# Patient Record
Sex: FEMALE | Race: OTHER | HISPANIC OR LATINO | Employment: FULL TIME | URBAN - METROPOLITAN AREA
[De-identification: names, ages, dates, MRNs, and addresses within clinical notes are randomized per-mention and may not be internally consistent; named-entity substitution may affect disease eponyms.]

---

## 2017-02-22 ENCOUNTER — OFFICE VISIT (OUTPATIENT)
Dept: URGENT CARE | Facility: PHYSICIAN GROUP | Age: 32
End: 2017-02-22
Payer: COMMERCIAL

## 2017-02-22 VITALS
DIASTOLIC BLOOD PRESSURE: 68 MMHG | HEART RATE: 110 BPM | BODY MASS INDEX: 34.55 KG/M2 | OXYGEN SATURATION: 96 % | SYSTOLIC BLOOD PRESSURE: 110 MMHG | TEMPERATURE: 99.1 F | HEIGHT: 66 IN | WEIGHT: 215 LBS

## 2017-02-22 DIAGNOSIS — J06.9 ACUTE URI: ICD-10-CM

## 2017-02-22 PROCEDURE — 99204 OFFICE O/P NEW MOD 45 MIN: CPT | Performed by: NURSE PRACTITIONER

## 2017-02-22 RX ORDER — AMOXICILLIN AND CLAVULANATE POTASSIUM 875; 125 MG/1; MG/1
1 TABLET, FILM COATED ORAL 2 TIMES DAILY
Qty: 20 TAB | Refills: 0 | Status: ON HOLD | OUTPATIENT
Start: 2017-02-22 | End: 2019-07-07

## 2017-02-22 RX ORDER — CODEINE PHOSPHATE AND GUAIFENESIN 10; 100 MG/5ML; MG/5ML
5-10 SOLUTION ORAL EVERY 6 HOURS PRN
Qty: 90 ML | Refills: 0 | Status: ON HOLD | OUTPATIENT
Start: 2017-02-22 | End: 2019-07-07

## 2017-02-22 ASSESSMENT — ENCOUNTER SYMPTOMS
SHORTNESS OF BREATH: 0
NAUSEA: 0
DIARRHEA: 0
FEVER: 0
SINUS PRESSURE: 1
CHILLS: 0
SORE THROAT: 1
SPUTUM PRODUCTION: 1
WEAKNESS: 1
VOMITING: 0
COUGH: 1
MYALGIAS: 0

## 2017-02-23 NOTE — PATIENT INSTRUCTIONS

## 2017-02-23 NOTE — PROGRESS NOTES
"Subjective:      Audrey Leyva is a 31 y.o. female who presents with Nasal Congestion            HPI Comments: Medications, Allergies and Prior Medical Hx reviewed and updated in [facility].with patient/family today         Sinusitis  This is a new problem. The current episode started in the past 7 days (3 days). The problem has been gradually worsening since onset. There has been no fever. Her pain is at a severity of 7/10. Associated symptoms include congestion, coughing, ear pain, sinus pressure and a sore throat. Pertinent negatives include no chills or shortness of breath. Past treatments include oral decongestants. The treatment provided mild relief.       Review of Systems   Constitutional: Positive for malaise/fatigue. Negative for fever and chills.   HENT: Positive for congestion, ear pain, sinus pressure and sore throat. Negative for ear discharge.    Respiratory: Positive for cough and sputum production. Negative for shortness of breath.    Gastrointestinal: Negative for nausea, vomiting and diarrhea.   Musculoskeletal: Negative for myalgias.   Neurological: Positive for weakness.          Objective:     /68 mmHg  Pulse 110  Temp(Src) 37.3 °C (99.1 °F)  Ht 1.676 m (5' 6\")  Wt 97.523 kg (215 lb)  BMI 34.72 kg/m2  SpO2 96%     Physical Exam   Constitutional: She appears well-developed and well-nourished. No distress.   HENT:   Head: Normocephalic and atraumatic.   Right Ear: Tympanic membrane and ear canal normal.   Left Ear: Tympanic membrane and ear canal normal.   Nose: Rhinorrhea present. Right sinus exhibits no maxillary sinus tenderness and no frontal sinus tenderness. Left sinus exhibits maxillary sinus tenderness. Left sinus exhibits no frontal sinus tenderness.   Mouth/Throat: Uvula is midline and mucous membranes are normal. Posterior oropharyngeal edema and posterior oropharyngeal erythema present. No oropharyngeal exudate.   Eyes: Conjunctivae are normal. Pupils are equal, round, and " reactive to light.   Neck: Neck supple.   Cardiovascular: Normal rate, regular rhythm and normal heart sounds.    Pulmonary/Chest: Effort normal and breath sounds normal. No respiratory distress. She has no wheezes.   Lymphadenopathy:     She has cervical adenopathy.   Neurological: She is alert.   Skin: Skin is warm and dry.   Psychiatric: She has a normal mood and affect. Her behavior is normal.   Vitals reviewed.              Assessment/Plan:     1. Acute URI  amoxicillin-clavulanate (AUGMENTIN) 875-125 MG Tab    guaifenesin-codeine (CHERATUSSIN AC) Solution oral solution         rx written patient will hold until parameters met as dicussed with patient    Do not drink alcohol or operate machinery with this medication  Pt reviewed on [place]  Aware,  no remarkable controlled substance prescription documentation noted      Modified Epic generated written imformation provided along with verbal instructions    Rest, Fluids, tylenol, ibuprofen, sinus irrigation,  humidified air, and otc decongestants  Pt will go to the ER for worsening or changing symptoms as discussed,   Follow-up with your primary care provider or return here if not improving in 5 - 7 days   Discharge instructions discussed with pt/family who verbalize understanding and agreement with poc

## 2018-03-29 ENCOUNTER — OFFICE VISIT (OUTPATIENT)
Dept: URGENT CARE | Facility: CLINIC | Age: 33
End: 2018-03-29
Payer: COMMERCIAL

## 2018-03-29 VITALS
RESPIRATION RATE: 18 BRPM | WEIGHT: 228.2 LBS | OXYGEN SATURATION: 97 % | SYSTOLIC BLOOD PRESSURE: 114 MMHG | DIASTOLIC BLOOD PRESSURE: 70 MMHG | BODY MASS INDEX: 36.67 KG/M2 | HEART RATE: 84 BPM | TEMPERATURE: 98.3 F | HEIGHT: 66 IN

## 2018-03-29 DIAGNOSIS — J02.9 SORE THROAT: ICD-10-CM

## 2018-03-29 DIAGNOSIS — J03.90 TONSILLITIS: ICD-10-CM

## 2018-03-29 PROCEDURE — 99214 OFFICE O/P EST MOD 30 MIN: CPT | Performed by: NURSE PRACTITIONER

## 2018-03-29 RX ORDER — DICLOFENAC POTASSIUM 50 MG/1
TABLET, FILM COATED ORAL
Status: ON HOLD | COMMUNITY
Start: 2018-03-08 | End: 2019-07-07

## 2018-03-29 RX ORDER — AMOXICILLIN 875 MG/1
875 TABLET, COATED ORAL 2 TIMES DAILY
Qty: 20 TAB | Refills: 0 | Status: SHIPPED | OUTPATIENT
Start: 2018-03-29 | End: 2018-04-08

## 2018-03-29 RX ORDER — ERGOCALCIFEROL 1.25 MG/1
CAPSULE ORAL
Status: ON HOLD | COMMUNITY
Start: 2018-03-13 | End: 2019-07-07

## 2018-03-29 ASSESSMENT — ENCOUNTER SYMPTOMS
SWOLLEN GLANDS: 1
TROUBLE SWALLOWING: 1
EYES NEGATIVE: 1
FEVER: 0
CARDIOVASCULAR NEGATIVE: 1
SORE THROAT: 1
CHILLS: 0
MUSCULOSKELETAL NEGATIVE: 1
COUGH: 1

## 2018-03-29 NOTE — PROGRESS NOTES
"Subjective:      Audrey Leyva is a 33 y.o. female who presents with Pharyngitis (ear ache, swollen tonsils, x 9 days )    History reviewed. No pertinent past medical history.  Social History     Social History   • Marital status: Single     Spouse name: N/A   • Number of children: N/A   • Years of education: N/A     Occupational History   • Not on file.     Social History Main Topics   • Smoking status: Current Some Day Smoker   • Smokeless tobacco: Never Used   • Alcohol use Not on file   • Drug use: Unknown   • Sexual activity: Not on file     Other Topics Concern   • Not on file     Social History Narrative   • No narrative on file     History reviewed. No pertinent family history.    Allergies :Patient has no known allergies.    This patient is a 33-year-old female who presents today with complaint of sore throat over the last 7 days. States she also has pain in her left ear. Left side of the throat is more sore than the right. She denies any fever, aches, or chills. No other URI symptoms              Pharyngitis    This is a new problem. The current episode started in the past 7 days. The problem has been gradually worsening. Neither side of throat is experiencing more pain than the other. There has been no fever. Associated symptoms include congestion, coughing, ear pain, swollen glands and trouble swallowing. She has tried nothing for the symptoms. The treatment provided no relief.       Review of Systems   Constitutional: Positive for malaise/fatigue. Negative for chills and fever.   HENT: Positive for congestion, ear pain, sore throat and trouble swallowing.    Eyes: Negative.    Respiratory: Positive for cough.    Cardiovascular: Negative.    Musculoskeletal: Negative.    Skin: Negative.    All other systems reviewed and are negative.         Objective:     /70   Pulse 84   Temp 36.8 °C (98.3 °F)   Resp 18   Ht 1.676 m (5' 6\")   Wt 103.5 kg (228 lb 3.2 oz)   SpO2 97%   Breastfeeding? No   " BMI 36.83 kg/m²      Physical Exam   Constitutional: She is oriented to person, place, and time. She appears well-developed and well-nourished.   HENT:   Head: Normocephalic.   Right Ear: External ear normal.   Left Ear: External ear normal.   Nose: Nose normal.   Mouth/Throat: Oropharyngeal exudate present.   Oropharynx red, L>R, with exudate.    Eyes: Conjunctivae and EOM are normal. Pupils are equal, round, and reactive to light.   Neck: Normal range of motion. Neck supple.   Cardiovascular: Normal rate and regular rhythm.    Pulmonary/Chest: Effort normal and breath sounds normal.   Musculoskeletal: Normal range of motion.   Lymphadenopathy:     She has cervical adenopathy.   Neurological: She is alert and oriented to person, place, and time.   Skin: Skin is warm and dry. Capillary refill takes less than 2 seconds.   Psychiatric: She has a normal mood and affect. Her behavior is normal. Judgment and thought content normal.   Vitals reviewed.              Assessment/Plan:     1. Sore throat  2. Exudative tonsillitis  3. Pharyngitis  -amoxil  -warm saltwater gargles  -tylenol/motrin PRN  -ibuprofen as needed  -follow up if symptoms persist or worsen

## 2018-08-08 ENCOUNTER — OFFICE VISIT (OUTPATIENT)
Dept: URGENT CARE | Facility: CLINIC | Age: 33
End: 2018-08-08
Payer: COMMERCIAL

## 2018-08-08 VITALS
WEIGHT: 228 LBS | SYSTOLIC BLOOD PRESSURE: 118 MMHG | BODY MASS INDEX: 36.64 KG/M2 | RESPIRATION RATE: 20 BRPM | HEIGHT: 66 IN | TEMPERATURE: 98.9 F | OXYGEN SATURATION: 97 % | HEART RATE: 108 BPM | DIASTOLIC BLOOD PRESSURE: 78 MMHG

## 2018-08-08 DIAGNOSIS — J03.90 EXUDATIVE TONSILLITIS: ICD-10-CM

## 2018-08-08 DIAGNOSIS — E66.9 OBESITY (BMI 35.0-39.9 WITHOUT COMORBIDITY): ICD-10-CM

## 2018-08-08 LAB
HETEROPH AB SER QL LA: NEGATIVE
INT CON NEG: NEGATIVE
INT CON NEG: NEGATIVE
INT CON POS: POSITIVE
INT CON POS: POSITIVE
S PYO AG THROAT QL: NEGATIVE

## 2018-08-08 PROCEDURE — 86308 HETEROPHILE ANTIBODY SCREEN: CPT | Performed by: PHYSICIAN ASSISTANT

## 2018-08-08 PROCEDURE — 87880 STREP A ASSAY W/OPTIC: CPT | Performed by: PHYSICIAN ASSISTANT

## 2018-08-08 PROCEDURE — 99214 OFFICE O/P EST MOD 30 MIN: CPT | Performed by: PHYSICIAN ASSISTANT

## 2018-08-08 RX ORDER — AMOXICILLIN 500 MG/1
500 CAPSULE ORAL 2 TIMES DAILY
Qty: 20 CAP | Refills: 0 | Status: SHIPPED | OUTPATIENT
Start: 2018-08-08 | End: 2018-08-18

## 2018-08-08 ASSESSMENT — ENCOUNTER SYMPTOMS
EYE DISCHARGE: 0
COUGH: 0
EYE PAIN: 0
EYE REDNESS: 0
HEADACHES: 0
SORE THROAT: 1
STRIDOR: 0
FEVER: 0
CHILLS: 0
TROUBLE SWALLOWING: 1
HEMOPTYSIS: 0
SHORTNESS OF BREATH: 0
SWOLLEN GLANDS: 1
WHEEZING: 0
SPUTUM PRODUCTION: 0
PALPITATIONS: 0

## 2018-08-09 NOTE — PROGRESS NOTES
Subjective:      Audrey Leyva is a 33 y.o. female who presents with Pharyngitis (swollen, white,painful to swallow x4days)            Pharyngitis    This is a new problem. The current episode started in the past 7 days. The problem has been unchanged. Associated symptoms include swollen glands and trouble swallowing. Pertinent negatives include no congestion, coughing, ear discharge, ear pain, headaches, shortness of breath or stridor. She has tried nothing for the symptoms.       Review of Systems   Constitutional: Negative for chills, fever and malaise/fatigue.   HENT: Positive for sore throat and trouble swallowing. Negative for congestion, ear discharge and ear pain.    Eyes: Negative for pain, discharge and redness.   Respiratory: Negative for cough, hemoptysis, sputum production, shortness of breath, wheezing and stridor.    Cardiovascular: Negative for chest pain and palpitations.   Skin: Negative for itching and rash.   Neurological: Negative for headaches.   All other systems reviewed and are negative.    PMH:  has no past medical history on file.  MEDS:   Current Outpatient Prescriptions:   •  amoxicillin (AMOXIL) 500 MG Cap, Take 1 Cap by mouth 2 times a day for 10 days., Disp: 20 Cap, Rfl: 0  •  diclofenac (CATAFLAM) 50 MG tablet, , Disp: , Rfl:   •  vitamin D, Ergocalciferol, (DRISDOL) 54662 units Cap capsule, , Disp: , Rfl:   •  norgestrel-ethinyl estradiol (LO-OVRAL) 0.3-30 MG-MCG Tab, Take 1 Tab by mouth every day., Disp: , Rfl:   •  amoxicillin-clavulanate (AUGMENTIN) 875-125 MG Tab, Take 1 Tab by mouth 2 times a day. (Patient not taking: Reported on 8/8/2018), Disp: 20 Tab, Rfl: 0  •  guaifenesin-codeine (CHERATUSSIN AC) Solution oral solution, Take 5-10 mL by mouth every 6 hours as needed for Cough. (Patient not taking: Reported on 8/8/2018), Disp: 90 mL, Rfl: 0  ALLERGIES: No Known Allergies  SURGHX:   Past Surgical History:   Procedure Laterality Date   • CHOLECYSTECTOMY       SOCHX:   "reports that she has been smoking.  She has never used smokeless tobacco. She reports that she drinks alcohol. She reports that she does not use drugs.  FH: Family history was reviewed, no pertinent findings to report  Medications, Allergies, and current problem list reviewed today in Epic       Objective:     /78   Pulse (!) 108   Temp 37.2 °C (98.9 °F)   Resp 20   Ht 1.676 m (5' 6\")   Wt 103.4 kg (228 lb)   SpO2 97%   BMI 36.80 kg/m²      Physical Exam   Constitutional: She is oriented to person, place, and time. She appears well-developed and well-nourished.  Non-toxic appearance. She does not have a sickly appearance. She does not appear ill. No distress.   HENT:   Head: Normocephalic and atraumatic.   Right Ear: Hearing, tympanic membrane, external ear and ear canal normal.   Left Ear: Hearing, tympanic membrane, external ear and ear canal normal.   Nose: Nose normal.   Mouth/Throat: Uvula is midline and mucous membranes are normal. Oropharyngeal exudate and posterior oropharyngeal erythema present. No posterior oropharyngeal edema or tonsillar abscesses.   Neck: Normal range of motion. Neck supple. No JVD present. No tracheal deviation present. No thyromegaly present.   Cardiovascular: Regular rhythm and normal heart sounds.  Exam reveals no gallop and no friction rub.    No murmur heard.  Pulmonary/Chest: Effort normal and breath sounds normal. No stridor. No respiratory distress. She has no wheezes. She has no rales. She exhibits no tenderness.   Lymphadenopathy:     She has cervical adenopathy.   Neurological: She is alert and oriented to person, place, and time.   Skin: Skin is warm and dry.   Psychiatric: She has a normal mood and affect. Her behavior is normal. Judgment and thought content normal.   Vitals reviewed.           Rapid Strep: NEG  Mono Spot: NEG  Centor Criteria 4/5  Assessment/Plan:   Highly suspect other strep species.    1. Exudative tonsillitis    - amoxicillin (AMOXIL) 500 " MG Cap; Take 1 Cap by mouth 2 times a day for 10 days.  Dispense: 20 Cap; Refill: 0  - POCT Mononucleosis (mono)    Differential diagnosis, natural history, supportive care discussed. Follow-up with primary care provider within 7-10 days, emergency room precautions discussed.  Patient and/or family appears understanding of information.  Handout and review of patients diagnosis and treatment was discussed extensively.

## 2018-11-02 ENCOUNTER — HOSPITAL ENCOUNTER (OUTPATIENT)
Facility: MEDICAL CENTER | Age: 33
End: 2018-11-02
Attending: EMERGENCY MEDICINE
Payer: COMMERCIAL

## 2018-11-02 ENCOUNTER — OFFICE VISIT (OUTPATIENT)
Dept: URGENT CARE | Facility: CLINIC | Age: 33
End: 2018-11-02
Payer: COMMERCIAL

## 2018-11-02 VITALS
HEART RATE: 84 BPM | SYSTOLIC BLOOD PRESSURE: 128 MMHG | BODY MASS INDEX: 36.16 KG/M2 | DIASTOLIC BLOOD PRESSURE: 80 MMHG | OXYGEN SATURATION: 97 % | HEIGHT: 66 IN | TEMPERATURE: 98.3 F | WEIGHT: 225 LBS | RESPIRATION RATE: 16 BRPM

## 2018-11-02 DIAGNOSIS — N89.8 VAGINAL IRRITATION: ICD-10-CM

## 2018-11-02 DIAGNOSIS — R39.9 SYMPTOMS INVOLVING URINARY SYSTEM: ICD-10-CM

## 2018-11-02 DIAGNOSIS — Z11.3 SCREENING FOR STD (SEXUALLY TRANSMITTED DISEASE): ICD-10-CM

## 2018-11-02 LAB
APPEARANCE UR: CLEAR
BILIRUB UR STRIP-MCNC: NORMAL MG/DL
COLOR UR AUTO: YELLOW
GLUCOSE UR STRIP.AUTO-MCNC: NORMAL MG/DL
INT CON NEG: NEGATIVE
INT CON POS: POSITIVE
KETONES UR STRIP.AUTO-MCNC: NORMAL MG/DL
LEUKOCYTE ESTERASE UR QL STRIP.AUTO: NORMAL
NITRITE UR QL STRIP.AUTO: NORMAL
PH UR STRIP.AUTO: 7 [PH] (ref 5–8)
POC URINE PREGNANCY TEST: NORMAL
PROT UR QL STRIP: NORMAL MG/DL
RBC UR QL AUTO: NORMAL
SP GR UR STRIP.AUTO: 1.02
UROBILINOGEN UR STRIP-MCNC: NORMAL MG/DL

## 2018-11-02 PROCEDURE — 87491 CHLMYD TRACH DNA AMP PROBE: CPT

## 2018-11-02 PROCEDURE — 81002 URINALYSIS NONAUTO W/O SCOPE: CPT | Performed by: EMERGENCY MEDICINE

## 2018-11-02 PROCEDURE — 87480 CANDIDA DNA DIR PROBE: CPT

## 2018-11-02 PROCEDURE — 81025 URINE PREGNANCY TEST: CPT | Performed by: EMERGENCY MEDICINE

## 2018-11-02 PROCEDURE — 87591 N.GONORRHOEAE DNA AMP PROB: CPT

## 2018-11-02 PROCEDURE — 87660 TRICHOMONAS VAGIN DIR PROBE: CPT

## 2018-11-02 PROCEDURE — 99213 OFFICE O/P EST LOW 20 MIN: CPT | Performed by: EMERGENCY MEDICINE

## 2018-11-02 PROCEDURE — 87510 GARDNER VAG DNA DIR PROBE: CPT

## 2018-11-02 RX ORDER — METRONIDAZOLE 500 MG/1
2000 TABLET ORAL ONCE
Qty: 4 TAB | Refills: 0 | Status: SHIPPED | OUTPATIENT
Start: 2018-11-02 | End: 2018-11-02

## 2018-11-02 ASSESSMENT — ENCOUNTER SYMPTOMS
NAUSEA: 0
FEVER: 0
VOMITING: 0
ABDOMINAL PAIN: 0
FLANK PAIN: 0
ANOREXIA: 0
CHILLS: 0
VAGINITIS: 1
DIARRHEA: 0

## 2018-11-03 ENCOUNTER — TELEPHONE (OUTPATIENT)
Dept: URGENT CARE | Facility: CLINIC | Age: 33
End: 2018-11-03

## 2018-11-03 DIAGNOSIS — Z11.3 SCREENING FOR STD (SEXUALLY TRANSMITTED DISEASE): ICD-10-CM

## 2018-11-03 LAB
CANDIDA DNA VAG QL PROBE+SIG AMP: NEGATIVE
G VAGINALIS DNA VAG QL PROBE+SIG AMP: POSITIVE
T VAGINALIS DNA VAG QL PROBE+SIG AMP: NEGATIVE

## 2018-11-03 NOTE — PROGRESS NOTES
"Subjective:      Audrey Leyva is a 33 y.o. female who presents with UTI (x 3 days or yeast infection itchy )            Vaginitis   The patient's primary symptoms include genital itching. This is a new problem. The current episode started in the past 7 days. The problem occurs daily. The problem has been unchanged. The patient is experiencing no pain. Pregnant now: uncertain. Associated symptoms include dysuria. Pertinent negatives include no abdominal pain, anorexia, chills, diarrhea, discolored urine, fever, flank pain, frequency, hematuria, nausea, rash, urgency or vomiting. She is sexually active. It is unknown whether or not her partner has an STD. She uses nothing for contraception. Her menstrual history has been regular.       Review of Systems   Constitutional: Negative for chills and fever.   Gastrointestinal: Negative for abdominal pain, anorexia, diarrhea, nausea and vomiting.   Genitourinary: Positive for dysuria. Negative for flank pain, frequency, hematuria and urgency.        No vaginal discharge or bleeding. LMP 3 weeks ago.  Uncertain pregnancy. Uncertaine sexually transmitted disease exposure or risk.   Skin: Negative for rash.          Objective:     /80   Pulse 84   Temp 36.8 °C (98.3 °F)   Resp 16   Ht 1.676 m (5' 6\")   Wt 102.1 kg (225 lb)   SpO2 97%   Breastfeeding? No   BMI 36.32 kg/m²      Physical Exam   Constitutional: She appears well-developed and well-nourished. She is cooperative. She does not have a sickly appearance. She does not appear ill. No distress.   Cardiovascular: Normal rate, regular rhythm and normal heart sounds.    Pulmonary/Chest: Effort normal and breath sounds normal.   Abdominal: Soft. She exhibits no distension. There is no tenderness. There is no CVA tenderness.   Neurological: She is alert.   Skin: Skin is warm and dry.   Psychiatric: She has a normal mood and affect.               Assessment/Plan:     1. Vaginal irritation  - VAGINAL PATHOGENS " DNA PANEL; Future  (Rx Flagyl if BV).    2. Symptoms involving urinary system  negative- POCT Urinalysis  negative- POCT Pregnancy    3. Screening for STD (sexually transmitted disease)  - CHLAMYDIA/GC PCR URINE OR SWAB; Future

## 2018-11-03 NOTE — TELEPHONE ENCOUNTER
Please notify patient of positive test for bacterial vaginosis, as anticipated.  It is a condition of overgrowth of bacteria causing symptoms.  It is not a sexually transmitted illness, and there are not proven prevention measures.  Treatment is an antibiotic; prescription given should be used.  Advise patient to abstain from alcohol use during treatment or severe nausea/vomiting may occur.  Follow up with PCP as advised.

## 2018-11-04 LAB
C TRACH DNA SPEC QL NAA+PROBE: NEGATIVE
N GONORRHOEA DNA SPEC QL NAA+PROBE: NEGATIVE
SPECIMEN SOURCE: NORMAL

## 2019-01-11 ENCOUNTER — NON-PROVIDER VISIT (OUTPATIENT)
Dept: HEALTH INFORMATION MANAGEMENT | Facility: MEDICAL CENTER | Age: 34
End: 2019-01-11
Payer: COMMERCIAL

## 2019-01-11 VITALS — BODY MASS INDEX: 36.64 KG/M2 | WEIGHT: 228 LBS | HEIGHT: 66 IN

## 2019-01-11 DIAGNOSIS — O24.419 GESTATIONAL DIABETES MELLITUS (GDM) IN SECOND TRIMESTER, GESTATIONAL DIABETES METHOD OF CONTROL UNSPECIFIED: ICD-10-CM

## 2019-01-11 PROCEDURE — G0109 DIAB MANAGE TRN IND/GROUP: HCPCS | Performed by: INTERNAL MEDICINE

## 2019-01-11 NOTE — LETTER
January 11, 2019                   Re: Audrey Leyva     1985         [id number]       Reanna Cook M.D.  [address] [place] [address]      Dear :Reanna Cook M.D.    On 1/11/2019, your patient Audrey Leyva, received 1 hour of nurse   training from the [facility] at [facility] for management of her gestational diabetes.  Her Estimated Date of Delivery: 7/20/19.  We taught the following subjects:    Introduction to gestational diabetes, benefits and responsibilities of patient, physiology of diabetes and the diease process, benefits of blood glucose monitoring and record keeping, medication action and possible side effects, hypoglycemia, sick day management, exercise, stress reduction and travel with diabetes.       Nurse assessment / Education:    Comments:    Edema:no      Weight:Weight: 103.4 kg (228 lb)         Complaints:no      Pathophysiology of diabetes in pregnancy    Discuss  potential maternal and fetal complications in pregnancy with diabetes.     Importance of blood glucose monitoring   Proper testing technique using a One Touch Verio Flex meter.    At 2:05 pm, the meter read 96, which was 1.5 hours after eating.  Testing: fasting and one hour after meals,  expected ranges and rationale for strict control.     Ketone test today:no       Recognition and treatment of hypoglycemia.     Insulin taught: No  Insulin briefly dicussed at this time.    Should patient require insulin later in pregnancy, she would need further education.     Reviewed fetal kick counts and other tests to determine fetal well-being  Discuss benefits and risks of exercise in pregnancy  Discuss when to call Doctor  Discuss sick day care        Patient/caregiver appeared to understand the content as demonstrated by appropriate questions.     Audrey Leyva was encouraged to discuss this further with you.    Hopefully this will help in your management of her care.  If we can be of further  assistance, please feel free to call.    Thank you for the referral.    Sincerely,      Ashanti Conway RN CDE  GDM CLASS  Certified Diabetes Educator

## 2019-01-11 NOTE — LETTER
January 11, 2019                   Re: Audrey Leyva     1985             Reanna Cook M.D.  [address] [place] [address]      Dear :Reanna Cook M.D.    On 1/11/2019, your patient Audrey Leyva, received 2 hours of nutrition training from the [facility] at [facility] for management of her gestational diabetes.  Her EDC is Estimated Date of Delivery: None noted..  We taught the following subjects:    Patient was provided with a 2000 calorie meal plan with 3 meals and 3 snacks.  Meal plan contains 195 grams of carbohydrates per day.   Importance of meal planning in diabetes management during pregnancy  Importance of consistent timing of meals and snacks and agreed upon times  Avoidance of simple carbohydrates  Metabolism of food components relating to pregnancy  Identification of foods in food groups  Patient demonstrates adequate ability to utilize meal planning manual for reference  Plan 3 meals and 3 snacks with 90% accuracy  Review basic principles of eating out  Reviewed precautions with artificial sweeteners    Comments:  Audrey agreed to follow the meal plan and to eat at the times agreed upon.  She will check blood glucose 4 times a day and record the values in her log book.         Hopefully this will help in your management of her care.  If we can be of further assistance, please feel free to call.    Thank you for the referral.    Sincerely,   Grace Hernandez, AURELIANO, LD, CDE  [phone]

## 2019-01-11 NOTE — PROGRESS NOTES
Audrey came to the Gestational Diabetes program.  She states her mother and grandmother both have Type 2 Diabetes.  She denies any problems with this pregnancy.  She was supplied an One Touch Verio Flex meter.  She was able to do a return demonstration without difficulty. She will keep walking and stay well hydrated with water. Her meal plan is scanned into Media and her Doctor Letters with what was taught can be found under the Letters tab.

## 2019-02-18 ENCOUNTER — OFFICE VISIT (OUTPATIENT)
Dept: URGENT CARE | Facility: CLINIC | Age: 34
End: 2019-02-18
Payer: COMMERCIAL

## 2019-02-18 VITALS
TEMPERATURE: 98.7 F | DIASTOLIC BLOOD PRESSURE: 70 MMHG | RESPIRATION RATE: 20 BRPM | SYSTOLIC BLOOD PRESSURE: 110 MMHG | BODY MASS INDEX: 35.84 KG/M2 | WEIGHT: 223 LBS | HEIGHT: 66 IN | OXYGEN SATURATION: 96 % | HEART RATE: 88 BPM

## 2019-02-18 DIAGNOSIS — J02.9 SORE THROAT: ICD-10-CM

## 2019-02-18 DIAGNOSIS — J02.9 VIRAL PHARYNGITIS: ICD-10-CM

## 2019-02-18 LAB
INT CON NEG: NORMAL
INT CON POS: NORMAL
S PYO AG THROAT QL: NORMAL

## 2019-02-18 PROCEDURE — 99213 OFFICE O/P EST LOW 20 MIN: CPT | Performed by: NURSE PRACTITIONER

## 2019-02-18 PROCEDURE — 87880 STREP A ASSAY W/OPTIC: CPT | Performed by: NURSE PRACTITIONER

## 2019-02-18 NOTE — PROGRESS NOTES
"Subjective:      Audrey Leyva is a 33 y.o. female who presents with Pharyngitis (Couple days sorethroat , 18 wks pregnant)    Denies past medical, surgical or family history that is significant to today's problem.   RX or OTC medications-- reviewed with patient today.   No Known Allergies          HPI this is a new problem.  Jessy is a 33-year-old female presents with sore throat for 2-3 days.  She is 18 weeks pregnant and concerned about possible strep infection.  She says she is prone to strep infections and has enlarged tonsils.  She has tried salt water gargles without relief of her symptoms.  She has not certain what over-the-counter medication she can take.  She denies fevers, chills, difficulty swallowing, shortness of breath, cough.  No other aggravating and alleviating factors.    ROS  See HPI     Objective:     /70 (BP Location: Right arm, Patient Position: Sitting, BP Cuff Size: Large adult)   Pulse 88   Temp 37.1 °C (98.7 °F) (Temporal)   Resp 20   Ht 1.676 m (5' 6\")   Wt 101.2 kg (223 lb)   SpO2 96%   BMI 35.99 kg/m²      Physical Exam   Constitutional: She is oriented to person, place, and time. Vital signs are normal. She appears well-developed and well-nourished.  Non-toxic appearance. No distress.   HENT:   Head: Normocephalic.   Right Ear: Hearing, tympanic membrane, external ear and ear canal normal.   Left Ear: Hearing, tympanic membrane, external ear and ear canal normal.   Nose: Nose normal. Right sinus exhibits no frontal sinus tenderness. Left sinus exhibits no frontal sinus tenderness.   Mouth/Throat: Uvula is midline. Posterior oropharyngeal erythema present. No oropharyngeal exudate, posterior oropharyngeal edema or tonsillar abscesses.   Eyes: Pupils are equal, round, and reactive to light. Lids are normal.   Neck: Trachea normal, normal range of motion, full passive range of motion without pain and phonation normal. Neck supple.   Cardiovascular: Normal rate, regular " rhythm and normal pulses.    Pulmonary/Chest: Effort normal and breath sounds normal. No respiratory distress.   Abdominal: Soft.   Musculoskeletal: Normal range of motion.   Lymphadenopathy:        Head (right side): No submental, no submandibular and no tonsillar adenopathy present.        Head (left side): No submental, no submandibular and no tonsillar adenopathy present.     She has no cervical adenopathy.        Right: No supraclavicular adenopathy present.        Left: No supraclavicular adenopathy present.   Neurological: She is alert and oriented to person, place, and time. She has normal reflexes.   Skin: Skin is warm and dry.   Psychiatric: She has a normal mood and affect. Her speech is normal and behavior is normal.   Nursing note and vitals reviewed.       Strep test negative.       Assessment/Plan:     1. Sore throat  POCT Rapid Strep A   2. Viral pharyngitis       Salt water gargles BID and prn. Suggested 1/4 to 1/2 teaspoon (1.5 to 3.0 g) of salt per one cup (8 ounces or 250 mL) of warm water    Return to clinic or PCP   5-7  days if current symptoms are not resolving in a satisfactory manner or sooner if new or worsening symptoms occur.   Patient was advised of signs and symptoms which would warrant further evaluation and /or emergent evaluation in ER.  Verbalized agreement with this treatment plan and seemed to understand without barriers. Questions were encouraged and answered to patients satisfaction.   Aftercare instructions were given to pt/ caregiver.

## 2019-03-13 NOTE — PROGRESS NOTES
Audrey received a 2000 calorie meal plan (based on 20 kcal/kg of current weight) consisting of 3 meals and 3 snacks (see media for copy of meal plan).   Pt's prepregnancy weight was: 225lb. She has gained +3lb so far in this pregnancy.   Recommended meal times:   B: 8am   S: 10:30am   L: 12-2pm   S: 4pm   D: 7pm   S: 10pm    Pt was educated on carbohydrate containing foods vs non carbohydrate containing foods, the importance of small frequent meals, limiting carbohydrate to 1 serving in the morning, no fruit before noon/12pm, and avoiding all concentrated sweets for the remainder of her pregnancy. Explained the importance of not going >4hrs btwn eating during the day and no longer than 10hours overnight. Patient agrees to follow the meal plan and guidelines provided.         [Well Developed] : well developed [Well Nourished] : well nourished [NAD] : in no acute distress [Alert and Active] : alert and active [No Palpable Thyroid] : no palpable thyroid [Normal Oropharynx] : the oropharynx was normal [CTAB] : lungs clear to auscultation bilaterally [Regular Rate and Rhythm] : regular rate and rhythm [Normal S1, S2] : normal S1 and S2 [Soft] : soft  [Normal Bowel Sounds] : normal bowel sounds [No HSM] : no hepatosplenomegaly appreciated [Interactive] : interactive [Appropriate Behavior] : appropriate behavior [icteric] : anicteric [Oral Ulcers] : no oral ulcers [Respiratory Distress] : no respiratory distress  [Wheeze] : no wheezing  [Murmur] : no murmur [Distended] : non distended [Tender] : non tender [Stool Palpable] : no stool palpable [Anxious] : was not anxious [FreeTextEntry1] :   well developed young man in no distress;  [FreeTextEntry2] : PER

## 2019-05-25 ENCOUNTER — APPOINTMENT (OUTPATIENT)
Dept: OTHER | Facility: IMAGING CENTER | Age: 34
End: 2019-05-25

## 2019-06-06 ENCOUNTER — APPOINTMENT (OUTPATIENT)
Dept: OTHER | Facility: IMAGING CENTER | Age: 34
End: 2019-06-06

## 2019-06-19 ENCOUNTER — APPOINTMENT (OUTPATIENT)
Dept: OTHER | Facility: IMAGING CENTER | Age: 34
End: 2019-06-19

## 2019-07-06 ENCOUNTER — APPOINTMENT (OUTPATIENT)
Dept: OTHER | Facility: IMAGING CENTER | Age: 34
End: 2019-07-06
Payer: COMMERCIAL

## 2019-07-07 ENCOUNTER — APPOINTMENT (OUTPATIENT)
Dept: OBGYN | Facility: MEDICAL CENTER | Age: 34
End: 2019-07-07
Attending: OBSTETRICS & GYNECOLOGY
Payer: COMMERCIAL

## 2019-07-07 ENCOUNTER — HOSPITAL ENCOUNTER (INPATIENT)
Facility: MEDICAL CENTER | Age: 34
LOS: 4 days | End: 2019-07-11
Attending: OBSTETRICS & GYNECOLOGY | Admitting: OBSTETRICS & GYNECOLOGY
Payer: COMMERCIAL

## 2019-07-07 LAB
BASOPHILS # BLD AUTO: 0.3 % (ref 0–1.8)
BASOPHILS # BLD: 0.03 K/UL (ref 0–0.12)
EOSINOPHIL # BLD AUTO: 0.04 K/UL (ref 0–0.51)
EOSINOPHIL NFR BLD: 0.4 % (ref 0–6.9)
ERYTHROCYTE [DISTWIDTH] IN BLOOD BY AUTOMATED COUNT: 40.6 FL (ref 35.9–50)
GLUCOSE BLD-MCNC: 107 MG/DL (ref 65–99)
HCT VFR BLD AUTO: 40.6 % (ref 37–47)
HGB BLD-MCNC: 13.3 G/DL (ref 12–16)
HOLDING TUBE BB 8507: NORMAL
IMM GRANULOCYTES # BLD AUTO: 0.05 K/UL (ref 0–0.11)
IMM GRANULOCYTES NFR BLD AUTO: 0.5 % (ref 0–0.9)
LYMPHOCYTES # BLD AUTO: 2.45 K/UL (ref 1–4.8)
LYMPHOCYTES NFR BLD: 22.3 % (ref 22–41)
MCH RBC QN AUTO: 27.2 PG (ref 27–33)
MCHC RBC AUTO-ENTMCNC: 32.8 G/DL (ref 33.6–35)
MCV RBC AUTO: 83 FL (ref 81.4–97.8)
MONOCYTES # BLD AUTO: 0.58 K/UL (ref 0–0.85)
MONOCYTES NFR BLD AUTO: 5.3 % (ref 0–13.4)
NEUTROPHILS # BLD AUTO: 7.82 K/UL (ref 2–7.15)
NEUTROPHILS NFR BLD: 71.2 % (ref 44–72)
NRBC # BLD AUTO: 0 K/UL
NRBC BLD-RTO: 0 /100 WBC
PLATELET # BLD AUTO: 155 K/UL (ref 164–446)
PMV BLD AUTO: 11.3 FL (ref 9–12.9)
RBC # BLD AUTO: 4.89 M/UL (ref 4.2–5.4)
WBC # BLD AUTO: 11 K/UL (ref 4.8–10.8)

## 2019-07-07 PROCEDURE — A9270 NON-COVERED ITEM OR SERVICE: HCPCS | Performed by: OBSTETRICS & GYNECOLOGY

## 2019-07-07 PROCEDURE — 700111 HCHG RX REV CODE 636 W/ 250 OVERRIDE (IP): Performed by: OBSTETRICS & GYNECOLOGY

## 2019-07-07 PROCEDURE — 36415 COLL VENOUS BLD VENIPUNCTURE: CPT

## 2019-07-07 PROCEDURE — 82962 GLUCOSE BLOOD TEST: CPT

## 2019-07-07 PROCEDURE — 700105 HCHG RX REV CODE 258: Performed by: OBSTETRICS & GYNECOLOGY

## 2019-07-07 PROCEDURE — 59200 INSERT CERVICAL DILATOR: CPT

## 2019-07-07 PROCEDURE — 700102 HCHG RX REV CODE 250 W/ 637 OVERRIDE(OP): Performed by: OBSTETRICS & GYNECOLOGY

## 2019-07-07 PROCEDURE — 770002 HCHG ROOM/CARE - OB PRIVATE (112)

## 2019-07-07 PROCEDURE — 85025 COMPLETE CBC W/AUTO DIFF WBC: CPT

## 2019-07-07 RX ORDER — METHYLERGONOVINE MALEATE 0.2 MG/ML
0.2 INJECTION INTRAVENOUS
Status: DISCONTINUED | OUTPATIENT
Start: 2019-07-07 | End: 2019-07-09 | Stop reason: HOSPADM

## 2019-07-07 RX ORDER — CARBOPROST TROMETHAMINE 250 UG/ML
250 INJECTION, SOLUTION INTRAMUSCULAR
Status: DISCONTINUED | OUTPATIENT
Start: 2019-07-07 | End: 2019-07-09 | Stop reason: HOSPADM

## 2019-07-07 RX ORDER — ALUMINA, MAGNESIA, AND SIMETHICONE 2400; 2400; 240 MG/30ML; MG/30ML; MG/30ML
30 SUSPENSION ORAL EVERY 6 HOURS PRN
Status: DISCONTINUED | OUTPATIENT
Start: 2019-07-07 | End: 2019-07-09 | Stop reason: HOSPADM

## 2019-07-07 RX ORDER — MISOPROSTOL 200 UG/1
800 TABLET ORAL
Status: DISCONTINUED | OUTPATIENT
Start: 2019-07-07 | End: 2019-07-09 | Stop reason: HOSPADM

## 2019-07-07 RX ORDER — ONDANSETRON 2 MG/ML
4 INJECTION INTRAMUSCULAR; INTRAVENOUS EVERY 6 HOURS PRN
Status: DISCONTINUED | OUTPATIENT
Start: 2019-07-07 | End: 2019-07-11 | Stop reason: HOSPADM

## 2019-07-07 RX ORDER — SODIUM CHLORIDE, SODIUM LACTATE, POTASSIUM CHLORIDE, CALCIUM CHLORIDE 600; 310; 30; 20 MG/100ML; MG/100ML; MG/100ML; MG/100ML
INJECTION, SOLUTION INTRAVENOUS CONTINUOUS
Status: DISCONTINUED | OUTPATIENT
Start: 2019-07-07 | End: 2019-07-08

## 2019-07-07 RX ORDER — METOCLOPRAMIDE HYDROCHLORIDE 5 MG/ML
10 INJECTION INTRAMUSCULAR; INTRAVENOUS EVERY 6 HOURS PRN
Status: DISCONTINUED | OUTPATIENT
Start: 2019-07-07 | End: 2019-07-11 | Stop reason: HOSPADM

## 2019-07-07 RX ORDER — ONDANSETRON 4 MG/1
4 TABLET, ORALLY DISINTEGRATING ORAL EVERY 6 HOURS PRN
Status: DISCONTINUED | OUTPATIENT
Start: 2019-07-07 | End: 2019-07-11 | Stop reason: HOSPADM

## 2019-07-07 RX ADMIN — MISOPROSTOL 25 MCG: 100 TABLET ORAL at 23:09

## 2019-07-07 RX ADMIN — SODIUM CHLORIDE 5 MILLION UNITS: 900 INJECTION INTRAVENOUS at 23:25

## 2019-07-07 RX ADMIN — SODIUM CHLORIDE, POTASSIUM CHLORIDE, SODIUM LACTATE AND CALCIUM CHLORIDE: 600; 310; 30; 20 INJECTION, SOLUTION INTRAVENOUS at 23:24

## 2019-07-07 ASSESSMENT — PATIENT HEALTH QUESTIONNAIRE - PHQ9
2. FEELING DOWN, DEPRESSED, IRRITABLE, OR HOPELESS: NOT AT ALL
SUM OF ALL RESPONSES TO PHQ9 QUESTIONS 1 AND 2: 0
1. LITTLE INTEREST OR PLEASURE IN DOING THINGS: NOT AT ALL

## 2019-07-07 ASSESSMENT — LIFESTYLE VARIABLES
EVER_SMOKED: YES
ALCOHOL_USE: NO

## 2019-07-08 LAB
APPEARANCE UR: CLEAR
COLOR UR AUTO: YELLOW
GLUCOSE BLD-MCNC: 68 MG/DL (ref 65–99)
GLUCOSE BLD-MCNC: 75 MG/DL (ref 65–99)
GLUCOSE BLD-MCNC: 80 MG/DL (ref 65–99)
GLUCOSE BLD-MCNC: 80 MG/DL (ref 65–99)
GLUCOSE BLD-MCNC: 82 MG/DL (ref 65–99)
GLUCOSE BLD-MCNC: 95 MG/DL (ref 65–99)
GLUCOSE BLD-MCNC: 97 MG/DL (ref 65–99)
GLUCOSE UR QL STRIP.AUTO: NEGATIVE MG/DL
KETONES UR QL STRIP.AUTO: 40 MG/DL
LEUKOCYTE ESTERASE UR QL STRIP.AUTO: ABNORMAL
NITRITE UR QL STRIP.AUTO: NEGATIVE
PH UR STRIP.AUTO: 6 [PH]
PROT UR QL STRIP: NEGATIVE MG/DL
RBC UR QL AUTO: ABNORMAL
SP GR UR: 1.01

## 2019-07-08 PROCEDURE — 700105 HCHG RX REV CODE 258

## 2019-07-08 PROCEDURE — 700102 HCHG RX REV CODE 250 W/ 637 OVERRIDE(OP): Performed by: OBSTETRICS & GYNECOLOGY

## 2019-07-08 PROCEDURE — 82962 GLUCOSE BLOOD TEST: CPT | Mod: 91

## 2019-07-08 PROCEDURE — 81002 URINALYSIS NONAUTO W/O SCOPE: CPT

## 2019-07-08 PROCEDURE — 700111 HCHG RX REV CODE 636 W/ 250 OVERRIDE (IP): Performed by: OBSTETRICS & GYNECOLOGY

## 2019-07-08 PROCEDURE — 700105 HCHG RX REV CODE 258: Performed by: OBSTETRICS & GYNECOLOGY

## 2019-07-08 PROCEDURE — 3E033VJ INTRODUCTION OF OTHER HORMONE INTO PERIPHERAL VEIN, PERCUTANEOUS APPROACH: ICD-10-PCS | Performed by: OBSTETRICS & GYNECOLOGY

## 2019-07-08 PROCEDURE — A9270 NON-COVERED ITEM OR SERVICE: HCPCS | Performed by: OBSTETRICS & GYNECOLOGY

## 2019-07-08 PROCEDURE — 770002 HCHG ROOM/CARE - OB PRIVATE (112)

## 2019-07-08 RX ORDER — SODIUM CHLORIDE, SODIUM LACTATE, POTASSIUM CHLORIDE, CALCIUM CHLORIDE 600; 310; 30; 20 MG/100ML; MG/100ML; MG/100ML; MG/100ML
INJECTION, SOLUTION INTRAVENOUS
Status: COMPLETED
Start: 2019-07-08 | End: 2019-07-08

## 2019-07-08 RX ORDER — DEXTROSE, SODIUM CHLORIDE, SODIUM LACTATE, POTASSIUM CHLORIDE, AND CALCIUM CHLORIDE 5; .6; .31; .03; .02 G/100ML; G/100ML; G/100ML; G/100ML; G/100ML
INJECTION, SOLUTION INTRAVENOUS CONTINUOUS
Status: DISCONTINUED | OUTPATIENT
Start: 2019-07-08 | End: 2019-07-10

## 2019-07-08 RX ADMIN — SODIUM CHLORIDE 2.5 MILLION UNITS: 9 INJECTION, SOLUTION INTRAVENOUS at 03:21

## 2019-07-08 RX ADMIN — MISOPROSTOL 25 MCG: 100 TABLET ORAL at 07:50

## 2019-07-08 RX ADMIN — SODIUM CHLORIDE 2.5 MILLION UNITS: 9 INJECTION, SOLUTION INTRAVENOUS at 11:20

## 2019-07-08 RX ADMIN — MISOPROSTOL 25 MCG: 100 TABLET ORAL at 03:18

## 2019-07-08 RX ADMIN — SODIUM CHLORIDE, SODIUM LACTATE, POTASSIUM CHLORIDE, CALCIUM CHLORIDE AND DEXTROSE MONOHYDRATE: 5; 600; 310; 30; 20 INJECTION, SOLUTION INTRAVENOUS at 19:24

## 2019-07-08 RX ADMIN — SODIUM CHLORIDE, POTASSIUM CHLORIDE, SODIUM LACTATE AND CALCIUM CHLORIDE 1000 ML: 600; 310; 30; 20 INJECTION, SOLUTION INTRAVENOUS at 18:57

## 2019-07-08 RX ADMIN — SODIUM CHLORIDE 2.5 MILLION UNITS: 9 INJECTION, SOLUTION INTRAVENOUS at 18:57

## 2019-07-08 RX ADMIN — Medication 1 MILLI-UNITS/MIN: at 12:34

## 2019-07-08 RX ADMIN — SODIUM CHLORIDE 2.5 MILLION UNITS: 9 INJECTION, SOLUTION INTRAVENOUS at 15:36

## 2019-07-08 RX ADMIN — SODIUM CHLORIDE 2.5 MILLION UNITS: 9 INJECTION, SOLUTION INTRAVENOUS at 22:51

## 2019-07-08 RX ADMIN — SODIUM CHLORIDE 2.5 MILLION UNITS: 9 INJECTION, SOLUTION INTRAVENOUS at 06:56

## 2019-07-08 ASSESSMENT — PATIENT HEALTH QUESTIONNAIRE - PHQ9
2. FEELING DOWN, DEPRESSED, IRRITABLE, OR HOPELESS: NOT AT ALL
1. LITTLE INTEREST OR PLEASURE IN DOING THINGS: NOT AT ALL
SUM OF ALL RESPONSES TO PHQ9 QUESTIONS 1 AND 2: 0

## 2019-07-08 NOTE — PROGRESS NOTES
"S: Feeling cramping    O: /88   Pulse 89   Temp 36.7 °C (98 °F)   Ht 1.676 m (5' 6\")   Wt 114.3 kg (252 lb)    Gen: NAD  SVE: 2/50/-3  AROM scant/clear. IUPC placed  [place]: CTX q2-6  FHT: Baseline 130 with moderate LTV. +accels. No variables/decels    A/P 33yo  @ 38w1d, IOL for GDMA2  1. Labor:  -Continue pitocin  2. Cat I  3. Pain  -Denies needs currently  4. GBS positive  5. GDM -Currently well controlled  "

## 2019-07-08 NOTE — PROGRESS NOTES
"S: Feeling some cramping    O: /79   Pulse 92   Temp 36.7 °C (98 °F)   Ht 1.676 m (5' 6\")   Wt 114.3 kg (252 lb)   Gen: NAD  SVE: 2cm per RN  [place]: CTX q3-5  FHT: 140 with moderate LTV. +accels, no variables/decel    A/P 33yo  @ 38w, IOL for GDMA2  1. Labor -Continue pitocin  2. Cat I  3. Denies current pain needs  4. GDMA2 -Currently well controlled  5. GBS positive on PCN   "

## 2019-07-08 NOTE — H&P
DATE OF SERVICE:  2019    HISTORY OF PRESENT ILLNESS:  This is a 34-year-old  1, para 0 at 38   weeks and 2 days, who presents for induction of labor secondary to gestational   hypertension.  She has been admitted to the hospital since last night and   received 2 doses of Cytotec and penicillin for GBS prophylaxis.  She is   feeling occasional contractions.  She denies loss of fluid, vaginal bleeding,   and reports good fetal movement.    Prenatal care has been with Dr. Cook since the first trimester.  MAUREEN is   2019 by LMP consistent with an 8-week ultrasound.  She has also had   consultation with Katelyn.  She is blood type A positive, AST negative, rubella   immune, hep B surface antigen negative, HIV negative, gonorrhea and chlamydia   negative, RPR nonreactive, and GBS positive.  First trimester screen normal.    AFP normal.  Ultrasound showed a normal fetal anatomical survey, though the   RVOT was unable to be cleared on ultrasound exam prior to delivery.    PAST MEDICAL HISTORY:  None.    PAST SURGICAL HISTORY:  Cholecystectomy.    MEDICATIONS:  Prenatal vitamins, insulin NPH 38 units at bedtime, NovoLog 8   units q. breakfast and 22 units q. dinner.    ALLERGIES:  NKDA.    GYNECOLOGIC HISTORY:  No history of sexually transmitted diseases or HSV.    OBSTETRICAL HISTORY:  The patient is a  1.    SOCIAL HISTORY:  The patient is .  She denies tobacco, alcohol or   drugs.    OBJECTIVE:  VITAL SIGNS:  Afebrile.  Vital signs stable.  GENERAL:  She is a well-developed, well-nourished female, in no acute   distress.  ABDOMEN:  Gravid, vertex on ultrasound and by palpation.  PELVIC:  Tocometer shows contractions every 2-7 minutes.  Fetal heart tones   baseline 130s with moderate long-term variability, accels present, no   variables, no decels.    LABORATORY DATA:  CBC, white blood cell count 11.0, hemoglobin 13.3,   hematocrit 40.6, platelets 155.  CBG is 80.    ASSESSMENT AND PLAN:   This is a 34-year-old  1, para 0 at 38 weeks and   2 days here for induction of labor secondary to gestational diabetes mellitus   A2.  1.  Rationale for proceeding with induction has been reviewed with the   patient.  She has an unfavorable cervix and is primiparous.  Induction has   been initiated with Cytotec.  2.  Category 1 tracing.  3.  Group B streptococcus positive and on penicillin.  4.  Gestational diabetes was currently well controlled.  We plan to maintain   glucose range of  at the time of delivery.  5.  The patient may have epidural on demand.       ____________________________________     SHERITA DANIELS, MD [facility]    DD:  2019 08:08:28  DT:  2019 09:26:35    D#:  [id number]  Job#:  [id number]

## 2019-07-08 NOTE — CARE PLAN
Problem: Pain  Goal: Alleviation of Pain or a reduction in pain to the patient's comfort goal  Outcome: PROGRESSING AS EXPECTED  Pt states feeling comfortable and free from pain at this time. Pain management options for labor discussed, pt states she does not want IV analgesic but may want an epidural once she is in pain. Will continue to assess.    Problem: Risk for Infection, Impaired Wound Healing  Goal: Remain free from signs and symptoms of infection  Outcome: PROGRESSING AS EXPECTED  Pt is afebrile and free from s/s of infection at this time. Will continue to assess.

## 2019-07-08 NOTE — PROGRESS NOTES
0700- Report received from SEAN Dhaliwal RN.Pt resting comfortably. Declines interventions at this time. Morning FS-80.     0745-  Working updated/at bedside. BS Us confirmed Vertex presentation. SVE 1cm. Cyctotec placed. Pt able to eat light breakfast.     1200- SVE at 2/50/-3. FS- 80. Dr Zepeda updated with morning FS. Dr Cook updated. Orders for IV pitocin (see mar). Pt requesting to eat. Light lunch ok.     1420-  Working at bedside. SVE at 2/50/-3. AROM with scant fluid. IUPC placed.    1845-  Working at bedside. SVE at 3/50/-2.     1900- FS -65. BS report given to MIRNA Velasquez RN. POC discussed.     1915- Dr Zepeda updated on pt FS. Hold evening NPH. Orders to check UA and FS [id number]; note ketones. Change LR to D5LR. Update Dr Zepeda with FS at 2100.

## 2019-07-08 NOTE — CARE PLAN
Problem: Pain  Goal: Alleviation of Pain or a reduction in pain to the patient's comfort goal    Intervention: Pain Management - Epidural/Spinal  Discussed all pain management options with pt. Pt desires no IV pain medications but may possible want an epidural later in her labor process and will notify RN when desired.       Problem: Risk for Infection, Impaired Wound Healing  Goal: Remain free from signs and symptoms of infection    Intervention: Infection prevention measures  Discussed importance of hand hygiene with pt and family at bedside. VSS, pt is afebrile and remains free from s/s of infection at this time.

## 2019-07-08 NOTE — PROGRESS NOTES
, [facility] , [place] 38w1d. Pt presents to L&D with for scheduled IOL for GDM. Pt denies LOF, VB, or UCs and reports + fetal movement. Pt escorted to labor room, oriented to room and unit, and external monitors applied. POC discussed with pt and family and encouraged to state needs or questions at any time.    2220 SVE by RN /alessia    0317 SVE by RN -/alessia    0700 Report given to JESUS Miranda RN at bedside

## 2019-07-09 ENCOUNTER — ANESTHESIA EVENT (OUTPATIENT)
Dept: OBGYN | Facility: MEDICAL CENTER | Age: 34
End: 2019-07-09
Payer: COMMERCIAL

## 2019-07-09 ENCOUNTER — ANESTHESIA (OUTPATIENT)
Dept: OBGYN | Facility: MEDICAL CENTER | Age: 34
End: 2019-07-09
Payer: COMMERCIAL

## 2019-07-09 LAB
GLUCOSE BLD-MCNC: 85 MG/DL (ref 65–99)
GLUCOSE BLD-MCNC: 85 MG/DL (ref 65–99)
GLUCOSE BLD-MCNC: 86 MG/DL (ref 65–99)
GLUCOSE BLD-MCNC: 89 MG/DL (ref 65–99)

## 2019-07-09 PROCEDURE — 700101 HCHG RX REV CODE 250: Performed by: ANESTHESIOLOGY

## 2019-07-09 PROCEDURE — 700105 HCHG RX REV CODE 258: Performed by: OBSTETRICS & GYNECOLOGY

## 2019-07-09 PROCEDURE — 0HQ9XZZ REPAIR PERINEUM SKIN, EXTERNAL APPROACH: ICD-10-PCS | Performed by: OBSTETRICS & GYNECOLOGY

## 2019-07-09 PROCEDURE — A9270 NON-COVERED ITEM OR SERVICE: HCPCS | Performed by: OBSTETRICS & GYNECOLOGY

## 2019-07-09 PROCEDURE — 700102 HCHG RX REV CODE 250 W/ 637 OVERRIDE(OP): Performed by: OBSTETRICS & GYNECOLOGY

## 2019-07-09 PROCEDURE — 700111 HCHG RX REV CODE 636 W/ 250 OVERRIDE (IP)

## 2019-07-09 PROCEDURE — 82962 GLUCOSE BLOOD TEST: CPT

## 2019-07-09 PROCEDURE — 59409 OBSTETRICAL CARE: CPT

## 2019-07-09 PROCEDURE — 303615 HCHG EPIDURAL/SPINAL ANESTHESIA FOR LABOR

## 2019-07-09 PROCEDURE — 770002 HCHG ROOM/CARE - OB PRIVATE (112)

## 2019-07-09 PROCEDURE — 304965 HCHG RECOVERY SERVICES

## 2019-07-09 PROCEDURE — 700111 HCHG RX REV CODE 636 W/ 250 OVERRIDE (IP): Performed by: OBSTETRICS & GYNECOLOGY

## 2019-07-09 PROCEDURE — 700111 HCHG RX REV CODE 636 W/ 250 OVERRIDE (IP): Performed by: ANESTHESIOLOGY

## 2019-07-09 RX ORDER — SCOLOPAMINE TRANSDERMAL SYSTEM 1 MG/1
1 PATCH, EXTENDED RELEASE TRANSDERMAL
Status: DISCONTINUED | OUTPATIENT
Start: 2019-07-09 | End: 2019-07-11 | Stop reason: HOSPADM

## 2019-07-09 RX ORDER — ROPIVACAINE HYDROCHLORIDE 2 MG/ML
INJECTION, SOLUTION EPIDURAL; INFILTRATION; PERINEURAL
Status: COMPLETED
Start: 2019-07-09 | End: 2019-07-09

## 2019-07-09 RX ORDER — DEXAMETHASONE SODIUM PHOSPHATE 4 MG/ML
4 INJECTION, SOLUTION INTRA-ARTICULAR; INTRALESIONAL; INTRAMUSCULAR; INTRAVENOUS; SOFT TISSUE
Status: DISCONTINUED | OUTPATIENT
Start: 2019-07-09 | End: 2019-07-09

## 2019-07-09 RX ORDER — ACETAMINOPHEN 325 MG/1
325 TABLET ORAL EVERY 4 HOURS PRN
Status: DISCONTINUED | OUTPATIENT
Start: 2019-07-09 | End: 2019-07-11 | Stop reason: HOSPADM

## 2019-07-09 RX ORDER — SODIUM CHLORIDE, SODIUM LACTATE, POTASSIUM CHLORIDE, AND CALCIUM CHLORIDE .6; .31; .03; .02 G/100ML; G/100ML; G/100ML; G/100ML
1000 INJECTION, SOLUTION INTRAVENOUS
Status: DISCONTINUED | OUTPATIENT
Start: 2019-07-09 | End: 2019-07-09 | Stop reason: HOSPADM

## 2019-07-09 RX ORDER — DIPHENHYDRAMINE HYDROCHLORIDE 50 MG/ML
25 INJECTION INTRAMUSCULAR; INTRAVENOUS EVERY 6 HOURS PRN
Status: DISCONTINUED | OUTPATIENT
Start: 2019-07-09 | End: 2019-07-09

## 2019-07-09 RX ORDER — DOCUSATE SODIUM 100 MG/1
100 CAPSULE, LIQUID FILLED ORAL 2 TIMES DAILY PRN
Status: DISCONTINUED | OUTPATIENT
Start: 2019-07-09 | End: 2019-07-11 | Stop reason: HOSPADM

## 2019-07-09 RX ORDER — LIDOCAINE HYDROCHLORIDE AND EPINEPHRINE 15; 5 MG/ML; UG/ML
INJECTION, SOLUTION EPIDURAL PRN
Status: DISCONTINUED | OUTPATIENT
Start: 2019-07-09 | End: 2019-07-09 | Stop reason: SURG

## 2019-07-09 RX ORDER — MISOPROSTOL 200 UG/1
600 TABLET ORAL
Status: DISCONTINUED | OUTPATIENT
Start: 2019-07-09 | End: 2019-07-11 | Stop reason: HOSPADM

## 2019-07-09 RX ORDER — OXYCODONE HYDROCHLORIDE AND ACETAMINOPHEN 5; 325 MG/1; MG/1
1 TABLET ORAL EVERY 4 HOURS PRN
Status: DISCONTINUED | OUTPATIENT
Start: 2019-07-09 | End: 2019-07-11 | Stop reason: HOSPADM

## 2019-07-09 RX ORDER — SODIUM CHLORIDE, SODIUM LACTATE, POTASSIUM CHLORIDE, AND CALCIUM CHLORIDE .6; .31; .03; .02 G/100ML; G/100ML; G/100ML; G/100ML
250 INJECTION, SOLUTION INTRAVENOUS PRN
Status: DISCONTINUED | OUTPATIENT
Start: 2019-07-09 | End: 2019-07-09 | Stop reason: HOSPADM

## 2019-07-09 RX ORDER — SODIUM CHLORIDE, SODIUM LACTATE, POTASSIUM CHLORIDE, CALCIUM CHLORIDE 600; 310; 30; 20 MG/100ML; MG/100ML; MG/100ML; MG/100ML
INJECTION, SOLUTION INTRAVENOUS PRN
Status: DISCONTINUED | OUTPATIENT
Start: 2019-07-09 | End: 2019-07-11 | Stop reason: HOSPADM

## 2019-07-09 RX ORDER — HALOPERIDOL 5 MG/ML
1 INJECTION INTRAMUSCULAR EVERY 6 HOURS PRN
Status: DISCONTINUED | OUTPATIENT
Start: 2019-07-09 | End: 2019-07-09

## 2019-07-09 RX ORDER — IBUPROFEN 600 MG/1
600 TABLET ORAL EVERY 6 HOURS PRN
Status: DISCONTINUED | OUTPATIENT
Start: 2019-07-09 | End: 2019-07-11 | Stop reason: HOSPADM

## 2019-07-09 RX ORDER — ONDANSETRON 2 MG/ML
4 INJECTION INTRAMUSCULAR; INTRAVENOUS EVERY 4 HOURS PRN
Status: DISCONTINUED | OUTPATIENT
Start: 2019-07-09 | End: 2019-07-09

## 2019-07-09 RX ORDER — SODIUM CHLORIDE, SODIUM LACTATE, POTASSIUM CHLORIDE, CALCIUM CHLORIDE 600; 310; 30; 20 MG/100ML; MG/100ML; MG/100ML; MG/100ML
INJECTION, SOLUTION INTRAVENOUS CONTINUOUS
Status: DISCONTINUED | OUTPATIENT
Start: 2019-07-09 | End: 2019-07-10

## 2019-07-09 RX ORDER — ROPIVACAINE HYDROCHLORIDE 2 MG/ML
INJECTION, SOLUTION EPIDURAL; INFILTRATION; PERINEURAL CONTINUOUS
Status: DISCONTINUED | OUTPATIENT
Start: 2019-07-09 | End: 2019-07-09

## 2019-07-09 RX ORDER — ROPIVACAINE HYDROCHLORIDE 2 MG/ML
INJECTION, SOLUTION EPIDURAL; INFILTRATION PRN
Status: DISCONTINUED | OUTPATIENT
Start: 2019-07-09 | End: 2019-07-09 | Stop reason: SURG

## 2019-07-09 RX ORDER — OXYCODONE AND ACETAMINOPHEN 10; 325 MG/1; MG/1
1 TABLET ORAL EVERY 4 HOURS PRN
Status: DISCONTINUED | OUTPATIENT
Start: 2019-07-09 | End: 2019-07-11 | Stop reason: HOSPADM

## 2019-07-09 RX ADMIN — Medication 125 ML/HR: at 17:08

## 2019-07-09 RX ADMIN — SODIUM CHLORIDE, POTASSIUM CHLORIDE, SODIUM LACTATE AND CALCIUM CHLORIDE: 600; 310; 30; 20 INJECTION, SOLUTION INTRAVENOUS at 09:25

## 2019-07-09 RX ADMIN — ROPIVACAINE HYDROCHLORIDE 100 ML: 2 INJECTION, SOLUTION EPIDURAL; INFILTRATION; PERINEURAL at 09:21

## 2019-07-09 RX ADMIN — Medication 23 MILLI-UNITS/MIN: at 07:34

## 2019-07-09 RX ADMIN — SODIUM CHLORIDE, SODIUM LACTATE, POTASSIUM CHLORIDE, CALCIUM CHLORIDE AND DEXTROSE MONOHYDRATE: 5; 600; 310; 30; 20 INJECTION, SOLUTION INTRAVENOUS at 04:45

## 2019-07-09 RX ADMIN — SODIUM CHLORIDE 2.5 MILLION UNITS: 9 INJECTION, SOLUTION INTRAVENOUS at 02:47

## 2019-07-09 RX ADMIN — ROPIVACAINE HYDROCHLORIDE 8 ML: 2 INJECTION, SOLUTION EPIDURAL; INFILTRATION at 10:53

## 2019-07-09 RX ADMIN — SODIUM CHLORIDE 2.5 MILLION UNITS: 9 INJECTION, SOLUTION INTRAVENOUS at 15:06

## 2019-07-09 RX ADMIN — ROPIVACAINE HYDROCHLORIDE 100 ML: 2 INJECTION, SOLUTION EPIDURAL; INFILTRATION at 09:21

## 2019-07-09 RX ADMIN — LIDOCAINE HYDROCHLORIDE,EPINEPHRINE BITARTRATE 3 ML: 15; .005 INJECTION, SOLUTION EPIDURAL; INFILTRATION; INTRACAUDAL; PERINEURAL at 09:09

## 2019-07-09 RX ADMIN — SODIUM CHLORIDE 2.5 MILLION UNITS: 9 INJECTION, SOLUTION INTRAVENOUS at 06:46

## 2019-07-09 RX ADMIN — SODIUM CHLORIDE, SODIUM LACTATE, POTASSIUM CHLORIDE, CALCIUM CHLORIDE AND DEXTROSE MONOHYDRATE: 5; 600; 310; 30; 20 INJECTION, SOLUTION INTRAVENOUS at 13:43

## 2019-07-09 RX ADMIN — SODIUM CHLORIDE 2.5 MILLION UNITS: 9 INJECTION, SOLUTION INTRAVENOUS at 11:17

## 2019-07-09 RX ADMIN — ROPIVACAINE HYDROCHLORIDE 2 ML: 2 INJECTION, SOLUTION EPIDURAL; INFILTRATION at 09:08

## 2019-07-09 RX ADMIN — IBUPROFEN 600 MG: 600 TABLET ORAL at 19:16

## 2019-07-09 RX ADMIN — LIDOCAINE HYDROCHLORIDE,EPINEPHRINE BITARTRATE 2 ML: 15; .005 INJECTION, SOLUTION EPIDURAL; INFILTRATION; INTRACAUDAL; PERINEURAL at 09:12

## 2019-07-09 ASSESSMENT — PAIN SCALES - GENERAL: PAIN_LEVEL: 2

## 2019-07-09 NOTE — PROGRESS NOTES
"S: States CTX are more painful but she is also sleeping through them.      O: /56   Pulse (!) 106   Temp 36.7 °C (98 °F) (Temporal)   Resp 16   Ht 1.676 m (5' 6\")   Wt 114.3 kg (252 lb)   Gen: NAD  SVE: 3-4/50/-3   IUPC: CTX q2-4  FHT: Baseline 130 with moderate LTV. +accels. No variables/decels     CBG 85     A/P 35yo  @ 38w1d, IOL for GDMA2  1. Labor:          -Continue pitocin    -discussed lack of cervical change. Fetal surveillance reassuring but     discussed prolonged labor. Desires continued IOL at this time.     Discussed potential need for csxn.   2. Cat I  3. Pain             -Denies needs currently  4. GBS positive on PCN  5. GDM            -Currently well controlled  "

## 2019-07-09 NOTE — PROGRESS NOTES
S: Feeling cramping but still nothing painful      O: AF VSS  Gen: NAD  SVE: 3-4/50/-3.   IUPC: CTX q2-4  FHT: Baseline 130 with moderate LTV. +accels. No variables/decels     CBG 75     A/P 35yo  @ 38w1d, IOL for GDMA2  1. Labor:          -Continue pitocin  2. Cat I  3. Pain             -Denies needs currently  4. GBS positive on PCN  5. GDM            -Currently well controlled

## 2019-07-09 NOTE — CARE PLAN
Problem: Pain  Goal: Alleviation of Pain or a reduction in pain to the patient's comfort goal    Intervention: Pain Management - Epidural/Spinal  Epidural placed for pain management to meet pt's comfort goal.      Problem: Risk for Infection, Impaired Wound Healing  Goal: Remain free from signs and symptoms of infection    Intervention: Use aseptic technique during vaginal examination  Aseptic technique utilized during vaginal exams to decrease risk of infection.

## 2019-07-09 NOTE — PROGRESS NOTES
"S: Feeling cramping     O: /88   Pulse 89   Temp 36.7 °C (98 °F)   Ht 1.676 m (5' 6\")   Wt 114.3 kg (252 lb)   Gen: NAD  SVE: 3/50/-3. Bloody show present   IUPC: CTX q2-4  FHT: Baseline 130 with moderate LTV. +accels. No variables/decels    CBG 95     A/P 35yo  @ 38w1d, IOL for GDMA2  1. Labor:          -Continue pitocin  2. Cat I  3. Pain             -Denies needs currently  4. GBS positive  5. GDM            -Currently well controlled  "

## 2019-07-09 NOTE — ANESTHESIA PREPROCEDURE EVALUATION
Relevant Problems   No relevant active problems       Physical Exam    Airway   Mallampati: II  TM distance: >3 FB  Neck ROM: full       Cardiovascular - normal exam  Rhythm: regular  Rate: normal  (-) murmur     Dental - normal exam         Pulmonary - normal exam  Breath sounds clear to auscultation     Abdominal    Neurological - normal exam                 Anesthesia Plan    ASA 2       Plan - CSE   Neuraxial block will be labor analgesia                  Informed Consent:    Anesthetic plan and risks discussed with patient.      Dm with pregnancy; insulin

## 2019-07-09 NOTE — CARE PLAN
Problem: Knowledge Deficit  Goal: Patient/Support person demonstrates understanding regarding the progression of labor, available options and participates in decision-making process    Intervention: Learning assessment and teaching  Pt will voice concerns and questions throughout.      Problem: Pain  Goal: Alleviation of Pain or a reduction in pain to the patient's comfort goal    Intervention: Pain Management - Non Pharmacologic techniques. Examples: Relaxation, Distraction, Massage  Pt plans for non-pharmacological labor at this time, comfort measures given. Pt will notify RN if pain becomes intolerable for other options

## 2019-07-09 NOTE — PROGRESS NOTES
1855- received bedside report from ARLETTE Brooks. Pt is sitting up visiting with family at bedside, pt tolerable at this time. abx infusing (see mar). BS 68.  Cecilia RN called Dr. Zepeda for orders on NPH bedside see RN note.   LR iv fluids changed to D5LR per Dr Zepeda orders (see mar). Discussed poc with pt for BS checks q2hrs during labor D5LR infusing and UA at 2100 and NPH held, pt verbalized understanding.  2115- UA dipped with 40 ketones noted. BS 75. Dr. Zepeda called with results. Received orders to change D5LR to 125 ml/hr continuous throughout labor and continue BS checks q2hr and call if needed. Discussed poc with pt.   2235-  Working at bedside, sve performed, 3.5/50/-2. Position change, and birthing ball encouraged. Received orders for BS checks q4 until further in labor. abx infusing (see mar).  0300- sve performed, no change noted from previous check. Position changes encouraged.  0400-  Working at nurses station, updated, no new orders at this time.  0635-  Working at bedside, sve performed, no change since previous, discussed poc.   0700- report given to ARLETTE Wagner

## 2019-07-09 NOTE — PROGRESS NOTES
0700- Report received from ELSA Velasquez RN. Pt sitting on birth ball. Pt educated on pain management options.   Dr. Cook ordered increased pitocin rate. Per Dr. Cook cervical exam is a very tight 3cm and baby is very high. Pitocin infusing at 21mu with an IUPC in place.  0755- LR bolus started for epidural placement, to be placed after am C/S done. Dr. Chowdhury notified of pt's request for an epidural.  0908- Dr. Chowdhury at the bedside, timeout complete, epidural placed.  0942- /-2, BBOW, JESUS Inman, ARLETTE.  1005- pt called out for a gush of fluid. Clr fluid present on pad.  1010- SVE 3/60/-2, clr fluid present, JESUS Inman, ARLETTE.  1025- Dr. Alejandro updated on pt's status.  1030- SVE 4/-2, Dr. Alejandro reviewed tracing, if recurrent VD, amnioinfusion ordered.  1100- Dr. Chowdhury at the bedside for L-sided pain, bolus administered.  1120-FSBS 89.  1308- SVE 6/90/-1, JESUS Inman, ARLETTE.  Dr. Alejandro updated on pt's status.  1510- FSBS 85.  1548- SVE ant lip to C/+1 with test push, JESUS Inman RN.  Dr. Alejandro updated on pt's status.  1600- Start pushing.  1633- Dr. Alejandro, , viable female, apgars 7, 9. Tight nuchal x1, clamped and cut. Terminal mec.  1643-Placenta del  1900- Report given to JESUS Bay RN.

## 2019-07-09 NOTE — PROGRESS NOTES
S) pt comfortable with epidural  O)  VE: 4/80/-2  Fht's 140's reactive  TocoQ 3min  A/P IUP 38 1/7wks, IOL for A2DM   - continue pitocin - making slow change, will continue to monitor

## 2019-07-09 NOTE — ANESTHESIA PROCEDURE NOTES
CSE Block  Performed by: MARY CALIXTO  Authorized by: MARY CALIXTO     Patient Location:  OB  Start Time:  7/9/2019 9:04 AM  End Time:  7/9/2019 9:12 AM  Reason for Block: primary anesthetic and labor analgesia    patient identified, IV checked, site marked, risks and benefits discussed, surgical consent, monitors and equipment checked, pre-op evaluation and timeout performed    Patient Position:  Sitting  Prep: ChloraPrep, patient draped and sterile technique    Monitoring:  Blood pressure, continuous pulse oximetry and heart rate  Approach:  Midline  Needle Type:  Pencan  Needle Gauge:  25 G  Injection Technique:  JOSEFA air  Needle Type:  Tuohy  Needle Gauge:  17 G  Needle Length:  3.5  Loss of resistance:  8.5  Location:  L3-L4  Catheter Size:  19 G  Catheter at Skin Depth:  12.5  Test Dose:  Lidocaine 1.5% with epinephrine 1-to-200,000  Test Dose Result:  Negative   1 st pass; 2 ml 0.1% ropivicaine to spinal.         
no

## 2019-07-09 NOTE — PROGRESS NOTES
"S: Sleeping through CTX     O: /56   Pulse (!) 106   Temp 36.7 °C (98 °F) (Temporal)   Resp 16   Ht 1.676 m (5' 6\")   Wt 114.3 kg (252 lb)   Gen: NAD  SVE: 3-4/50/-3 @ 0300 per RN  IUPC: CTX q2-4  FHT: Baseline 130 with moderate LTV. +accels. No variables/decels     CBG 75     A/P 33yo  @ 38w1d, IOL for GDMA2  1. Labor:          -Continue pitocin  2. Cat I  3. Pain             -Denies needs currently  4. GBS positive on PCN  5. GDM            -Currently well controlled  "

## 2019-07-10 LAB
ERYTHROCYTE [DISTWIDTH] IN BLOOD BY AUTOMATED COUNT: 40.3 FL (ref 35.9–50)
GLUCOSE BLD-MCNC: 87 MG/DL (ref 65–99)
HCT VFR BLD AUTO: 38 % (ref 37–47)
HGB BLD-MCNC: 13.1 G/DL (ref 12–16)
MCH RBC QN AUTO: 28.5 PG (ref 27–33)
MCHC RBC AUTO-ENTMCNC: 34.5 G/DL (ref 33.6–35)
MCV RBC AUTO: 82.8 FL (ref 81.4–97.8)
PLATELET # BLD AUTO: 120 K/UL (ref 164–446)
PMV BLD AUTO: 10.8 FL (ref 9–12.9)
RBC # BLD AUTO: 4.59 M/UL (ref 4.2–5.4)
WBC # BLD AUTO: 17.8 K/UL (ref 4.8–10.8)

## 2019-07-10 PROCEDURE — 700102 HCHG RX REV CODE 250 W/ 637 OVERRIDE(OP): Performed by: OBSTETRICS & GYNECOLOGY

## 2019-07-10 PROCEDURE — 700112 HCHG RX REV CODE 229: Performed by: OBSTETRICS & GYNECOLOGY

## 2019-07-10 PROCEDURE — 770002 HCHG ROOM/CARE - OB PRIVATE (112)

## 2019-07-10 PROCEDURE — 36415 COLL VENOUS BLD VENIPUNCTURE: CPT

## 2019-07-10 PROCEDURE — 85027 COMPLETE CBC AUTOMATED: CPT

## 2019-07-10 PROCEDURE — 82962 GLUCOSE BLOOD TEST: CPT

## 2019-07-10 PROCEDURE — A9270 NON-COVERED ITEM OR SERVICE: HCPCS | Performed by: OBSTETRICS & GYNECOLOGY

## 2019-07-10 RX ORDER — IBUPROFEN 600 MG/1
600 TABLET ORAL EVERY 6 HOURS PRN
Qty: 30 TAB | Refills: 1 | Status: SHIPPED | OUTPATIENT
Start: 2019-07-10 | End: 2019-07-11

## 2019-07-10 RX ADMIN — IBUPROFEN 600 MG: 600 TABLET ORAL at 21:32

## 2019-07-10 RX ADMIN — IBUPROFEN 600 MG: 600 TABLET ORAL at 10:20

## 2019-07-10 RX ADMIN — DOCUSATE SODIUM 100 MG: 100 CAPSULE, LIQUID FILLED ORAL at 10:20

## 2019-07-10 ASSESSMENT — EDINBURGH POSTNATAL DEPRESSION SCALE (EPDS)
THE THOUGHT OF HARMING MYSELF HAS OCCURRED TO ME: NEVER
I HAVE BEEN ABLE TO LAUGH AND SEE THE FUNNY SIDE OF THINGS: AS MUCH AS I ALWAYS COULD
I HAVE FELT SCARED OR PANICKY FOR NO GOOD REASON: NO, NOT MUCH
I HAVE BEEN ANXIOUS OR WORRIED FOR NO GOOD REASON: YES, SOMETIMES
I HAVE BEEN SO UNHAPPY THAT I HAVE BEEN CRYING: NO, NEVER
I HAVE BEEN SO UNHAPPY THAT I HAVE HAD DIFFICULTY SLEEPING: NOT AT ALL
I HAVE LOOKED FORWARD WITH ENJOYMENT TO THINGS: AS MUCH AS I EVER DID
I HAVE BLAMED MYSELF UNNECESSARILY WHEN THINGS WENT WRONG: NOT VERY OFTEN
I HAVE FELT SAD OR MISERABLE: NO, NOT AT ALL
THINGS HAVE BEEN GETTING ON TOP OF ME: NO, MOST OF THE TIME I HAVE COPED QUITE WELL

## 2019-07-10 NOTE — PROGRESS NOTES
0700-Patient is sleeping. Report received from Deidra Lincoln RN. Assumed care of patient.  0900-Assessment completed.  Patient progressing according to plan of care.  Patient encouraged to call for any needs.  Discussed pain management. Patient states she will ask for pain medication as needed but declines pain medication at this time.  Bonding with infant.  0925-Assisted mother of baby with breast feeding. Infant has had difficulty latching on. Lactation consultant notified.   1300-Assisted with breast feeding by lactation consultant with patient using nipple shield.  1335-Dr. Dye notified that patient requests to stay until tomorrow for assistance with breast feeding. Fingerstick accu-check results read to Dr. Dye. NPH insulin discontinued. Orders given to check a fasting accu-check in AM.

## 2019-07-10 NOTE — PROGRESS NOTES
1900- Bedside report received from JESUS Inman RN. Pt eating with family at bedside holding infant. Pt unable to move RLE. Fundus firm and bleeding is scant. Will move pt to PPU when able to ambulate.     2030- Pt up with RN assist. Pt unable to ambulate w/ assistance. Pt voids on marcy pad.    2110- Pt up to bathroom w/ two RN assist. Pt voids and sandro care performed. Transferred to PPU via wheelchair w/ infant in arms. Bedside report given to ARLETTE Loomis.

## 2019-07-10 NOTE — PROGRESS NOTES
Admitted from labor and delivery, post vag delivery in satisfactory condition. Came via wheelchair and placed to bed comfortably. IVF of LR infusing well into  left hand. Assessment done, fundus firm at Umbilicus, lochia rubra minimal. Denies pain at this time, plan of care on going. Oriented to room and call light place within reach. Will continue to monitor.

## 2019-07-10 NOTE — L&D DELIVERY NOTE
DATE OF SERVICE:  07/09/2019    This is a private patient of Dr. Reanna Cook.  The patient underwent normal   spontaneous vaginal delivery of a viable female infant over intact perineum   with epidural.  Spontaneous vaginal delivery of the head.  Tight nuchal cord   was noted.  This was clamped and cut at the perineum.  The rest of the infant   delivered through uncomplicated and thick terminal meconium was noted.  Infant   was immediately handed over to waiting nursing staff.  Spontaneous vaginal   delivery of intact placenta with 3-vessel cord.  First-degree perineal   laceration was repaired using 3-0 Vicryl.  Fundus was firm with Pitocin and   massage.  Total estimated blood loss less than 200 mL.  Apgars of the infant   were 7 and 9.  Mother and infant were both stable at the end of delivery.       ____________________________________     Corinne E. Capurro, MD [facility] / [facility]    DD:  07/09/2019 16:55:48  DT:  07/10/2019 04:01:28    D#:  [id number]  Job#:  [id number]

## 2019-07-10 NOTE — ANESTHESIA TIME REPORT
Anesthesia Start and Stop Event Times     Date Time Event    7/9/2019 0902 Anesthesia Start     1633 Anesthesia Stop        Responsible Staff  07/09/19    Name Role Begin End    Florentino Chowdhury M.D. Anesth 0902 1633        Preop Diagnosis (Free Text):  Pre-op Diagnosis             Preop Diagnosis (Codes):    Post op Diagnosis  Term pregnancy      Premium Reason  A. 3PM - 7AM    Comments: premium post 15:00

## 2019-07-10 NOTE — CARE PLAN
Problem: Altered physiologic condition related to immediate post-delivery state and potential for bleeding/hemorrhage  Goal: Patient physiologically stable as evidenced by normal lochia, palpable uterine involution and vital signs within normal limits  Outcome: PROGRESSING AS EXPECTED  Fundus firm at U,lochia rubra minimal. V/S within defined limits.     Problem: Alteration in comfort related to episiotomy, vaginal repair and/or after birth pains  Goal: Patient verbalizes acceptable pain level  Outcome: PROGRESSING AS EXPECTED  Pain controlled at this time. Will be medicated as needed.

## 2019-07-10 NOTE — ANESTHESIA QCDR
2019 [facility] Clinical Data Registry (for Quality Improvement)     Postoperative nausea/vomiting risk protocol (Adult = 18 yrs and Pediatric 3-17 yrs)- (430 and 463)  General inhalation anesthetic (NOT TIVA) with PONV risk factors: No  Provision of anti-emetic therapy with at least 2 different classes of agents: N/A  Patient DID NOT receive anti-emetic therapy and reason is documented in Medical Record: N/A    Multimodal Pain Management- (AQI59)  Patient undergoing Elective Surgery (i.e. Outpatient, or ASC, or Prescheduled Surgery prior to Hospital Admission): No  Use of Multimodal Pain Management, two or more drugs and/or interventions, NOT including systemic opioids: N/A  Exception: Documented allergy to multiple classes of analgesics: N/A    PACU assessment of acute postoperative pain prior to Anesthesia Care End- Applies to Patients Age = 18- (ABG7)  Initial PACU pain score is which of the following: < 7/10  Patient unable to report pain score: N/A    Post-anesthetic transfer of care checklist/protocol to PACU/ICU- (426 and 427)  Upon conclusion of case, patient transferred to which of the following locations: PACU/Non-ICU  Use of transfer checklist/protocol: Yes  Exclusion: Service Performed in Patient Hospital Room (and thus did not require transfer): N/A    PACU Reintubation- (AQI31)  General anesthesia requiring endotracheal intubation (ETT) along with subsequent extubation in OR or PACU: No  Required reintubation in the PACU: N/A  Extubation was a planned trial documented in the medical record prior to removal of the original airway device: N/A    Unplanned admission to ICU related to anesthesia service up through end of PACU care- (MD51)  Unplanned admission to ICU (not initially anticipated at anesthesia start time): No

## 2019-07-10 NOTE — LACTATION NOTE
This note was copied from a baby's chart.  MO has not been able to get infant latched and feeding HE used and spoon fed back with demo and instructions. Encourage to do HE feed back, skin to skin and attempt latch using shield. Feeding cues taught and teach to feed on cue or a minimum of every 2-3 hours. Call for assist as needed.

## 2019-07-10 NOTE — CONSULTS
Lactation note:  Initial visit. Discussed normal  feeding behaviors and normal course of breastfeeding at 12-24-48-72 hours, and what to expect. Discussed importance of offering breast with feeding cues or at least every 2-3 hours, and even if infant shows no interest, can do hand expression into infant's lips. Showed MOB how to hand express colostrum. Unable to hand express at this time. Encouraged to continue doing skin to skin. Discussed signs of a good latch, voiding and stooling patterns, feeding cues, stomach size, and importance of establishing milk supply with frequency of feedings.   Plan for tonight is to continue to offer breast first, if not latching well, can hand express colostrum, and refeed to infant.    Encouraged her to continue to work on deep latch, and skin 2 skin, with hand expression.  Also, Information and phone number to the [facility] & invited to breastfeeding circles.  MOB has no other questions or concerns regarding breastfeeding. Encouraged to call for assistance as needed.

## 2019-07-10 NOTE — PROGRESS NOTES
PPD#1  S) pt without c/o  O) vss  Fundus; firm  Ext; no edema  Hgb: pending  A/P PPD#1, s/p    - stable, desires d/c home this evening  A2DM - fasting bs this am 87 - stable

## 2019-07-10 NOTE — ANESTHESIA POSTPROCEDURE EVALUATION
Patient: Audrey Leyva    Procedure Summary     Date:  07/09/19 Room / Location:      Anesthesia Start:  0902 Anesthesia Stop:  1633    Procedure:  Labor Epidural Diagnosis:      Scheduled Providers:   Responsible Provider:  Florentino Chowdhury M.D.    Anesthesia Type:  CSE ASA Status:  2          Final Anesthesia Type: CSE  Last vitals  BP   Blood Pressure: 128/67    Temp   36.7 °C (98 °F)    Pulse   Pulse: 93   Resp   18    SpO2   94 %      Anesthesia Post Evaluation    Patient location during evaluation: PACU  Patient participation: complete - patient participated  Level of consciousness: awake and alert  Pain score: 2    Airway patency: patent  Anesthetic complications: no  Cardiovascular status: hemodynamically stable  Respiratory status: acceptable  Hydration status: euvolemic    PONV: none

## 2019-07-11 VITALS
SYSTOLIC BLOOD PRESSURE: 115 MMHG | DIASTOLIC BLOOD PRESSURE: 73 MMHG | BODY MASS INDEX: 40.5 KG/M2 | TEMPERATURE: 97.8 F | HEART RATE: 89 BPM | WEIGHT: 252 LBS | OXYGEN SATURATION: 95 % | HEIGHT: 66 IN | RESPIRATION RATE: 16 BRPM

## 2019-07-11 LAB — GLUCOSE BLD-MCNC: 71 MG/DL (ref 65–99)

## 2019-07-11 PROCEDURE — A9270 NON-COVERED ITEM OR SERVICE: HCPCS | Performed by: OBSTETRICS & GYNECOLOGY

## 2019-07-11 PROCEDURE — 700102 HCHG RX REV CODE 250 W/ 637 OVERRIDE(OP): Performed by: OBSTETRICS & GYNECOLOGY

## 2019-07-11 PROCEDURE — 82962 GLUCOSE BLOOD TEST: CPT

## 2019-07-11 RX ORDER — IBUPROFEN 600 MG/1
600 TABLET ORAL EVERY 6 HOURS PRN
Qty: 30 TAB | Refills: 1 | Status: SHIPPED | OUTPATIENT
Start: 2019-07-11 | End: 2022-11-21

## 2019-07-11 RX ADMIN — IBUPROFEN 600 MG: 600 TABLET ORAL at 10:36

## 2019-07-11 NOTE — PROGRESS NOTES
Spoke with lactation and discussed infant's weight loss and stooling pattern. Started MOB pumping and supplementing. Educated MOB on settings and when to pump and for how long, mom verbalized understanding.

## 2019-07-11 NOTE — DISCHARGE INSTRUCTIONS
POSTPARTUM DISCHARGE INSTRUCTIONS FOR MOM    YOB: 1985   Age: 34 y.o.               Admit Date: 2019     Discharge Date: 2019  Attending Doctor:  Reanna Cook M.D.                  Allergies:  Patient has no known allergies.    Discharged to home by car. Discharged via wheelchair, hospital escort: Yes.  Special equipment needed: Not Applicable  Belongings with: Personal  Be sure to schedule a follow-up appointment with your primary care doctor or any specialists as instructed.     Discharge Plan:   Influenza Vaccine Indication: Indicated: Not available from distributor/    REASONS TO CALL YOUR OBSTETRICIAN:  1.   Persistent fever or shaking chills (Temperature higher than 100.4)  2.   Heavy bleeding (soaking more than 1 pad per hour); Passing clots  3.   Foul odor from vagina  4.   Mastitis (Breast infection; breast pain, chills, fever, redness)  5.   Urinary pain, burning or frequency  6.   Episiotomy infection  7.   Abdominal incision infection  8.   Severe depression longer than 24 hours    HAND WASHING  · Prior to handling the baby.  · Before breastfeeding or bottle feeding baby.  · After using the bathroom or changing the baby's diaper.    WOUND CARE  Ask your physician for additional care instructions.  In general:    ·  Incision:      · Keep clean and dry.    · Do NOT lift anything heavier than your baby for up to 6 weeks.    · There should not be any opening or pus.      VAGINAL CARE  · Nothing inside vagina for 6 weeks: no sexual intercourse, tampons or douching.  · Bleeding may continue for 2-4 weeks.  Amount may vary.    · Call your physician for heavy bleeding which means soaking more than 1 pad per hour    BIRTH CONTROL  · It is possible to become pregnant at any time after delivery and while breastfeeding.  · Plan to discuss a method of birth control with your physician at your follow up visit. visit.    DIET AND ELIMINATION  · Eating more fiber (bran cereal,  "fruits, and vegetables) and drinking plenty of fluids will help to avoid constipation.  · Urinary frequency after childbirth is normal.    POSTPARTUM BLUES  During the first few days after birth, you may experience a sense of the \"blues\" which may include impatience, irritability or even crying.  These feeling come and go quickly.  However, as many as 1 in 10 women experience emotional symptoms known as postpartum depression.    Postpartum depression:  May start as early as the second or third day after delivery or take several weeks or months to develop.  Symptoms of \"blues\" are present, but are more intense:  Crying spells; loss of appetite; feelings of hopelessness or loss of control; fear of touching the baby; over concern or no concern at all about the baby; little or no concern about your own appearance/caring for yourself; and/or inability to sleep or excessive sleeping.  Contact your physician if you are experiencing any of these symptoms.    Crisis Hotline:  · [place] Crisis Hotline:  9-982-BXHFXFS  Or 1-292.111.8842  · [place] Crisis Hotline:  1-202.629.4037  Or 192-722-8575    PREVENTING SHAKEN BABY:  If you are angry or stressed, PUT THE BABY IN THE CRIB, step away, take some deep breaths, and wait until you are calm to care for the baby.  DO NOT SHAKE THE BABY.  You are not alone, call a supporter for help.    · Crisis Call Center 24/7 crisis line 853-799-2009 or 1-891.788.6180  · You can also text them, text \"ANSWER\" to 120946    QUIT SMOKING/TOBACCO USE:  I understand the use of any tobacco products increases my chance of suffering from future heart disease and could cause other illnesses which may shorten my life. Quitting the use of tobacco products is the single most important thing I can do to improve my health. For further information on smoking / tobacco cessation call a Toll Free Quit Line at 1-806.440.8467 (*[employer] [place]) or 1-204.689.5076 (American Lung Association) or you can " access the web based program at www.lungusa.org.    · [place] Tobacco Users Help Line:  (108) 922-9277       Toll Free: 1-863.423.7863  · Quit Tobacco Program [facility] (543)559-3016    DEPRESSION / SUICIDE RISK:  As you are discharged from this [facility], it is important to learn how to keep safe from harming yourself.    Recognize the warning signs:  · Abrupt changes in personality, positive or negative- including increase in energy   · Giving away possessions  · Change in eating patterns- significant weight changes-  positive or negative  · Change in sleeping patterns- unable to sleep or sleeping all the time   · Unwillingness or inability to communicate  · Depression  · Unusual sadness, discouragement and loneliness  · Talk of wanting to die  · Neglect of personal appearance   · Rebelliousness- reckless behavior  · Withdrawal from people/activities they love  · Confusion- inability to concentrate     If you or a loved one observes any of these behaviors or has concerns about self-harm, here's what you can do:  · Talk about it- your feelings and reasons for harming yourself  · Remove any means that you might use to hurt yourself (examples: pills, rope, extension cords, firearm)  · Get professional help from the community (Mental Health, Substance Abuse, psychological counseling)  · Do not be alone:Call your Safe Contact- someone whom you trust who will be there for you.  · Call your local CRISIS HOTLINE 878-0136 or 462-381-4736  · Call your local Children's Mobile Crisis Response Team [place] (672) 270-9861 or www.BlueKite  · Call the toll free National Suicide Prevention Hotlines   · National Suicide Prevention Lifeline 611-439-MRNM (5393)  · National Hope Line Network 800-SUICIDE (940-8331)    DISCHARGE SURVEY:  Thank you for choosing [facility].  We hope we provided you with very good care.  You may be receiving a survey in the mail.  Please fill it out.   Your opinion is valuable to us.    ADDITIONAL EDUCATIONAL MATERIALS GIVEN TO PATIENT:        My signature on this form indicates that:  1.  I have reviewed and understand the above information  2.  My questions regarding this information have been answered to my satisfaction.  3.  I have formulated a plan with my discharge nurse to obtain my prescribed medication for home.

## 2019-07-11 NOTE — LACTATION NOTE
"This note was copied from a baby's chart.  Spoke with Primary RN, Rita Srivastava, regarding infant's stooling pattern and suboptimal latch with breastfeeding.  Infant's weight loss since delivery is 3.81% which is within defined limits.  However, MOB has needed assistance from nipple shield to latch infant onto the breast and infant has not voided since delivery (terminal meconium present).  Infant has voided multiple times since birth.      Assessed colostrum at MOB's right breast and was able to express 2-3 drops of colostrum with minimum to moderate effort.  MOB reported nipples and areolas are becoming sore and tender with hand expression and breastfeeding.  With the nipple shield in place, MOB reported infant is sucking \"a little bit\" and then stops.  When asked what \"a little bit\" is, MOB stated infant is able to latch for about a minute before stopping.  Out of the 15 minutes that infant was at the breast for as reported by MOB, MOB stated infant suckled for maybe half of that time.  Bruising and scabbing noted at the left nipple.     Feeding plan to be implemented to include offering infant the breast first at every feeding,  supplementation with donor breast milk per the 10-20-30 supplementation guidelines and pumping to protect milk supply.    MOB encouraged to apply expressed breast milk and Lanolin cream as instructed to sore and damaged nipples and areolas.    Lactation to follow.  "

## 2019-07-11 NOTE — LACTATION NOTE
"This note was copied from a baby's chart.  Follow up before Mom/baby are discharged. Mom is now pumping after trying to breast feed and then supplementing using the goldenrod guide for appropriate amounts of food. Baby is sleepy, was bathed earlier. Had Mom do 30 min of skin to skin hoping for some feeding cues. Came back and mom had also pumped 1 ml of colostrum. Discussed how to make optimal amount of breast milk, importance of breast and nipple stim and emptying in the first 2 weeks. Mom has a [employer] at home but wants to rent the [employer] today. Worked with her to get baby to latch. Baby was born at 38.3 wks gestation. She shows some feeding cues but isn't vigorous at breast without the nipple shield.With the shield she latched and nursed more effectively but no milk noted behind the shield afterward. Watched Mom place shield which didn't allow for her nipple to be sucked into the shield. Showed her how to do the \"sombrero\" placement, nipple went into shield and baby nursed eagerly. Baby nursed consistently for about 5 min and fell asleep. Went over Mom's plan for home.    Plan:  Massage breasts and place nipple shield so that nipple gets sucked into the shield.  Hold baby in football hold, close to Mom's chest, nipple to nose, stimulate to get mouth to widely open.  Once latched snug baby's chin into Mom's breast.   Baby should be rhythmically nursing, in a position so eyes are looking up at Mom, nose is clear to breathe.  After breastfeeding, supplement with Mom's pumped milk or formula using amounts on the yellow guide. Always give breast milk first.  Rent a hospital grade pump for at least 2 wks  Make appointment with a lactation consultant for follow up for next Monday.  Keep track of intake and output like in the hospital.    "

## 2019-07-11 NOTE — CARE PLAN
Problem: Altered physiologic condition related to immediate post-delivery state and potential for bleeding/hemorrhage  Goal: Patient physiologically stable as evidenced by normal lochia, palpable uterine involution and vital signs within normal limits  Outcome: PROGRESSING AS EXPECTED  Fundus was firm at umbilicus and lochia is light rubra.    Problem: Alteration in comfort related to episiotomy, vaginal repair and/or after birth pains  Goal: Patient verbalizes acceptable pain level  Outcome: PROGRESSING AS EXPECTED  Patient was medicated once with ibuprofen for complaint of uterine cramping and perineal discomfort with stated relief. Patient is also using TUCKS and Dermoplast spray for complaint of perineal discomfort.

## 2019-07-11 NOTE — CARE PLAN
Problem: Altered physiologic condition related to immediate post-delivery state and potential for bleeding/hemorrhage  Goal: Patient physiologically stable as evidenced by normal lochia, palpable uterine involution and vital signs within normal limits  Outcome: PROGRESSING AS EXPECTED  Patient physiologically stable as evidenced by normal lochia, palpable uterine involution and VS are WDL.    Problem: Pain Management  Goal: Pain level will decrease to patient's comfort goal  Outcome: PROGRESSING AS EXPECTED  Pt request pain medication as needed, will continue to monitor.

## 2019-07-11 NOTE — PROGRESS NOTES
PP#2    Pt is doing well and ready to go home today    VS afebrile  abd fundus firm and not tender  Gyn light flow    Stable post partum    Home, rx motrin, instructions reviewed, follow up in 6 week

## 2019-07-11 NOTE — PROGRESS NOTES
Assumed care. Assessment completed, VSS. Fundus firm, lochia light. Pt. Ambulating and voiding. Pt. Requests pain medication as needed.POC discussed, all questions answered. Encouraged to call with needs.

## 2019-07-31 NOTE — DISCHARGE SUMMARY
DATE OF DISCHARGE:  07/11/2019    DIAGNOSES:  1.  Term pregnancy, delivered.  2.  Gestational hypertension.  3.  Insulin controlled gestational diabetes.  4.  Positive group B strep status and maternal.    PROCEDURE:  Spontaneous vaginal delivery.    HISTORY OF PRESENT ILLNESS:  The patient is a 34-year-old parous female   admitted by Dr. Cook on 07/07 at 38+ weeks' gestation for induction of   labor.  This was medically indicated by gestational hypertension and insulin   controlled diabetes.  She had an unfavorable cervix and had misoprostol   ripening on the first day of admission.  By the second day, she was started on   Pitocin and delivered spontaneously and vaginally on 07/09, healthy female   infant with a first-degree perineal injury and normal Apgars.  There was an   average blood loss.  The patient did have antepartum antibiotics.  She   remained in the hospital for 2 postpartum days with no complications,   discharged home with postpartum hematocrit of 38.  She had regular diet.  She   did not need insulin following the delivery.  Her blood pressure remained   stable and controlled off medication.  She will follow up with her   obstetrician in 6 weeks for routine visit.       ____________________________________     MD PIETER GRAHAM / REMI    DD:  07/31/2019 08:27:54  DT:  07/31/2019 14:38:48    D#:  [id number]  Job#:  [id number]

## 2021-01-20 ENCOUNTER — NON-PROVIDER VISIT (OUTPATIENT)
Dept: URGENT CARE | Facility: PHYSICIAN GROUP | Age: 36
End: 2021-01-20

## 2021-01-20 DIAGNOSIS — Z23 NEED FOR VACCINATION: ICD-10-CM

## 2021-01-20 PROCEDURE — 86580 TB INTRADERMAL TEST: CPT | Performed by: PHYSICIAN ASSISTANT

## 2021-01-21 NOTE — NON-PROVIDER
Audrey Leyva is a 35 y.o. female here for a non-provider visit for PPD placement -- Step 1 of 1    Reason for PPD:  school requirement    1. TB evaluation questionnaire completed by patient? Yes      -  If any answers marked yes did you contact a provider prior to placing? Not Indicated  2.  Patient notified to return to clinic for reading on: Friday 1/22/21 after 1643 or Saturday 1/23/21 before 1643   3.  PPD Placement documentation completed on TB evaluation questionnaire? Yes  4.  Location of TB evaluation questionnaire filed: [facility]

## 2021-01-23 ENCOUNTER — NON-PROVIDER VISIT (OUTPATIENT)
Dept: URGENT CARE | Facility: PHYSICIAN GROUP | Age: 36
End: 2021-01-23

## 2021-01-23 ENCOUNTER — HOSPITAL ENCOUNTER (OUTPATIENT)
Dept: RADIOLOGY | Facility: MEDICAL CENTER | Age: 36
End: 2021-01-23
Attending: PHYSICIAN ASSISTANT
Payer: COMMERCIAL

## 2021-01-23 DIAGNOSIS — R76.11 POSITIVE PPD: ICD-10-CM

## 2021-01-23 LAB — TB WHEAL 3D P 5 TU DIAM: NORMAL MM

## 2021-01-23 PROCEDURE — 71045 X-RAY EXAM CHEST 1 VIEW: CPT

## 2021-06-14 ENCOUNTER — EMPLOYEE HEALTH (OUTPATIENT)
Dept: OCCUPATIONAL MEDICINE | Facility: CLINIC | Age: 36
End: 2021-06-14

## 2021-06-14 ENCOUNTER — HOSPITAL ENCOUNTER (OUTPATIENT)
Facility: MEDICAL CENTER | Age: 36
End: 2021-06-14
Attending: NURSE PRACTITIONER
Payer: COMMERCIAL

## 2021-06-14 ENCOUNTER — EH NON-PROVIDER (OUTPATIENT)
Dept: OCCUPATIONAL MEDICINE | Facility: CLINIC | Age: 36
End: 2021-06-14

## 2021-06-14 VITALS
HEIGHT: 66 IN | SYSTOLIC BLOOD PRESSURE: 116 MMHG | BODY MASS INDEX: 41.78 KG/M2 | RESPIRATION RATE: 14 BRPM | DIASTOLIC BLOOD PRESSURE: 74 MMHG | WEIGHT: 260 LBS | HEART RATE: 84 BPM

## 2021-06-14 DIAGNOSIS — Z02.89 ENCOUNTER FOR OCCUPATIONAL HEALTH ASSESSMENT: ICD-10-CM

## 2021-06-14 DIAGNOSIS — Z02.1 PRE-EMPLOYMENT DRUG SCREENING: ICD-10-CM

## 2021-06-14 DIAGNOSIS — Z02.1 PRE-EMPLOYMENT HEALTH SCREENING EXAMINATION: ICD-10-CM

## 2021-06-14 LAB
AMP AMPHETAMINE: NORMAL
BAR BARBITURATES: NORMAL
BZO BENZODIAZEPINES: NORMAL
COC COCAINE: NORMAL
INT CON NEG: NORMAL
INT CON POS: NORMAL
MDMA ECSTASY: NORMAL
MET METHAMPHETAMINES: NORMAL
MTD METHADONE: NORMAL
OPI OPIATES: NORMAL
OXY OXYCODONE: NORMAL
PCP PHENCYCLIDINE: NORMAL
POC URINE DRUG SCREEN OCDRS: NEGATIVE
THC: NORMAL

## 2021-06-14 PROCEDURE — 94375 RESPIRATORY FLOW VOLUME LOOP: CPT | Performed by: NURSE PRACTITIONER

## 2021-06-14 PROCEDURE — 86480 TB TEST CELL IMMUN MEASURE: CPT | Performed by: NURSE PRACTITIONER

## 2021-06-14 PROCEDURE — 80305 DRUG TEST PRSMV DIR OPT OBS: CPT | Performed by: NURSE PRACTITIONER

## 2021-06-15 LAB
GAMMA INTERFERON BACKGROUND BLD IA-ACNC: 0.02 IU/ML
M TB IFN-G BLD-IMP: NEGATIVE
M TB IFN-G CD4+ BCKGRND COR BLD-ACNC: 0.01 IU/ML
MITOGEN IGNF BCKGRD COR BLD-ACNC: >10 IU/ML
QFT TB2 - NIL TBQ2: 0.01 IU/ML

## 2021-06-18 ENCOUNTER — EH NON-PROVIDER (OUTPATIENT)
Dept: OCCUPATIONAL MEDICINE | Facility: CLINIC | Age: 36
End: 2021-06-18

## 2021-12-07 ENCOUNTER — HOSPITAL ENCOUNTER (OUTPATIENT)
Dept: LAB | Facility: MEDICAL CENTER | Age: 36
End: 2021-12-07
Attending: FAMILY MEDICINE
Payer: COMMERCIAL

## 2021-12-07 ENCOUNTER — OFFICE VISIT (OUTPATIENT)
Dept: URGENT CARE | Facility: PHYSICIAN GROUP | Age: 36
End: 2021-12-07
Payer: COMMERCIAL

## 2021-12-07 VITALS
HEART RATE: 112 BPM | BODY MASS INDEX: 41.08 KG/M2 | WEIGHT: 255.6 LBS | HEIGHT: 66 IN | TEMPERATURE: 98.2 F | SYSTOLIC BLOOD PRESSURE: 112 MMHG | DIASTOLIC BLOOD PRESSURE: 64 MMHG | RESPIRATION RATE: 16 BRPM | OXYGEN SATURATION: 98 %

## 2021-12-07 DIAGNOSIS — R21 RASH: ICD-10-CM

## 2021-12-07 LAB
CRP SERPL HS-MCNC: 1.99 MG/DL (ref 0–0.75)
ERYTHROCYTE [SEDIMENTATION RATE] IN BLOOD BY WESTERGREN METHOD: 16 MM/HOUR (ref 0–25)

## 2021-12-07 PROCEDURE — 85652 RBC SED RATE AUTOMATED: CPT

## 2021-12-07 PROCEDURE — 86255 FLUORESCENT ANTIBODY SCREEN: CPT

## 2021-12-07 PROCEDURE — 86038 ANTINUCLEAR ANTIBODIES: CPT

## 2021-12-07 PROCEDURE — 83516 IMMUNOASSAY NONANTIBODY: CPT | Mod: 91

## 2021-12-07 PROCEDURE — 86140 C-REACTIVE PROTEIN: CPT

## 2021-12-07 PROCEDURE — 99214 OFFICE O/P EST MOD 30 MIN: CPT | Performed by: FAMILY MEDICINE

## 2021-12-07 PROCEDURE — 36415 COLL VENOUS BLD VENIPUNCTURE: CPT

## 2021-12-07 RX ORDER — PREDNISONE 20 MG/1
40 TABLET ORAL DAILY
Qty: 10 TABLET | Refills: 0 | Status: SHIPPED | OUTPATIENT
Start: 2021-12-07 | End: 2021-12-12

## 2021-12-07 ASSESSMENT — ENCOUNTER SYMPTOMS
WEIGHT LOSS: 0
VOMITING: 0
MYALGIAS: 0
EYE DISCHARGE: 0
EYE REDNESS: 0
NAUSEA: 0

## 2021-12-07 NOTE — PROGRESS NOTES
"Subjective     Audrey Leyva is a 36 y.o. female who presents with Rash (Pt has burning rash on legs x 1 week )            1 week relatively small raised red lesions to bilateral legs.  Slight burning sensation.  No itching.  No clear trigger.  No prodrome.  No fever.  No joint pain.  No PMH autoimmune disease.  No home remedies tried.  No hot tub.  No oral lesions.  No other aggravating or alleviating factors.      Review of Systems   Constitutional: Negative for malaise/fatigue and weight loss.   Eyes: Negative for discharge and redness.   Gastrointestinal: Negative for nausea and vomiting.   Musculoskeletal: Negative for joint pain and myalgias.              Objective     /64 (BP Location: Left arm, Patient Position: Sitting, BP Cuff Size: Large adult)   Pulse (!) 112   Temp 36.8 °C (98.2 °F) (Temporal)   Resp 16   Ht 1.676 m (5' 6\")   Wt 116 kg (255 lb 9.6 oz)   SpO2 98%   BMI 41.25 kg/m²      Physical Exam  Constitutional:       General: She is not in acute distress.     Appearance: She is well-developed.   HENT:      Head: Normocephalic and atraumatic.   Eyes:      Conjunctiva/sclera: Conjunctivae normal.   Cardiovascular:      Rate and Rhythm: Normal rate and regular rhythm.      Heart sounds: Normal heart sounds. No murmur heard.      Pulmonary:      Effort: Pulmonary effort is normal.      Breath sounds: Normal breath sounds. No wheezing.   Skin:     General: Skin is warm and dry.      Findings: Rash ( Irregular nonblanching red slightly raised lesions to bilateral legs.  Few small clusters medial aspect of distal legs.) present.   Neurological:      Mental Status: She is alert and oriented to person, place, and time.                             Assessment & Plan         1. Rash  predniSONE (DELTASONE) 20 MG Tab    Sed Rate    CRP QUANTITIVE (NON-CARDIAC)    ANTI-NUCLEAR ANTIBODY SERUM    Anti-Neutrophil Cytoplasmic Antibodies Screen    Referral to Dermatology     Differential diagnosis, " natural history, supportive care, and indications for immediate follow-up discussed at length.     Clinically appears most consistent with vasculitis.  We will follow-up labs and refer to dermatology for consideration of biopsy.

## 2021-12-10 ENCOUNTER — TELEPHONE (OUTPATIENT)
Dept: SCHEDULING | Facility: IMAGING CENTER | Age: 36
End: 2021-12-10

## 2021-12-10 LAB — NUCLEAR IGG SER QL IA: NORMAL

## 2021-12-12 LAB
ANCA IFA PATTERN Q6048: NORMAL
ANCA IFA TITER Q6047: NORMAL
MYELOPEROXIDASE AB SER-ACNC: 1 AU/ML (ref 0–19)
PROTEINASE3 AB SER-ACNC: 7 AU/ML (ref 0–19)

## 2021-12-13 ENCOUNTER — OFFICE VISIT (OUTPATIENT)
Dept: MEDICAL GROUP | Facility: PHYSICIAN GROUP | Age: 36
End: 2021-12-13
Payer: COMMERCIAL

## 2021-12-13 VITALS
TEMPERATURE: 97.2 F | BODY MASS INDEX: 41.62 KG/M2 | HEART RATE: 90 BPM | DIASTOLIC BLOOD PRESSURE: 76 MMHG | OXYGEN SATURATION: 98 % | WEIGHT: 259 LBS | HEIGHT: 66 IN | RESPIRATION RATE: 20 BRPM | SYSTOLIC BLOOD PRESSURE: 118 MMHG

## 2021-12-13 DIAGNOSIS — R21 RASH: ICD-10-CM

## 2021-12-13 DIAGNOSIS — Z13.29 SCREENING FOR ENDOCRINE, NUTRITIONAL, METABOLIC AND IMMUNITY DISORDER: ICD-10-CM

## 2021-12-13 DIAGNOSIS — Z13.21 SCREENING FOR ENDOCRINE, NUTRITIONAL, METABOLIC AND IMMUNITY DISORDER: ICD-10-CM

## 2021-12-13 DIAGNOSIS — Z13.0 SCREENING FOR ENDOCRINE, NUTRITIONAL, METABOLIC AND IMMUNITY DISORDER: ICD-10-CM

## 2021-12-13 DIAGNOSIS — Z13.228 SCREENING FOR ENDOCRINE, NUTRITIONAL, METABOLIC AND IMMUNITY DISORDER: ICD-10-CM

## 2021-12-13 DIAGNOSIS — Z23 NEED FOR VACCINATION: ICD-10-CM

## 2021-12-13 DIAGNOSIS — R79.82 ELEVATED C-REACTIVE PROTEIN (CRP): ICD-10-CM

## 2021-12-13 DIAGNOSIS — R00.2 PALPITATIONS: ICD-10-CM

## 2021-12-13 PROCEDURE — 99204 OFFICE O/P NEW MOD 45 MIN: CPT | Mod: 25

## 2021-12-13 PROCEDURE — 90471 IMMUNIZATION ADMIN: CPT

## 2021-12-13 PROCEDURE — 90686 IIV4 VACC NO PRSV 0.5 ML IM: CPT

## 2021-12-13 RX ORDER — TRIAMCINOLONE ACETONIDE 1 MG/G
1 CREAM TOPICAL 2 TIMES DAILY
Qty: 80 G | Refills: 2 | Status: SHIPPED | OUTPATIENT
Start: 2021-12-13 | End: 2022-01-11

## 2021-12-13 ASSESSMENT — PATIENT HEALTH QUESTIONNAIRE - PHQ9: CLINICAL INTERPRETATION OF PHQ2 SCORE: 0

## 2021-12-13 NOTE — ASSESSMENT & PLAN NOTE
Patient reports that when she was with her previous PCP, she was undergoing evaluation for heart palpitations.  She reports that she was going to get a Zio patch for further assessment, but was unable to do this as her previous provider left the practice.  She continues to get heart palpitations several times per week.

## 2021-12-13 NOTE — PROGRESS NOTES
CC:    Chief Complaint   Patient presents with   • Establish Care   • Rash        HISTORY OF THE PRESENT ILLNESS: This is a pleasant 36 y.o. female presenting today to establish [facility], who wishes to discuss the following problems listed below:     Rash  Patient presents as a follow-up from urgent care to review rash on her lower extremities.  When she saw Dr. Rivero, she was given a full course of prednisone which she finished yesterday.  The rash is significantly improved, but is still present over bilateral lower extremities.  It is not painful or itchy.  She has no history of joint pain, no rashes on the face.  She is concerned about an elevated CRP and would like to discuss the results of her lab work today.    Palpitations  Patient reports that when she was with her previous PCP, she was undergoing evaluation for heart palpitations.  She reports that she was going to get a Zio patch for further assessment, but was unable to do this as her previous provider left the practice.  She continues to get heart palpitations several times per week.      Allergies: Patient has no known allergies.    Current Outpatient Medications   Medication Sig Dispense Refill   • triamcinolone acetonide (KENALOG) 0.1 % Cream Apply 1 Application topically 2 times a day. 80 g 2   • ibuprofen (MOTRIN) 600 MG Tab Take 1 Tab by mouth every 6 hours as needed (For cramping after delivery; do not give if patient is receiving ketorolac (Toradol)). 30 Tab 1     No current facility-administered medications for this visit.       ROS:   Constitutional: Patient denies any fever, chills, night sweats, weight loss/gain, fatigue, or malaise.   Eyes: Patient denies any photophobia, eye pain, diplopia, discharge, dryness, excessive tearing, redness, or VA changes.   ENT: Patient denies any runny nose, hoarseness, sore throat, or dysphagia.   Resp: Patient denies cough, wheezing, or shortness of breath.   CV: Patient denies any chest pain, claudication,  "cyanosis, palpitations, or edema.   GI: Patient denies any constipation, nausea, vomiting, diarrhea, bloating, abdominal pain, or dyspepsia.   MSK: Patient denies any joint pain, cramps, swelling, weakness, stiffness, or tremor  Skin: Patient denies any color changes, + rash.    Neuro: Patient denies any headache, numbness or tingling  Psych: Patient denies any suicidal or homicidal ideation, depression or anxiety.     Exam: /76 (BP Location: Left arm, Patient Position: Sitting, BP Cuff Size: Large adult)   Pulse 90   Temp 36.2 °C (97.2 °F) (Temporal)   Resp 20   Ht 1.676 m (5' 6\")   Wt 117 kg (259 lb)   SpO2 98%  Body mass index is 41.8 kg/m².      Gen: Alert and oriented x4. Well developed, well-nourished female in no apparent distress.  Skin: Warm, dry, good turgor, no rashes in visible areas or lacerations appreciated.   Eye: EOM intact, pupils equal, round and reactive, conjunctiva clear, lids normal.  Neck: Trachea midline, no masses, no thyromegaly  Lungs: Normal effort, CTA bilaterally, no wheezes, rhonchi, or rales. No stridor or audible wheezing. Equal chest expansion.   CV: Regular rate and rhythm. No murmurs, rubs, or gallops.   GI:  Soft, non-tender abdomen with no distention.   MSK: Normal gait, moves all extremities.  Neuro: Alert and oriented x 4, non-focal exam with motor and sensory grossly intact.  Ext: No clubbing, cyanosis, edema.  Psych: Normal behavior, affect and mood.  Skin: Small scaly lesion noted to the medial right ankle. Small papular, irregular and nonblanching erythematous lesions scattered over the bilateral lower extremities.         Assessment/Plan:    36 y.o. female with the followin. Elevated C-reactive protein (CRP)  New condition, not at baseline.  Patient did have an elevated CRP of 1.99.  However, her sed rate was normal there is no findings of an NICOLE.  Discussed that I am very encouraged that with these findings in combination with the lack of joint pain, " I have low suspicion for an autoimmune condition.  However, I would like to get a few more baseline labs for further evaluation.  The rash responded very well to oral steroids.  I will continue this in topical form until patient is able to get in for further evaluation.  - CBC WITH DIFFERENTIAL; Future  - APTT; Future  - URINALYSIS; Future  - triamcinolone acetonide (KENALOG) 0.1 % Cream; Apply 1 Application topically 2 times a day.  Dispense: 80 g; Refill: 2    2. Palpitations  Chronic condition, not at baseline.  Discussed with patient that cardiology should be contacting her in a week or 2 to set up outpatient Zio patch monitor.  - [facility] ZIO PATCH MONITOR; Future    3. Rash  New condition, not at baseline.  Patient did have an elevated CRP of 1.99.  However, her sed rate was normal there is no findings of an NICOLE.  Discussed that I am very encouraged that with these findings in combination with the lack of joint pain, I have low suspicion for an autoimmune condition.  However, I would like to get a few more baseline labs for further evaluation.  The rash responded very well to oral steroids.  I will continue this in topical form until patient is able to get in for further evaluation.  - CBC WITH DIFFERENTIAL; Future  - APTT; Future  - URINALYSIS; Future  - triamcinolone acetonide (KENALOG) 0.1 % Cream; Apply 1 Application topically 2 times a day.  Dispense: 80 g; Refill: 2    4. Screening for endocrine, nutritional, metabolic and immunity disorder  - Comp Metabolic Panel; Future  - HEMOGLOBIN A1C; Future  - Lipid Profile; Future  - TSH WITH REFLEX TO FT4; Future  - VITAMIN D,25 HYDROXY; Future    5. Need for vaccination  - INFLUENZA VACCINE QUAD INJ (PF)      Follow-up: Return in about 4 weeks (around 1/10/2022) for follow up on rash and labs ? pap.    Health Maintenance: Completed    I have placed the below orders and discussed them with an approved delegating provider.  The MA is performing the below orders under  the direction of Maria Isabel Rhodes MD.    Please note that this dictation was created using voice recognition software. I have made every reasonable attempt to correct obvious errors, but I expect that there are errors of grammar and possibly content that I did not discover before finalizing the note.    Electronically signed by JL Shell on December 13, 2021

## 2021-12-13 NOTE — ASSESSMENT & PLAN NOTE
Patient presents as a follow-up from urgent care to review rash on her lower extremities.  When she saw Dr. Rivero, she was given a full course of prednisone which she finished yesterday.  The rash is significantly improved, but is still present over bilateral lower extremities.  It is not painful or itchy.  She has no history of joint pain, no rashes on the face.  She is concerned about an elevated CRP and would like to discuss the results of her lab work today.

## 2021-12-14 ENCOUNTER — HOSPITAL ENCOUNTER (OUTPATIENT)
Dept: LAB | Facility: MEDICAL CENTER | Age: 36
End: 2021-12-14
Payer: COMMERCIAL

## 2021-12-14 DIAGNOSIS — Z13.21 SCREENING FOR ENDOCRINE, NUTRITIONAL, METABOLIC AND IMMUNITY DISORDER: ICD-10-CM

## 2021-12-14 DIAGNOSIS — R79.82 ELEVATED C-REACTIVE PROTEIN (CRP): ICD-10-CM

## 2021-12-14 DIAGNOSIS — Z13.29 SCREENING FOR ENDOCRINE, NUTRITIONAL, METABOLIC AND IMMUNITY DISORDER: ICD-10-CM

## 2021-12-14 DIAGNOSIS — Z13.228 SCREENING FOR ENDOCRINE, NUTRITIONAL, METABOLIC AND IMMUNITY DISORDER: ICD-10-CM

## 2021-12-14 DIAGNOSIS — Z13.0 SCREENING FOR ENDOCRINE, NUTRITIONAL, METABOLIC AND IMMUNITY DISORDER: ICD-10-CM

## 2021-12-14 LAB
ALBUMIN SERPL BCP-MCNC: 3.9 G/DL (ref 3.2–4.9)
ALBUMIN/GLOB SERPL: 1.1 G/DL
ALP SERPL-CCNC: 81 U/L (ref 30–99)
ALT SERPL-CCNC: 19 U/L (ref 2–50)
ANION GAP SERPL CALC-SCNC: 13 MMOL/L (ref 7–16)
APPEARANCE UR: CLEAR
APTT PPP: 34.5 SEC (ref 24.7–36)
AST SERPL-CCNC: 10 U/L (ref 12–45)
BASOPHILS # BLD AUTO: 0.5 % (ref 0–1.8)
BASOPHILS # BLD: 0.06 K/UL (ref 0–0.12)
BILIRUB SERPL-MCNC: 0.4 MG/DL (ref 0.1–1.5)
BILIRUB UR QL STRIP.AUTO: NEGATIVE
BUN SERPL-MCNC: 11 MG/DL (ref 8–22)
CALCIUM SERPL-MCNC: 9.1 MG/DL (ref 8.5–10.5)
CHLORIDE SERPL-SCNC: 100 MMOL/L (ref 96–112)
CHOLEST SERPL-MCNC: 152 MG/DL (ref 100–199)
CO2 SERPL-SCNC: 23 MMOL/L (ref 20–33)
COLOR UR: YELLOW
CREAT SERPL-MCNC: 0.36 MG/DL (ref 0.5–1.4)
EOSINOPHIL # BLD AUTO: 0.14 K/UL (ref 0–0.51)
EOSINOPHIL NFR BLD: 1.1 % (ref 0–6.9)
ERYTHROCYTE [DISTWIDTH] IN BLOOD BY AUTOMATED COUNT: 39.9 FL (ref 35.9–50)
EST. AVERAGE GLUCOSE BLD GHB EST-MCNC: 126 MG/DL
FASTING STATUS PATIENT QL REPORTED: NORMAL
GLOBULIN SER CALC-MCNC: 3.5 G/DL (ref 1.9–3.5)
GLUCOSE SERPL-MCNC: 92 MG/DL (ref 65–99)
GLUCOSE UR STRIP.AUTO-MCNC: NEGATIVE MG/DL
HBA1C MFR BLD: 6 % (ref 4–5.6)
HCT VFR BLD AUTO: 48.4 % (ref 37–47)
HDLC SERPL-MCNC: 48 MG/DL
HGB BLD-MCNC: 16 G/DL (ref 12–16)
IMM GRANULOCYTES # BLD AUTO: 0.06 K/UL (ref 0–0.11)
IMM GRANULOCYTES NFR BLD AUTO: 0.5 % (ref 0–0.9)
KETONES UR STRIP.AUTO-MCNC: NEGATIVE MG/DL
LDLC SERPL CALC-MCNC: 83 MG/DL
LEUKOCYTE ESTERASE UR QL STRIP.AUTO: NEGATIVE
LYMPHOCYTES # BLD AUTO: 2.78 K/UL (ref 1–4.8)
LYMPHOCYTES NFR BLD: 21.4 % (ref 22–41)
MCH RBC QN AUTO: 28 PG (ref 27–33)
MCHC RBC AUTO-ENTMCNC: 33.1 G/DL (ref 33.6–35)
MCV RBC AUTO: 84.8 FL (ref 81.4–97.8)
MICRO URNS: NORMAL
MONOCYTES # BLD AUTO: 0.65 K/UL (ref 0–0.85)
MONOCYTES NFR BLD AUTO: 5 % (ref 0–13.4)
NEUTROPHILS # BLD AUTO: 9.3 K/UL (ref 2–7.15)
NEUTROPHILS NFR BLD: 71.5 % (ref 44–72)
NITRITE UR QL STRIP.AUTO: NEGATIVE
NRBC # BLD AUTO: 0 K/UL
NRBC BLD-RTO: 0 /100 WBC
PH UR STRIP.AUTO: 6.5 [PH] (ref 5–8)
PLATELET # BLD AUTO: 333 K/UL (ref 164–446)
PMV BLD AUTO: 11.1 FL (ref 9–12.9)
POTASSIUM SERPL-SCNC: 4.2 MMOL/L (ref 3.6–5.5)
PROT SERPL-MCNC: 7.4 G/DL (ref 6–8.2)
PROT UR QL STRIP: NEGATIVE MG/DL
RBC # BLD AUTO: 5.71 M/UL (ref 4.2–5.4)
RBC UR QL AUTO: NEGATIVE
SODIUM SERPL-SCNC: 136 MMOL/L (ref 135–145)
SP GR UR STRIP.AUTO: 1.01
TRIGL SERPL-MCNC: 105 MG/DL (ref 0–149)
TSH SERPL DL<=0.005 MIU/L-ACNC: 2.12 UIU/ML (ref 0.38–5.33)
UROBILINOGEN UR STRIP.AUTO-MCNC: 0.2 MG/DL
WBC # BLD AUTO: 13 K/UL (ref 4.8–10.8)

## 2021-12-14 PROCEDURE — 85025 COMPLETE CBC W/AUTO DIFF WBC: CPT

## 2021-12-14 PROCEDURE — 83036 HEMOGLOBIN GLYCOSYLATED A1C: CPT

## 2021-12-14 PROCEDURE — 80061 LIPID PANEL: CPT

## 2021-12-14 PROCEDURE — 84443 ASSAY THYROID STIM HORMONE: CPT

## 2021-12-14 PROCEDURE — 85730 THROMBOPLASTIN TIME PARTIAL: CPT

## 2021-12-14 PROCEDURE — 80053 COMPREHEN METABOLIC PANEL: CPT

## 2021-12-14 PROCEDURE — 81003 URINALYSIS AUTO W/O SCOPE: CPT

## 2021-12-14 PROCEDURE — 82306 VITAMIN D 25 HYDROXY: CPT

## 2021-12-14 PROCEDURE — 36415 COLL VENOUS BLD VENIPUNCTURE: CPT

## 2021-12-17 ENCOUNTER — TELEMEDICINE (OUTPATIENT)
Dept: MEDICAL GROUP | Facility: PHYSICIAN GROUP | Age: 36
End: 2021-12-17
Payer: COMMERCIAL

## 2021-12-17 VITALS — BODY MASS INDEX: 40.98 KG/M2 | WEIGHT: 255 LBS | RESPIRATION RATE: 16 BRPM | HEIGHT: 66 IN

## 2021-12-17 DIAGNOSIS — R79.89 LOW SERUM CREATININE: ICD-10-CM

## 2021-12-17 DIAGNOSIS — R71.8 ELEVATED HEMATOCRIT: ICD-10-CM

## 2021-12-17 DIAGNOSIS — R79.82 ELEVATED C-REACTIVE PROTEIN (CRP): ICD-10-CM

## 2021-12-17 DIAGNOSIS — R21 RASH: ICD-10-CM

## 2021-12-17 PROCEDURE — 99214 OFFICE O/P EST MOD 30 MIN: CPT | Mod: 95

## 2021-12-17 RX ORDER — METHYLPREDNISOLONE 4 MG/1
TABLET ORAL
Qty: 21 TABLET | Refills: 0 | Status: SHIPPED | OUTPATIENT
Start: 2021-12-17 | End: 2022-01-11

## 2021-12-17 RX ORDER — CLOTRIMAZOLE AND BETAMETHASONE DIPROPIONATE 10; .64 MG/G; MG/G
1 CREAM TOPICAL 2 TIMES DAILY
Qty: 45 G | Refills: 1 | Status: SHIPPED | OUTPATIENT
Start: 2021-12-17 | End: 2022-08-22

## 2021-12-17 ASSESSMENT — FIBROSIS 4 INDEX: FIB4 SCORE: 0.24

## 2021-12-17 NOTE — ASSESSMENT & PLAN NOTE
"Patient presents for follow-up on her rash.  Last visit, she was given some Kenalog cream for a rash on her bilateral lower extremities that initially responded well to oral steroids from urgent care.  She reports that the rash is returning, worsening, and now present on her breast and abdomen.  She reports that she gets some \"zinger \"type pains throughout the shin, but the rash is not itchy.  The papules are raised and erythematous.  She reports that she does not have any pets, and again denies any recent changes of detergent, lotion, or perfumes.  "

## 2021-12-17 NOTE — PROGRESS NOTES
"Virtual Visit: Established Patient   This visit was conducted via Zoom using secure and encrypted videoconferencing technology. The patient was in a private location in the state of [place].    The patient's identity was confirmed and verbal consent was obtained for this virtual visit.    Subjective:   CC:   Chief Complaint   Patient presents with   • Follow-Up     Rash, getting worse        Audrey Leyva is a 36 y.o. female presenting for evaluation and management of:    Rash  Patient presents for follow-up on her rash.  Last visit, she was given some Kenalog cream for a rash on her bilateral lower extremities that initially responded well to oral steroids from urgent care.  She reports that the rash is returning, worsening, and now present on her breast and abdomen.  She reports that she gets some \"zinger \"type pains throughout the shin, but the rash is not itchy.  The papules are raised and erythematous.  She reports that she does not have any pets, and again denies any recent changes of detergent, lotion, or perfumes.      ROS   Denies any recent fevers or chills. No nausea or vomiting. No chest pains or shortness of breath.     No Known Allergies    Current medicines (including changes today)  Current Outpatient Medications   Medication Sig Dispense Refill   • COLLAGEN PO Take  by mouth.     • triamcinolone acetonide (KENALOG) 0.1 % Cream Apply 1 Application topically 2 times a day. 80 g 2   • ibuprofen (MOTRIN) 600 MG Tab Take 1 Tab by mouth every 6 hours as needed (For cramping after delivery; do not give if patient is receiving ketorolac (Toradol)). 30 Tab 1   • clotrimazole-betamethasone (LOTRISONE) 1-0.05 % Cream Apply 1 Application topically 2 times a day. 45 g 1   • methylPREDNISolone (MEDROL DOSEPAK) 4 MG Tablet Therapy Pack As directed on the packaging label. 21 Tablet 0     No current facility-administered medications for this visit.       Patient Active Problem List    Diagnosis Date Noted   • " "Rash 12/13/2021   • Palpitations 12/13/2021   • Obesity (BMI 35.0-39.9 without comorbidity) ([facility]) 08/08/2018       History reviewed. No pertinent family history.    She  has no past medical history on file.  She  has a past surgical history that includes cholecystectomy.       Objective:   Resp 16   Ht 1.676 m (5' 6\") Comment: per pt  Wt 116 kg (255 lb) Comment: per pt  LMP 11/26/2021   BMI 41.16 kg/m²     Physical Exam:  Constitutional: Alert, no distress, well-groomed.  Skin: No rashes in visible areas.  Eye: Round. Conjunctiva clear, lids normal. No icterus.   ENMT: Lips pink without lesions, good dentition, moist mucous membranes. Phonation normal.  Neck: No masses, no thyromegaly. Moves freely without pain.  Respiratory: Unlabored respiratory effort, no cough or audible wheeze  Psych: Alert and oriented x3, normal affect and mood.       Assessment and Plan:   The following treatment plan was discussed:     1. Rash  Discussed with patient that I do not feel there is an autoimmune component to her vasculitis component based on negative work-up thus far.  I think it would be reasonable to trial  An antifungal and steroid cream in addition to another Medrol Dosepak.  Patient was referred to vascular dermatology [facility] that told her they could not get her an appointment until March.  I am [facility] a I am hopeful they will be able to get her in sooner  - Referral to Dermatology  - clotrimazole-betamethasone 1-0.05% cream 1 application BID,   -Medrol Dospak    3. Low serum creatinine  Patient has questions about her creatinine of 0.36.  Advised that this is slightly lower than normal, her BUN is 11 and her GFR is greater than 60.  Recheck this in 2 weeks to ensure normalization  - Basic Metabolic Panel; Future    4. Elevated hematocrit  Patient has questions about her elevated hematocrit.  Discussed that she was likely dehydrated as " she was fasting for these labs.  However, we will recheck this in 2 weeks to ensure resolution  - CBC WITHOUT DIFFERENTIAL; Future      Follow-up: Return for regular follow up on 1/11.

## 2021-12-18 LAB — 25(OH)D3 SERPL-MCNC: 17 NG/ML (ref 30–80)

## 2021-12-27 ENCOUNTER — NON-PROVIDER VISIT (OUTPATIENT)
Dept: CARDIOLOGY | Facility: MEDICAL CENTER | Age: 36
End: 2021-12-27
Payer: COMMERCIAL

## 2021-12-27 DIAGNOSIS — R00.1 SINUS BRADYCARDIA: ICD-10-CM

## 2021-12-27 DIAGNOSIS — R00.0 SINUS TACHYCARDIA: ICD-10-CM

## 2021-12-27 DIAGNOSIS — I49.3 PVC (PREMATURE VENTRICULAR CONTRACTION): ICD-10-CM

## 2021-12-27 DIAGNOSIS — R00.2 PALPITATIONS: ICD-10-CM

## 2022-01-05 DIAGNOSIS — R00.2 PALPITATIONS: ICD-10-CM

## 2022-01-05 NOTE — PROGRESS NOTES
Holter orders  Received: Today  MICHA Marks R.N. Hello!     When you have a moment, would you mind putting in Holter orders for this patient?   48 hours for palpitations, ordered by Carmelo Huntley, going to TW as ADD.     Thank you!     Order placed per TW ADD.

## 2022-01-11 ENCOUNTER — OFFICE VISIT (OUTPATIENT)
Dept: MEDICAL GROUP | Facility: PHYSICIAN GROUP | Age: 37
End: 2022-01-11
Payer: COMMERCIAL

## 2022-01-11 VITALS
SYSTOLIC BLOOD PRESSURE: 114 MMHG | HEART RATE: 92 BPM | HEIGHT: 66 IN | OXYGEN SATURATION: 98 % | BODY MASS INDEX: 41.46 KG/M2 | TEMPERATURE: 98 F | DIASTOLIC BLOOD PRESSURE: 72 MMHG | WEIGHT: 258 LBS | RESPIRATION RATE: 18 BRPM

## 2022-01-11 DIAGNOSIS — R00.2 PALPITATIONS: ICD-10-CM

## 2022-01-11 DIAGNOSIS — R21 RASH: ICD-10-CM

## 2022-01-11 DIAGNOSIS — R73.03 PREDIABETES: ICD-10-CM

## 2022-01-11 LAB — EKG IMPRESSION: NORMAL

## 2022-01-11 PROCEDURE — 99213 OFFICE O/P EST LOW 20 MIN: CPT

## 2022-01-11 PROCEDURE — 93224 XTRNL ECG REC UP TO 48 HRS: CPT | Performed by: INTERNAL MEDICINE

## 2022-01-11 ASSESSMENT — PATIENT HEALTH QUESTIONNAIRE - PHQ9: CLINICAL INTERPRETATION OF PHQ2 SCORE: 0

## 2022-01-11 ASSESSMENT — FIBROSIS 4 INDEX: FIB4 SCORE: 0.24

## 2022-01-11 NOTE — ASSESSMENT & PLAN NOTE
Patient presents for follow-up on her chronic rash issue.  Last visit, she was prescribed Lotrisone and reports that this did not really improve her rash.  However, on her left leg the rash did slightly improve on its own.  Unfortunately, the rash has now spread to her right leg and is worse than her left leg.  She reports that she still has a few spots on her abdomen that are light pink.  She reiterates that the rash does not itch, is not painful.  She has an appointment with dermatology upcoming this Friday at 145.

## 2022-01-11 NOTE — ASSESSMENT & PLAN NOTE
Patient reports that she had the Holter monitor placed on December 27 for 48 hours.  The results are not available in epic yet.

## 2022-01-11 NOTE — PROGRESS NOTES
"CC:   Chief Complaint   Patient presents with   • Follow-Up     rash        HISTORY OF PRESENT ILLNESS: Patient is a 36 y.o. female established patient who presents today to discuss the following problems below:     Rash  Patient presents for follow-up on her chronic rash issue.  Last visit, she was prescribed Lotrisone and reports that this did not really improve her rash.  However, on her left leg the rash did slightly improve on its own.  Unfortunately, the rash has now spread to her right leg and is worse than her left leg.  She reports that she still has a few spots on her abdomen that are light pink.  She reiterates that the rash does not itch, is not painful.  She has an appointment with dermatology upcoming this Friday at 145.     Palpitations  Patient reports that she had the Holter monitor placed on December 27 for 48 hours.  The results are not available in epic yet.     Prediabetes  Last hemoglobin A1c in December 2020 was 6.      Allergies:Patient has no known allergies.    Review of Systems: Otherwise negative except for as stated above.      Exam: /72 (BP Location: Right arm, Patient Position: Sitting, BP Cuff Size: Large adult)   Pulse 92   Temp 36.7 °C (98 °F) (Temporal)   Resp 18   Ht 1.676 m (5' 6\")   Wt 117 kg (258 lb)   SpO2 98%  Body mass index is 41.64 kg/m².      Gen: Alert and oriented x4. Well developed, well-nourished female in no apparent distress.  Skin: Warm, dry, good turgor. Pink maculopapular rash noted to bilateral lower extremities. No exudate or erythema, no excoriations.   Eye: EOM intact, pupils equal, round and reactive, conjunctiva clear, lids normal.  Neck: Trachea midline, no masses, no thyromegaly  Lungs: Normal effort, CTA bilaterally, no wheezes, rhonchi, or rales. No stridor or audible wheezing. Equal chest expansion.   CV: Regular rate and rhythm. No murmurs, rubs, or gallops.  GI:  Soft, non-tender abdomen with no distention.   MSK: Normal gait, moves all " extremities.  Neuro: Alert and oriented x 4, non-focal exam with motor and sensory grossly intact.  Ext: No clubbing, cyanosis, edema.  Psych: Normal behavior, affect and mood.      Assessment/Plan:  36 y.o. female with the following -    1. Rash  Chronic condition, not at baseline.  Encourage patient to keep her dermatology appointment for further monitoring.  Discussed with patient that at this time I do not feel that I should start her on anything else as she has an appointment in just a few days with specialist.  Patient is in agreements.    2. Palpitations  Chronic condition, not at baseline.  Patient has not heard from the cardiology department regarding the results of her Holter monitor.  Results are not available in epic at this time.    3. Prediabetes  New condition, not at baseline.  Patient declines referral to nutrition at this time.  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  We will plan for follow-up in 3 months to recheck her A1c.  She will obtain her CBC and CMP prior to that visit to recheck her creatinine and hematocrit as well.    Follow-up: Return in about 3 months (around 4/11/2022) for recheck A1c .    Health Maintenance: Completed      Please note that this dictation was created using voice recognition software. I have made every reasonable attempt to correct obvious errors, but I expect that there are errors of grammar and possibly content that I did not discover before finalizing the note.    Electronically signed by JL Shell on January 11, 2022

## 2022-01-14 ENCOUNTER — OFFICE VISIT (OUTPATIENT)
Dept: DERMATOLOGY | Facility: IMAGING CENTER | Age: 37
End: 2022-01-14
Payer: COMMERCIAL

## 2022-01-14 VITALS — WEIGHT: 255 LBS | BODY MASS INDEX: 40.98 KG/M2 | TEMPERATURE: 97.5 F | HEIGHT: 66 IN

## 2022-01-14 DIAGNOSIS — I78.8 CAPILLARITIS: ICD-10-CM

## 2022-01-14 PROCEDURE — 99203 OFFICE O/P NEW LOW 30 MIN: CPT | Performed by: DERMATOLOGY

## 2022-01-14 ASSESSMENT — FIBROSIS 4 INDEX: FIB4 SCORE: 0.24

## 2022-01-14 NOTE — PROGRESS NOTES
CC: Rash      Subjective: new patient here for rash on legs.     HPI:   Onset:  Red rash, b/l shins  Symptoms: denies itching, burning; occurs after long shift on her feet.  Denies other precipitants known - no recent illnesses/covid known  Aggravating factors: No  Alleviating factors: No  New creams/topicals: No  New medications (up to last 6 months): no  New travel: no  Other exposures: no  Treatments: TAC, Clotrimazole-BTM, Medrol Dose Pack, Prednisone 40mg x 3 days  Had w/u in Dec with mult labs tested.     ROS: no fevers/chills. No itch.  No cough  DermPMH: no skin cancer/melanoma  No problem-specific Assessment & Plan notes found for this encounter.    Relevant PMH:obesity  Social: RN working nights on feet    PE: Gen:WDWN female in NAD. Focal exam: scattered erythematous macules on lower legs, anterior shins. 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 May benefit from compression stockings on overnight shifts; advised keeping cool when used to limit irritation  -consider Arnica topical for discoloration of skin  -RTC PRN worsening/progressive. Consider bx if occurs above knee    Hx of elevated CRP:   -advised f/u CRP/ESR to trend, prefer to see settle back into normal range - pt to discuss with PCP    F/u PRN    I have reviewed medications relevant to my specialty.

## 2022-01-29 ENCOUNTER — HOSPITAL ENCOUNTER (OUTPATIENT)
Facility: MEDICAL CENTER | Age: 37
End: 2022-01-29
Attending: PREVENTIVE MEDICINE
Payer: COMMERCIAL

## 2022-01-29 LAB
COVID ORDER STATUS COVID19: NORMAL
SARS-COV-2 RNA RESP QL NAA+PROBE: DETECTED
SPECIMEN SOURCE: ABNORMAL

## 2022-05-06 DIAGNOSIS — R73.03 PREDIABETES: ICD-10-CM

## 2022-05-11 ENCOUNTER — OFFICE VISIT (OUTPATIENT)
Dept: MEDICAL GROUP | Facility: PHYSICIAN GROUP | Age: 37
End: 2022-05-11
Payer: COMMERCIAL

## 2022-05-11 VITALS
HEIGHT: 66 IN | WEIGHT: 259 LBS | OXYGEN SATURATION: 98 % | DIASTOLIC BLOOD PRESSURE: 68 MMHG | BODY MASS INDEX: 41.62 KG/M2 | RESPIRATION RATE: 18 BRPM | TEMPERATURE: 97.6 F | SYSTOLIC BLOOD PRESSURE: 122 MMHG | HEART RATE: 94 BPM

## 2022-05-11 DIAGNOSIS — R73.03 PREDIABETES: ICD-10-CM

## 2022-05-11 DIAGNOSIS — R79.82 ELEVATED C-REACTIVE PROTEIN (CRP): ICD-10-CM

## 2022-05-11 DIAGNOSIS — M75.42 IMPINGEMENT SYNDROME OF LEFT SHOULDER: ICD-10-CM

## 2022-05-11 DIAGNOSIS — H54.7 VISION PROBLEM: ICD-10-CM

## 2022-05-11 DIAGNOSIS — M25.512 CHRONIC LEFT SHOULDER PAIN: ICD-10-CM

## 2022-05-11 DIAGNOSIS — R21 RASH: ICD-10-CM

## 2022-05-11 DIAGNOSIS — H53.40 VISUAL FIELD LOSS: ICD-10-CM

## 2022-05-11 DIAGNOSIS — R00.2 PALPITATIONS: ICD-10-CM

## 2022-05-11 DIAGNOSIS — G89.29 CHRONIC LEFT SHOULDER PAIN: ICD-10-CM

## 2022-05-11 LAB
HBA1C MFR BLD: 5.7 % (ref 0–5.6)
INT CON NEG: NEGATIVE
INT CON POS: POSITIVE

## 2022-05-11 PROCEDURE — 99214 OFFICE O/P EST MOD 30 MIN: CPT

## 2022-05-11 PROCEDURE — 83036 HEMOGLOBIN GLYCOSYLATED A1C: CPT

## 2022-05-11 RX ORDER — MELOXICAM 15 MG/1
15 TABLET ORAL DAILY
Qty: 30 TABLET | Refills: 0 | Status: SHIPPED | OUTPATIENT
Start: 2022-05-11 | End: 2022-08-22

## 2022-05-11 ASSESSMENT — FIBROSIS 4 INDEX: FIB4 SCORE: 0.25

## 2022-05-11 NOTE — ASSESSMENT & PLAN NOTE
Since her last visit, patient did follow-up with dermatology in regards to her progressively worsening rash.  Dermatology suspected that she had a variant of capillaritis, but did recommend a biopsy should the rash progress above her knees or her CRP not returned to normal baseline.  Patient reports that since that visit, she has only had 1 or 2 spots of the rash on her bilateral lower extremities that have not progressed anywhere else in her body.  She reports that this mainly happens when she is running

## 2022-05-11 NOTE — PROGRESS NOTES
CC:   Chief Complaint   Patient presents with   • Follow-Up   • Other     Left shoulder/limited range of motion - spots in field of vision        HISTORY OF PRESENT ILLNESS: Patient is a 37 y.o. female established patient who presents today to discuss the following problems below:     Rash  Since her last visit, patient did follow-up with dermatology in regards to her progressively worsening rash.  Dermatology suspected that she had a variant of capillaritis, but did recommend a biopsy should the rash progress above her knees or her CRP not returned to normal baseline.  Patient reports that since that visit, she has only had 1 or 2 spots of the rash on her bilateral lower extremities that have not progressed anywhere else in her body.  She reports that this mainly happens when she is running    Prediabetes  Patient's last hemoglobin A1c check was 6% back in January 2022.  Plan on rechecking A1c in office today.  She has made some dietary changes since that time, including a new diet program and has lost 6 pounds in the last 3 months.  She is eager to see what her A1c is    Palpitations  Results of Holter monitor as below.  SR without significant abnormalities.   Rare VPCs correlating with diary entries    Patient reports that she rarely ever experiences episodes of PVCs now.  She is still on night shifts and does note that she is somewhat dehydrated    Vision problem  Over the last week, patient has had some intermittent black spots that occur in her right eye.  She reports that she cannot identify anything that triggers it, but does feel somewhat lightheaded when it happens.  She has no numbness or tingling or weakness of any of her extremities.  She does not feel presyncopal.  She reports that she does wear contact lenses but uses this just 1 time and then discarded some.  She denies any pain in the right eye.  She has not established with an ophthalmologist, but goes in for occasional vision checks to an  "optometrist.  She does admit that she feels somewhat dehydrated over the last week.  She does not complain of any pain or redness to the right eye.  Symptoms do not accompany with facial droop or slurred speech    Left shoulder pain  Patient reports that back in December, she was lifting when she felt an acute onset of some left shoulder pain.  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  Pain is very intermittent in nature, but has not really improved    No past medical history on file.    Allergies:Patient has no known allergies.    Review of Systems: Otherwise negative except for as stated above.      Exam: /68 (BP Location: Right arm, Patient Position: Sitting, BP Cuff Size: Large adult)   Pulse 94   Temp 36.4 °C (97.6 °F) (Temporal)   Resp 18   Ht 1.676 m (5' 6\")   Wt 117 kg (259 lb)   SpO2 98%  Body mass index is 41.8 kg/m².    Gen: Alert and oriented x4. Well developed, well-nourished female in no apparent distress.  Skin: Warm, dry, good turgor, no rashes in visible areas or lacerations appreciated.   Eye: EOM intact, pupils equal, round and reactive, conjunctiva clear, lids normal.  Neck: Trachea midline, no masses, no thyromegaly  GI:  Soft, non-tender abdomen with no distention.   MSK: Normal gait, moves all extremities. Positive Neer's and Hawkin's.   Neuro: Alert and oriented x 4, non-focal exam with motor and sensory grossly intact.  Ext: No clubbing, cyanosis, edema.  Psych: Normal behavior, affect and mood.      Assessment/Plan:  37 y.o. female with the following -    1. Rash  Chronic condition, resolved.  Patient has had no further episodes of her rash aside from intermittent periods that happen when running.  Continue to monitor    2. Elevated C-reactive protein (CRP)  Recheck CRP to ensure that it is trending down  - CRP QUANTITIVE (NON-CARDIAC); Future    3. " Prediabetes  Chronic condition.  Hemoglobin A1c improved to 5.7% from 6%.  Congratulated patient on her efforts as well as her weight loss  - POCT  A1C    4. Palpitations  Chronic condition, resolved.  Reassured patient that her Holter monitor was within normal limits and showed some PVCs.  Patient will let me know if she has any further episodes    5. Impingement syndrome of left shoulder  Chronic condition.  Discussed with patient that I do suspect that she likely has some impingement of the left shoulder.  Trial of meloxicam started today as well as referral for physical therapy.  If no improvement in 6 to 12 weeks, consider further imaging versus referral to [facility]  - Referral to Physical Therapy  - meloxicam (MOBIC) 15 MG tablet; Take 1 Tablet by mouth every day.  Dispense: 30 Tablet; Refill: 0    6. Visual field loss  Acute condition.  At this juncture I have no concern for acute process such as TIA.  Discussed with patient that I would like for her to ensure that she is fully hydrated and keep monitoring her symptoms.  Red flag symptoms such as acute visual loss, pain, or erythema with recommendations for further eval to ER.  Referral placed to ophthalmology for full examination  - Referral to Ophthalmology    Follow-up: Return in about 8 weeks (around 7/6/2022) for follow up on shoulder. .    Health Maintenance: Completed      Please note that this dictation was created using voice recognition software. I have made every reasonable attempt to correct obvious errors, but I expect that there are errors of grammar and possibly content that I did not discover before finalizing the note.    Electronically signed by JL Shell on May 11, 2022

## 2022-05-11 NOTE — ASSESSMENT & PLAN NOTE
Over the last week, patient has had some intermittent black spots that occur in her right eye.  She reports that she cannot identify anything that triggers it, but does feel somewhat lightheaded when it happens.  She has no numbness or tingling or weakness of any of her extremities.  She does not feel presyncopal.  She reports that she does wear contact lenses but uses this just 1 time and then discarded some.  She denies any pain in the right eye.  She has not established with an ophthalmologist, but goes in for occasional vision checks to an optometrist.  She does admit that she feels somewhat dehydrated over the last week.  She does not complain of any pain or redness to the right eye.  Symptoms do not accompany with facial droop or slurred speech

## 2022-05-11 NOTE — ASSESSMENT & PLAN NOTE
Patient's last hemoglobin A1c check was 6% back in January 2022.  Plan on rechecking A1c in office today.  She has made some dietary changes since that time, including a new diet program and has lost 6 pounds in the last 3 months.  She is eager to see what her A1c is

## 2022-05-11 NOTE — ASSESSMENT & PLAN NOTE
Results of Holter monitor as below.  SR without significant abnormalities.   Rare VPCs correlating with diary entries    Patient reports that she rarely ever experiences episodes of PVCs now.  She is still on night shifts and does note that she is somewhat dehydrated

## 2022-05-26 ENCOUNTER — PHYSICAL THERAPY (OUTPATIENT)
Dept: PHYSICAL THERAPY | Facility: REHABILITATION | Age: 37
End: 2022-05-26
Payer: COMMERCIAL

## 2022-05-26 DIAGNOSIS — M75.42 IMPINGEMENT SYNDROME OF LEFT SHOULDER: ICD-10-CM

## 2022-05-26 PROCEDURE — 97110 THERAPEUTIC EXERCISES: CPT

## 2022-05-26 PROCEDURE — 97161 PT EVAL LOW COMPLEX 20 MIN: CPT

## 2022-05-26 ASSESSMENT — ENCOUNTER SYMPTOMS
PAIN TIMING: WHEN ACTIVE
EXACERBATED BY: LIFTING
EXACERBATED BY: OVERHEAD ACTIVITY
QUALITY: SHARP
PAIN LOCATION: ANTERIOR ASPECT OF LEFT SHOULDER
QUALITY: ACHING
ALLEVIATING FACTORS: PHARMACOTHERAPY
QUALITY: NUMBNESS
QUALITY: PULLING
PAIN SCALE AT LOWEST: 0
PAIN SCALE AT HIGHEST: 7
PAIN SCALE: 5
ALLEVIATING FACTORS: IMMOBILIZATION
PAIN TIMING: IN THE MORNING

## 2022-05-26 NOTE — OP THERAPY EVALUATION
"  Outpatient Physical Therapy  INITIAL EVALUATION    [facility] [place]  [address]  [facility] [address]  Phone:  [phone]  Fax:  [phone]    Date of Evaluation: 05/26/2022    Patient: Audrey Leyva  YOB: 1985  MRN: [id number]     Referring Provider: SISSY Holland  [address] [place] [address]   Referring Diagnosis Impingement syndrome of left shoulder [M75.42]     Time Calculation  Start time: 0900  Stop time: 0942 Time Calculation (min): 42 minutes         Chief Complaint: Shoulder Injury (Left )    Visit Diagnoses     ICD-10-CM   1. Impingement syndrome of left shoulder  M75.42       Date of onset of impairment: 12/26/2021    Subjective:   History of Present Illness:     Date of onset:  12/26/2021    Mechanism of injury:  Patient is a 37 year old female who has been referred to physical therapy for left shoulder pain. Per recent office visit with PCP on 5/11/22, \"Patient reports that back in December, she was lifting when she felt an acute onset of some left shoulder pain.  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 She felt a sudden pain in the front of her left shoulder. She has not modified her job duties at this time and therefore, has noted progressively worsening pain with a numbness sensation extending down her biceps and into her elbow. Describes pain as an aching in the front of her shoulder which improves with limiting ROM, anti-inflammatories, and rest. Pain is worse with boosting patients and lifting her 3 year old daughter. Lifting her daughter can result in sharp pain in front of shoulder.     She denies " left upper extremity weakness or numbness into the fingers.   Sleep disturbance:  Not disrupted (Sore upon waking after sleeping on left side however. )  Pain:     Current pain ratin    At best pain ratin    At worst pain ratin    Location:  Anterior aspect of left shoulder     Quality:  Sharp, aching, numbness and pulling    Pain timing:  In the morning and when active    Relieving factors:  Immobilization and pharmacotherapy (Guarded position)    Aggravating factors:  Lifting and overhead activity    Progression:  Worsening  Social Support:     Lives with:  Young children  Hand dominance:  Right  Diagnostic Tests:     None    Treatments:     None    Activities of Daily Living:     Patient reported ADL status: Able to perform all ADLs and IADLs at this time with increased pain. Not avoiding any activities at this time.   Patient Goals:     Patient goals for therapy:  Decreased pain and increased motion    Other patient goals:  Lifting child and perform job duties without pain.       No past medical history on file.  Past Surgical History:   Procedure Laterality Date   • CHOLECYSTECTOMY       Social History     Tobacco Use   • Smoking status: Former Smoker     Quit date: 2018     Years since quitting: 3.4   • Smokeless tobacco: Never Used   Substance Use Topics   • Alcohol use: Not Currently     Family and Occupational History     Socioeconomic History   • Marital status: Single     Spouse name: Not on file   • Number of children: Not on file   • Years of education: Not on file   • Highest education level: Not on file   Occupational History   • Not on file       Objective     Postural Observations  Seated posture: fair  Standing posture: fair        Cervical Screen    Cervical range of motion within normal limits    Neurological Testing     Sensation     Shoulder     Right Shoulder   Intact: light touch    Comments   Left light touch: Diminished at C3 dermatome but intact at all others.      Reflexes   Left   Biceps (C5/C6): normal (2+)  Triceps (C7): normal (2+)    Right   Biceps (C5/C6): normal (2+)  Triceps (C7): normal (2+)    Palpation   Left   No palpable tenderness to the biceps, cervical paraspinals, infraspinatus, levator scapulae, rhomboids, supraspinatus, teres major, teres minor, thoracic paraspinals and triceps.   Tenderness of the anterior deltoid, brachioradialis, pectoralis major, pectoralis minor and upper trapezius.     Left Shoulder Comments  Left pectoralis major: Caused increase in numbness down left arm .     Tenderness     Left Shoulder   Tenderness in the supraspinatus tendon. No tenderness in the AC joint, biceps tendon (proximal), bicipital groove, clavicle, infraspinatus tendon, lateral scapula, medial scapula, scapular spine and subscapularis tendon.     Active Range of Motion   Left Shoulder   Flexion: 160 degrees WFL  Extension: WFL and with pain  Abduction: 160 degrees WFL and with pain  External rotation BTH: T3 with pain  Internal rotation BTB: T12 with pain    Right Shoulder   Flexion: 170 degrees WFL  Abduction: 170 degrees WFL  External rotation BTH: T3   Internal rotation BTB: T12     Left Elbow   Extension: WFL  Flexion: WFL    Left Wrist   Wrist flexion: WFL  Wrist extension: WFL and with pain    Joint Play   Left Shoulder     Anterior capsule: within functional limits    Posterior capsule: within functional limits    Inferior capsule: within functional limits    Strength:      Left Shoulder   Planes of Motion   Flexion: 5   Extension: 5   Abduction: 4+   External rotation at 0°: 5   Internal rotation at 0°: 5   Isolated Muscles   Pectoralis major: 4 (painful )     Left Elbow   Flexion: 5  Extension: 5    Left Wrist/Hand   Wrist extension: 5  Wrist flexion: 5    Tests   Cervical spine   Negative cervical spine compression and cervical spine distraction.     Left Shoulder   Positive painful arc.   Negative crossover, empty can, Hawkin's, lift-off, Spurling's  sign, ULTT1, ULTT2, ULTT3 and Yergason's.     Right Shoulder   Negative Spurling's sign.     Additional Tests Details  Pain with actively assuming empty can and lift off but strong and did not reproduce pain with resistance        Therapeutic Exercises (CPT 02636):     1. Corner stretch, x 30 seconds, Added to HEP    2. Wrist extensor stretch , x 30 seconds , Added to HEP     3. Rows, x 10, Green TB , Added to HEP     4. Bilateral shoulder ER , Pain at pec major       Time-based treatments/modalities:    Physical Therapy Timed Treatment Charges  Therapeutic exercise minutes (CPT 20504): 10 minutes      Assessment, Response and Plan:   Impairments: abnormal or restricted ROM, activity intolerance, difficulty performing job, lacks appropriate home exercise program and pain with function    Assessment details:  Patient is a pleasant 37 year old female that was referred to physical therapy for left shoulder pain. Evaluation findings suggest left pec major involvement, however, would benefit from future evaluation to rule out anterior capsule involvement. Patient did present with reports of pain when assuming positions for rotator cuff (supraspinatus and infraspinatus) but muscles were strong and no pain with palpation of the distal insertion of these muscles. Therefore, suspect that this was due to active use of pec to achieve shoulder IR. Concerning is the numbness reported in the left arm, which will continue to be monitored. Suspect that pain in left wrist extensors is due to use of compensatory strategies while performing job duties. Patient would benefit from trial of skilled physical therapy to address left pec tightness, improve posture, and strengthen scapular stabilizers. Discussed with patient that it would be beneficial to decrease load at work and avoid patient transfers to allow decreased strain on left shoulder to allow faster healing.   Barriers to therapy: Lack of consistency with appointment sessions  available at this clinic at this time as well as repeated lifting that is performed at work.  Prognosis: good    Goals:   Short Term Goals:   1. Instruct in HEP for postural stretching and strengthening.   2. Further assess left anterior capsule and update POC as appropriate.   Short term goal time span:  2-4 weeks      Long Term Goals:    1. Independent with HEP for shoulder stretching and strengthening.   2. 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   3. Tolerate lifting her daughter without increased pain.   4. Score 30/130 or less on the Shoulder Pain and Disability Index to indicate decreased pain with functional activities.   Long term goal time span:  6-8 weeks    Plan:   Therapy options:  Physical therapy treatment to continue  Planned therapy interventions:  E Stim Unattended (CPT 44926), Manual Therapy (CPT 79026), Neuromuscular Re-education (CPT 53156) and Therapeutic Exercise (CPT 60280)  Frequency:  2x week  Duration in weeks:  8  Discussed with:  Patient      Functional Assessment Used  PT Functional Assessment Tool Used: Shoulder Pain and Disability  PT Functional Assessment Score: 50/130     Referring provider co-signature:  I have reviewed this plan of care and my co-signature certifies the need for services.    Certification Period: 05/26/2022 to  07/21/22    Physician Signature: ________________________________ Date: ______________

## 2022-06-01 ENCOUNTER — PHYSICAL THERAPY (OUTPATIENT)
Dept: PHYSICAL THERAPY | Facility: REHABILITATION | Age: 37
End: 2022-06-01
Payer: COMMERCIAL

## 2022-06-01 DIAGNOSIS — M75.42 IMPINGEMENT SYNDROME OF LEFT SHOULDER: ICD-10-CM

## 2022-06-01 PROCEDURE — 97110 THERAPEUTIC EXERCISES: CPT

## 2022-06-07 ENCOUNTER — PHYSICAL THERAPY (OUTPATIENT)
Dept: PHYSICAL THERAPY | Facility: REHABILITATION | Age: 37
End: 2022-06-07
Payer: COMMERCIAL

## 2022-06-07 DIAGNOSIS — M75.42 IMPINGEMENT SYNDROME OF LEFT SHOULDER: ICD-10-CM

## 2022-06-07 PROCEDURE — 97110 THERAPEUTIC EXERCISES: CPT

## 2022-06-07 NOTE — OP THERAPY DAILY TREATMENT
Outpatient Physical Therapy  DAILY TREATMENT     [facility] [place]  [address]  [facility] [address]  Phone:  [phone]  Fax:  [phone]    Date: 06/07/2022    Patient: Audrey Leyva  YOB: 1985  MRN: [id number]     Time Calculation    Start time: 1330  Stop time: 1410 Time Calculation (min): 40 minutes         Chief Complaint: L shoulder  Visit #: 3    SUBJECTIVE:  Notes she has been doing better. Trying to take it easy at work and not do too much. Reports compliance with HEP. Lifting her daughter is not as painful.     OBJECTIVE:  Current objective measures: flexion 165 with pain, ER 75 degrees with pain in 90 degrees abduction          Therapeutic Exercises (CPT 72541):     1. Stick ER, stick flexion, 1min each    2. Rows, trx x 20    3. Shoulder extension, blk band x 20    4. Bilateral shoulder ER , flasher orange x 20    5. Trx rows, x 20    6. Standing scaption, band orange progressed as part of HEP    7. Standing wall ball flexion, 1min    8. TRX pushups, x 15, table scap pushes    9. Standing ER stretch, 20 sec x 3    10. Overhead wall dribble , 1min    11. Wall ball cirlces , 1min x 2    12. Trx push ups, x 20      Therapeutic Exercise Summary: HEP instruction/performance and development. Handout provided and exercises located below:  Access Code: [email or web address]  URL: [email or web address]  Date: 06/01/2022  Prepared by: Carroll Miller    Exercises        Shoulder External Rotation and Scapular Retraction with Resistance - 1 x daily - 2 sets - 15-20 reps      Single Arm Doorway Pec Stretch at 90 Degrees Abduction (Mirrored) - 2-3 x daily - 10 reps      Scaption with Dumbbells - 1 x daily - 2 sets - 10-12 reps      Standing Bilateral Low Shoulder Row with Anchored Resistance - 1 x daily - 2 sets - 20 reps      Time-based treatments/modalities:    Physical Therapy Timed Treatment Charges  Therapeutic exercise minutes (CPT 44987): 40 minutes      Pain  rating (1-10) before treatment:  1  Pain rating (1-10) after treatment:  1-2with end range positioning.     ASSESSMENT:   Response to treatment: suspect anterior deltoid vs biceps strain and resultant tendonitis and stiffness. ROM improved and tolerance to loading progressing well.  HEP progressed and f/u's scheduled in one week. Instructed to contact office sooner if symptoms worsen.     PLAN/RECOMMENDATIONS:   Plan for treatment: therapy treatment to continue next visit.  Planned interventions for next visit: continue with current treatment.

## 2022-06-16 ENCOUNTER — PHYSICAL THERAPY (OUTPATIENT)
Dept: PHYSICAL THERAPY | Facility: REHABILITATION | Age: 37
End: 2022-06-16
Payer: COMMERCIAL

## 2022-06-16 DIAGNOSIS — M75.42 IMPINGEMENT SYNDROME OF LEFT SHOULDER: ICD-10-CM

## 2022-06-16 PROCEDURE — 97014 ELECTRIC STIMULATION THERAPY: CPT

## 2022-06-16 PROCEDURE — 97110 THERAPEUTIC EXERCISES: CPT

## 2022-06-16 NOTE — OP THERAPY DAILY TREATMENT
Outpatient Physical Therapy  DAILY TREATMENT     [facility] [place]  [address]  [place] [facility] [address]  Phone:  [phone]  Fax:  [phone]    Date: 06/16/2022    Patient: Audrey Leyva  YOB: 1985  MRN: [id number]     Time Calculation    Start time: 0430  Stop time: 0515 Time Calculation (min): 45 minutes         Chief Complaint: L shoulder  Visit #: 4    SUBJECTIVE:  Notes she has been doing better. Except for some pain barber lifting at work with excessive transfers. Lifting her daughter is not as painful but with placing her further from her does still bring on pain.      OBJECTIVE:  Current objective measures:  ROM WNL without pain. Noted pain with pressure combined with HA past neutral           Therapeutic Exercises (CPT 00068):     1. Stick ER, stick flexion, 1min each    2. Rows, band x 20 purple     3. Shoulder extension, blk band x 20    4. Bilateral shoulder ER , flasher orange x 20    5. Wall ball push ups    6. Standing scaption, band orange progressed as part of HEP    7. Standing wall ball flexion, 1min    8. Shoulder pulleys , 2min flexion 2min x ER/abduction     9. Standing ER stretch, 20 sec x 3      Therapeutic Exercise Summary: HEP instruction/performance and development. Handout provided and exercises located below:  Access Code: [email or web address]  URL: [email or web address]  Date: 06/01/2022  Prepared by: Carroll Miller    Exercises        Shoulder External Rotation and Scapular Retraction with Resistance - 1 x daily - 2 sets - 15-20 reps      Single Arm Doorway Pec Stretch at 90 Degrees Abduction (Mirrored) - 2-3 x daily - 10 reps      Scaption with Dumbbells - 1 x daily - 2 sets - 10-12 reps      Standing Bilateral Low Shoulder Row with Anchored Resistance - 1 x daily - 2 sets - 20 reps    Therapeutic Treatments and Modalities:     1. E Stim Unattended (CPT 39324), IFC and CP x 15min to L shoulder    Time-based  treatments/modalities:    Physical Therapy Timed Treatment Charges  Therapeutic exercise minutes (CPT 23228): 30 minutes      Pain rating (1-10) before treatment:  1  Pain rating (1-10) after treatment:  1-2with end range positioning.     ASSESSMENT:   Response to treatment: suspect anterior deltoid vs biceps strain and resultant tendonitis and stiffness. ROM improved and tolerance to loading progressing well. HEP to remain the same. F/u in one week for likely progressions.       PLAN/RECOMMENDATIONS:   Plan for treatment: therapy treatment to continue next visit.  Planned interventions for next visit: continue with current treatment.

## 2022-06-23 ENCOUNTER — APPOINTMENT (OUTPATIENT)
Dept: PHYSICAL THERAPY | Facility: REHABILITATION | Age: 37
End: 2022-06-23
Payer: COMMERCIAL

## 2022-06-29 ENCOUNTER — PHYSICAL THERAPY (OUTPATIENT)
Dept: PHYSICAL THERAPY | Facility: REHABILITATION | Age: 37
End: 2022-06-29
Payer: COMMERCIAL

## 2022-06-29 DIAGNOSIS — M75.42 IMPINGEMENT SYNDROME OF LEFT SHOULDER: ICD-10-CM

## 2022-06-29 PROCEDURE — 97014 ELECTRIC STIMULATION THERAPY: CPT

## 2022-06-29 PROCEDURE — 97110 THERAPEUTIC EXERCISES: CPT

## 2022-06-29 NOTE — OP THERAPY DAILY TREATMENT
Outpatient Physical Therapy  DAILY TREATMENT     [facility] [place]  [address]  [place] [address]  Phone:  [phone]  Fax:  [phone]    Date: 06/29/2022    Patient: Audrey Leyva  YOB: 1985  MRN: [id number]     Time Calculation    Start time: 0900  Stop time: 1000 Time Calculation (min): 60 minutes         Chief Complaint: L shoulder  Visit #: 5    SUBJECTIVE:  Notes lost of rest without any specific pain. Reports that while lifting/transfering her pain is controlled with certain modifications.     OBJECTIVE:  Current objective measures:  ROM WNL without pain except for HA of shoulder past neutral.           Therapeutic Exercises (CPT 63278):     1. Seated pullleys, 3min     2. Rows, band x 20 purple, TRX x 20    3. Shoulder extension, blk band x 20    4. Bilateral shoulder ER , flasher orange x 20    5. Table top pushups    6. Standing scaption, band orange progressed as part of HEP    7. Standing wall ball flexion, 1min    8. Shoulder pulleys , 2min flexion 2min x ER/abduction     9. Standing ER stretch, 20 sec x 3    10. 80lbs towel slide, x2min    11. 35 shrug, x12    12. Bicep curls , purple band x 20    13. Pec flys , purple band x 20      Therapeutic Exercise Summary: HEP instruction/performance and development. Handout provided and exercises located below:  Access Code: [email or web address]  URL: [email or web address]  Date: 06/29/2022  Prepared by: Carroll Miller    Exercises        Shoulder External Rotation and Scapular Retraction with Resistance - 1 x daily - 2 sets - 15-20 reps      Single Arm Doorway Pec Stretch at 90 Degrees Abduction (Mirrored) - 2-3 x daily - 10 reps      Scaption with Resistance - 1 x daily - 2 sets - 10 reps      Standing Bilateral Low Shoulder Row with Anchored Resistance - 1 x daily - 2 sets - 20 reps      Push Up on Table - 1 x daily - 10-15 reps    Therapeutic Treatments and Modalities:     1. E Stim Unattended (CPT  12918), IFC and CP x 15min to L shoulder    Time-based treatments/modalities:    Physical Therapy Timed Treatment Charges  Therapeutic exercise minutes (CPT 37849): 45 minutes      Pain rating (1-10) before treatment:  0  Pain rating (1-10) after treatment:  1-2with end range positioning.     ASSESSMENT:   Response to treatment: Overall symptoms improving and observed movement and strength progressing. HEP progressed. Pt to f/u in one week with discussed changes to hep.     PLAN/RECOMMENDATIONS:   Plan for treatment: therapy treatment to continue next visit.  Planned interventions for next visit: continue with current treatment.

## 2022-07-06 ENCOUNTER — APPOINTMENT (OUTPATIENT)
Dept: MEDICAL GROUP | Facility: PHYSICIAN GROUP | Age: 37
End: 2022-07-06
Payer: COMMERCIAL

## 2022-07-07 ENCOUNTER — PHYSICAL THERAPY (OUTPATIENT)
Dept: PHYSICAL THERAPY | Facility: REHABILITATION | Age: 37
End: 2022-07-07
Payer: COMMERCIAL

## 2022-07-07 DIAGNOSIS — M75.42 IMPINGEMENT SYNDROME OF LEFT SHOULDER: ICD-10-CM

## 2022-07-07 PROCEDURE — 97110 THERAPEUTIC EXERCISES: CPT

## 2022-07-07 NOTE — OP THERAPY DAILY TREATMENT
Outpatient Physical Therapy  DAILY TREATMENT     [facility] [place]  [address]  [place] [address]  Phone:  [phone]  Fax:  [phone]    Date: 07/07/2022    Patient: Audrey Leyva  YOB: 1985  MRN: [id number]     Time Calculation    Start time: 0815  Stop time: 0900 Time Calculation (min): 45 minutes         Chief Complaint: L shoulder  Visit #: 6    SUBJECTIVE:  Pain is controlled during lifts/ transfers with positional modifications. Notes overall the same with minimal change since last week.  Cross body adduction past neutral bothersome.     OBJECTIVE:  Current objective measures:  ROM WNL without pain except for HA of shoulder past neutral.           Therapeutic Exercises (CPT 31781):     1. Seated pullleys, 3min     2. Rows,  TRX x 20    3. Shoulder extension, blk band x 25    4. Bilateral shoulder ER , flasher orange x 20    5. T band ER ins supported scaption, red 2x12     6. Standing scaption, HEP    7. Standing wall ball flexion, 1min    8. T band ir standing , blk band x 25    9. Standing ER stretch, 20 sec x 3    10. 80lbs towel slide, NT    11. 40# shrug, x 6    12. Bicep curls , purple band x 20    13. Pec flys , NT    14. Bicep curls, double blue standing x 12     15. Blue sc push/ single arm chest press, x 20       Therapeutic Exercise Summary: HEP instruction/performance and development. Handout provided and exercises located below:  Access Code: [email or web address]  URL: [email or web address]  Date: 06/29/2022  Prepared by: Carroll Miller    Exercises        Shoulder External Rotation and Scapular Retraction with Resistance - 1 x daily - 2 sets - 15-20 reps      Single Arm Doorway Pec Stretch at 90 Degrees Abduction (Mirrored) - 2-3 x daily - 10 reps      Scaption with Resistance - 1 x daily - 2 sets - 10 reps      Standing Bilateral Low Shoulder Row with Anchored Resistance - 1 x daily - 2 sets - 20 reps      Push Up on Table - 1 x daily -  10-15 reps      Time-based treatments/modalities:    Physical Therapy Timed Treatment Charges  Therapeutic exercise minutes (CPT 01884): 45 minutes      Pain rating (1-10) before treatment:  0  Pain rating (1-10) after treatment:  0 at rest    ASSESSMENT:   Response to treatment: Overall symptoms improving and observed movement and strength progressing. HEP progressed last session and to continue till next session. Symptoms consistent with anterior shoulder strain/ sprain.     PLAN/RECOMMENDATIONS:   Plan for treatment: therapy treatment to continue next visit.  Planned interventions for next visit: continue with current treatment.

## 2022-07-13 ENCOUNTER — PHYSICAL THERAPY (OUTPATIENT)
Dept: PHYSICAL THERAPY | Facility: REHABILITATION | Age: 37
End: 2022-07-13
Payer: COMMERCIAL

## 2022-07-13 DIAGNOSIS — M75.42 IMPINGEMENT SYNDROME OF LEFT SHOULDER: ICD-10-CM

## 2022-07-13 PROCEDURE — 97110 THERAPEUTIC EXERCISES: CPT

## 2022-07-13 NOTE — OP THERAPY DAILY TREATMENT
Outpatient Physical Therapy  DAILY TREATMENT     [facility] [place]  [address]  [place] [address]  Phone:  [phone]  Fax:  [phone]    Date: 07/13/2022    Patient: Audrey Leyva  YOB: 1985  MRN: [id number]     Time Calculation    Start time: 0352  Stop time: 0430 Time Calculation (min): 38 minutes         Chief Complaint: L shoulder  Visit #: 7    SUBJECTIVE:  Notes that her arms has been sore with laying on the left side but she is on vacation and the sharp/ more instant pain has not been as prevalent.      OBJECTIVE:  Current objective measures:  ROM WNL without pain except for HA of shoulder past neutral.           Therapeutic Exercises (CPT 09224):     1. Seated pullleys, 3min     2. UBE, 2min lvl 3    3. Shoulder extension, blk band x 25    4. LOw row with and without rotation towards pain, double blk band    5. Lower pec stretc    6. Ball bounce in field goal position, 1min on wall     7. Chest press, blue SC x 20     8. T band ir standing , blk band x 25    9. 40# shrug, x 6, NT    10. Pec flys , NT    11. Bicep curls, NT    12. Blue sc push/ single arm chest press, x 20       Therapeutic Exercise Summary: HEP instruction/performance and development. Handout provided and exercises located below:  Access Code: [email or web address]  URL: [email or web address]  Date: 07/13/2022  Prepared by: Carroll Miller    Exercises        Shoulder External Rotation and Scapular Retraction with Resistance - 1 x daily - 2 sets - 15-20 reps      Single Arm Doorway Pec Stretch at 90 Degrees Abduction (Mirrored) - 2-3 x daily - 10 reps      Scaption with Resistance - 1 x daily - 2 sets - 10 reps      Standing Bilateral Low Shoulder Row with Anchored Resistance - 1 x daily - 2 sets - 20 reps      Push Up on Table - 1 x daily - 10-15 reps      Wall Crown - 1 x daily - 2 sets - 10 reps      Time-based treatments/modalities:    Physical Therapy Timed Treatment  Charges  Therapeutic exercise minutes (CPT 62802): 35 minutes      Pain rating (1-10) before treatment:  0  Pain rating (1-10) after treatment:  0 at rest    ASSESSMENT:   Response to treatment: Overall symptoms improving and observed movement and strength progressing. HEP progressed and overall compliant with completion. Symptoms consistent with anterior shoulder strain/ sprain.      PLAN/RECOMMENDATIONS:   Plan for treatment: therapy treatment to continue next visit.  Planned interventions for next visit: continue with current treatment.

## 2022-07-19 ENCOUNTER — OFFICE VISIT (OUTPATIENT)
Dept: MEDICAL GROUP | Facility: PHYSICIAN GROUP | Age: 37
End: 2022-07-19
Payer: COMMERCIAL

## 2022-07-19 VITALS
RESPIRATION RATE: 18 BRPM | OXYGEN SATURATION: 98 % | TEMPERATURE: 98.2 F | BODY MASS INDEX: 42.11 KG/M2 | HEART RATE: 100 BPM | HEIGHT: 66 IN | WEIGHT: 262 LBS | SYSTOLIC BLOOD PRESSURE: 106 MMHG | DIASTOLIC BLOOD PRESSURE: 80 MMHG

## 2022-07-19 DIAGNOSIS — F33.2 SEVERE EPISODE OF RECURRENT MAJOR DEPRESSIVE DISORDER, WITHOUT PSYCHOTIC FEATURES (HCC): ICD-10-CM

## 2022-07-19 PROBLEM — F33.1 MODERATE EPISODE OF RECURRENT MAJOR DEPRESSIVE DISORDER (HCC): Status: ACTIVE | Noted: 2022-07-19

## 2022-07-19 PROCEDURE — 99214 OFFICE O/P EST MOD 30 MIN: CPT | Performed by: PHYSICIAN ASSISTANT

## 2022-07-19 PROCEDURE — 96127 BRIEF EMOTIONAL/BEHAV ASSMT: CPT | Performed by: PHYSICIAN ASSISTANT

## 2022-07-19 RX ORDER — SERTRALINE HYDROCHLORIDE 25 MG/1
25 TABLET, FILM COATED ORAL DAILY
Qty: 40 TABLET | Refills: 1 | Status: SHIPPED | OUTPATIENT
Start: 2022-07-19 | End: 2022-08-22 | Stop reason: DRUGHIGH

## 2022-07-19 ASSESSMENT — ANXIETY QUESTIONNAIRES
4. TROUBLE RELAXING: SEVERAL DAYS
3. WORRYING TOO MUCH ABOUT DIFFERENT THINGS: NEARLY EVERY DAY
1. FEELING NERVOUS, ANXIOUS, OR ON EDGE: SEVERAL DAYS
2. NOT BEING ABLE TO STOP OR CONTROL WORRYING: MORE THAN HALF THE DAYS
7. FEELING AFRAID AS IF SOMETHING AWFUL MIGHT HAPPEN: MORE THAN HALF THE DAYS
5. BEING SO RESTLESS THAT IT IS HARD TO SIT STILL: SEVERAL DAYS
GAD7 TOTAL SCORE: 12
6. BECOMING EASILY ANNOYED OR IRRITABLE: MORE THAN HALF THE DAYS

## 2022-07-19 ASSESSMENT — PATIENT HEALTH QUESTIONNAIRE - PHQ9
SUM OF ALL RESPONSES TO PHQ QUESTIONS 1-9: 24
CLINICAL INTERPRETATION OF PHQ2 SCORE: 6
5. POOR APPETITE OR OVEREATING: 3 - NEARLY EVERY DAY

## 2022-07-19 ASSESSMENT — FIBROSIS 4 INDEX: FIB4 SCORE: 0.25

## 2022-07-19 NOTE — ASSESSMENT & PLAN NOTE
Patient is here today to discuss depression and anxiety.  She also would like to be screened for ADHD.  She has never been on medication in the past for depression or anxiety. Has slowly worsened over the last few years and the last few months. Her mother lives her at this time which makes it worse. Tried Zoloft in the past but it made things worse (was on it less than a month). 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 Also has tangential thinking during conversations. Does not complete tasks.

## 2022-07-20 ENCOUNTER — PHYSICAL THERAPY (OUTPATIENT)
Dept: PHYSICAL THERAPY | Facility: REHABILITATION | Age: 37
End: 2022-07-20
Payer: COMMERCIAL

## 2022-07-20 DIAGNOSIS — M75.42 IMPINGEMENT SYNDROME OF LEFT SHOULDER: ICD-10-CM

## 2022-07-20 PROCEDURE — 97014 ELECTRIC STIMULATION THERAPY: CPT

## 2022-07-20 PROCEDURE — 97110 THERAPEUTIC EXERCISES: CPT

## 2022-07-20 NOTE — OP THERAPY DAILY TREATMENT
"  Outpatient Physical Therapy  DAILY TREATMENT     [facility] [place]  [address]  [place] [address]  Phone:  [phone]  Fax:  [phone]    Date: 07/20/2022    Patient: Audrey Leyva  YOB: 1985  MRN: [id number]     Time Calculation    Start time: 1500  Stop time: 1553 Time Calculation (min): 53 minutes         Chief Complaint: Shoulder Injury    Visit #: 8    SUBJECTIVE:  Patient reports that overall pain has continued to decrease. Continues to have pain with sleeping on L side, ROM into cross body adduction. 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 She has been off work for 2 weeks on vacation, so pain has decreased.     OBJECTIVE:  Current objective measures:           Therapeutic Exercises (CPT 40435):     1. Seated pullleys, Flexion, abduction, IR x 2 minutes each , Added IR stretch with towel behind back to HEP     3. Shoulder horizontal abduction, 2 x 20, Pink TB -> green TB     4. Overhead press , 2 x 15, 3# DB    5. Bench press , 2 x 15, 5# DB -> 7# DB     6. Table push ups , 2 x 10     7. Plank on knees , 2 x 30\"     8. Quadruped with UEs on Ball side of BOSU, alt. shoulder taps, 2 x 10     9. 75# towel slide, x 3 with modified movement (pulling with arms) x 3 with \"typical\" strategy, Typical strategy continues to result in 1/10 L shoulder pain when \"boosting\" to the left.       Therapeutic Exercise Summary: HEP instruction/performance and development. Handout provided and exercises located below:  Access Code: [email or web address]  URL: [email or web address]  Date: 06/29/2022  Prepared by: Carroll Miller    Exercises        Shoulder External Rotation and Scapular Retraction with Resistance - 1 x daily - 2 sets - 15-20 reps      Single Arm Doorway Pec Stretch at 90 Degrees Abduction (Mirrored) - 2-3 x daily - 10 reps      Scaption with Resistance " - 1 x daily - 2 sets - 10 reps      Standing Bilateral Low Shoulder Row with Anchored Resistance - 1 x daily - 2 sets - 20 reps      Push Up on Table - 1 x daily - 10-15 reps    Therapeutic Treatments and Modalities:     1. E Stim Unattended (CPT 88273), IFC and CP x 15min to L shoulder in sitting     Time-based treatments/modalities:    Physical Therapy Timed Treatment Charges  Therapeutic exercise minutes (CPT 46734): 38 minutes      ASSESSMENT:   Response to treatment: Emphasis on pushing/shoulder stabilizing muscle work today. Tolerated well without significant increase in pain during session. However, reported shoulder fatigue at end of session. Will follow up on tolerance to increased activity next session. Will also complete progress note next visit.     PLAN/RECOMMENDATIONS:   Plan for treatment: therapy treatment to continue next visit. Progress note to be completed next visit.   Planned interventions for next visit: continue with current treatment.

## 2022-08-01 ENCOUNTER — APPOINTMENT (OUTPATIENT)
Dept: PHYSICAL THERAPY | Facility: REHABILITATION | Age: 37
End: 2022-08-01
Payer: COMMERCIAL

## 2022-08-08 ENCOUNTER — PHYSICAL THERAPY (OUTPATIENT)
Dept: PHYSICAL THERAPY | Facility: REHABILITATION | Age: 37
End: 2022-08-08
Payer: COMMERCIAL

## 2022-08-08 DIAGNOSIS — M75.42 IMPINGEMENT SYNDROME OF LEFT SHOULDER: ICD-10-CM

## 2022-08-08 PROCEDURE — 97110 THERAPEUTIC EXERCISES: CPT

## 2022-08-08 NOTE — OP THERAPY DAILY TREATMENT
Outpatient Physical Therapy  DAILY TREATMENT     [facility] [place]  [address], [address]  [place] [address]  Phone:  [phone]  Fax:  [phone]    Date: 08/08/2022    Patient: Audrey Leyva  YOB: 1985  MRN: [id number]     Time Calculation    Start time: 1120  Stop time: 1145 Time Calculation (min): 25 minutes         Chief Complaint: Left shoulder problem  Visit #: 9    SUBJECTIVE:  No reports of discomfort or issues with pain or function as of late.     OBJECTIVE:  Current objective measures:   PT Functional Assessment Tool Used: SPADI, DASH  PT Functional Assessment Score: SPADI; 2/130, DASH: 0%       Therapeutic Exercises (CPT 45811):     1. Seated pullleys, Flexion, abduction, IR x 2 minutes each , Added IR stretch with towel behind back to HEP     2. Single arm row, 50# x 20    3. Shoulder horizontal abduction, 2 x 20, Pink TB -> green TB for HEP    4. Overhead press , 2 x 15, 5# DB    5. Table push ups , 2 x 10 , lower table      Therapeutic Exercise Summary: HEP instruction/performance and development. Handout provided and exercises located below:  Access Code: [email or web address]  URL: [email or web address]  Date: 06/29/2022  Prepared by: Carroll Miller    Exercises        Shoulder External Rotation and Scapular Retraction with Resistance - 1 x daily - 2 sets - 15-20 reps      Single Arm Doorway Pec Stretch at 90 Degrees Abduction (Mirrored) - 2-3 x daily - 10 reps      Scaption with Resistance - 1 x daily - 2 sets - 10 reps      Standing Bilateral Low Shoulder Row with Anchored Resistance - 1 x daily - 2 sets - 20 reps      Push Up on Table - 1 x daily - 10-15 reps      Time-based treatments/modalities:    Physical Therapy Timed Treatment Charges  Therapeutic exercise minutes (CPT 98282): 25 minutes      ASSESSMENT:   Response to treatment: Pt to be promoted to independence and discharge within 30 days. Overall goals met.     PLAN/RECOMMENDATIONS:   Plan for  treatment: D/C within 30 days. .

## 2022-08-15 ENCOUNTER — APPOINTMENT (OUTPATIENT)
Dept: PHYSICAL THERAPY | Facility: REHABILITATION | Age: 37
End: 2022-08-15
Payer: COMMERCIAL

## 2022-08-22 ENCOUNTER — OFFICE VISIT (OUTPATIENT)
Dept: MEDICAL GROUP | Facility: PHYSICIAN GROUP | Age: 37
End: 2022-08-22
Payer: COMMERCIAL

## 2022-08-22 ENCOUNTER — APPOINTMENT (OUTPATIENT)
Dept: PHYSICAL THERAPY | Facility: REHABILITATION | Age: 37
End: 2022-08-22
Payer: COMMERCIAL

## 2022-08-22 VITALS
WEIGHT: 255 LBS | HEIGHT: 66 IN | OXYGEN SATURATION: 96 % | BODY MASS INDEX: 40.98 KG/M2 | TEMPERATURE: 97 F | DIASTOLIC BLOOD PRESSURE: 74 MMHG | RESPIRATION RATE: 18 BRPM | SYSTOLIC BLOOD PRESSURE: 102 MMHG | HEART RATE: 92 BPM

## 2022-08-22 DIAGNOSIS — F33.1 MODERATE EPISODE OF RECURRENT MAJOR DEPRESSIVE DISORDER (HCC): ICD-10-CM

## 2022-08-22 PROCEDURE — 96127 BRIEF EMOTIONAL/BEHAV ASSMT: CPT | Performed by: PHYSICIAN ASSISTANT

## 2022-08-22 PROCEDURE — 99214 OFFICE O/P EST MOD 30 MIN: CPT | Performed by: PHYSICIAN ASSISTANT

## 2022-08-22 ASSESSMENT — PATIENT HEALTH QUESTIONNAIRE - PHQ9
SUM OF ALL RESPONSES TO PHQ QUESTIONS 1-9: 10
5. POOR APPETITE OR OVEREATING: 2 - MORE THAN HALF THE DAYS
CLINICAL INTERPRETATION OF PHQ2 SCORE: 2

## 2022-08-22 ASSESSMENT — ANXIETY QUESTIONNAIRES
2. NOT BEING ABLE TO STOP OR CONTROL WORRYING: SEVERAL DAYS
3. WORRYING TOO MUCH ABOUT DIFFERENT THINGS: MORE THAN HALF THE DAYS
GAD7 TOTAL SCORE: 8
7. FEELING AFRAID AS IF SOMETHING AWFUL MIGHT HAPPEN: SEVERAL DAYS
4. TROUBLE RELAXING: SEVERAL DAYS
1. FEELING NERVOUS, ANXIOUS, OR ON EDGE: SEVERAL DAYS
5. BEING SO RESTLESS THAT IT IS HARD TO SIT STILL: SEVERAL DAYS
6. BECOMING EASILY ANNOYED OR IRRITABLE: SEVERAL DAYS

## 2022-08-22 ASSESSMENT — FIBROSIS 4 INDEX: FIB4 SCORE: 0.25

## 2022-08-22 NOTE — PROGRESS NOTES
"Subjective:     CC: f/u depression and anxiety     HPI:   Audrey presents today for follow up on depression. Was started on zoloft a month ago and states she more energy.  Feels like she is able to get more tasks done around the house. Still having some trouble sleeping and has also noticed a decrease in appetite.     History reviewed. No pertinent past medical history.    Social History     Tobacco Use    Smoking status: Former     Types: Cigarettes     Quit date: 12/13/2018     Years since quitting: 3.6    Smokeless tobacco: Never   Vaping Use    Vaping Use: Never used   Substance Use Topics    Alcohol use: Not Currently    Drug use: No       Current Outpatient Medications Ordered in Epic   Medication Sig Dispense Refill    sertraline (ZOLOFT) 50 MG Tab Take 1 Tablet by mouth every day. 30 Tablet 1    Fexofenadine HCl (ALLEGRA PO) Take  by mouth.      ibuprofen (MOTRIN) 600 MG Tab Take 1 Tab by mouth every 6 hours as needed (For cramping after delivery; do not give if patient is receiving ketorolac (Toradol)). 30 Tab 1     No current Epic-ordered facility-administered medications on file.       Allergies:  Patient has no known allergies.    Health Maintenance: Completed    ROS:  Gen: no fevers/chills  Eyes: no changes in vision  ENT: no sore throat  Pulm: no sob, no cough  CV: no chest pain  GI: no nausea/vomiting  : no dysuria  MSk: no myalgias  Skin: no rash  Neuro: no headaches  Psych: Positive for depression and anxiety    Objective:       Exam:  /74   Pulse 92   Temp 36.1 °C (97 °F) (Temporal)   Resp 18   Ht 1.676 m (5' 6\")   Wt 116 kg (255 lb)   SpO2 96%   BMI 41.16 kg/m²  Body mass index is 41.16 kg/m².    Gen: Alert and oriented, No apparent distress.  Skin: Warm, dry, good turgor, no rashes in visible areas.  HEENT: Normocephalic. Eyes conjunctiva clear lids without ptosis, pupils equal and reactive to light accommodation, ears normal shape and contour  Neck: Trachea midline, no masses, no " thyromegaly  Lungs: Normal effort, CTA bilaterally, no wheezes, rhonchi, or rales  CV: Regular rate and rhythm. No murmurs, rubs, or gallops.  MSK: Normal gait, moves all extremities.  Neuro: Grossly non-focal.  Ext: No clubbing, cyanosis, edema.  Psych: Alert and oriented x3, normal affect and mood.       Depression Screening    Little interest or pleasure in doing things?  1 - several days   Feeling down, depressed , or hopeless? 1 - several days   Trouble falling or staying asleep, or sleeping too much?  1 - several days   Feeling tired or having little energy?  1 - several days   Poor appetite or overeating?  2 - more than half the days   Feeling bad about yourself - or that you are a failure or have let yourself or your family down? 1 - several days   Trouble concentrating on things, such as reading the newspaper or watching television? 2 - more than half the days   Moving or speaking so slowly that other people could have noticed.  Or the opposite - being so fidgety or restless that you have been moving around a lot more than usual?  1 - several days   Thoughts that you would be better off dead, or of hurting yourself?  0 - not at all   Patient Health Questionnaire Score: 10     If depressive symptoms identified deferred to follow up visit unless specifically addressed in assesment and plan.    Interpretation of PHQ-9 Total Score   Score Severity   1-4 No Depression   5-9 Mild Depression   10-14 Moderate Depression   15-19 Moderately Severe Depression   20-27 Severe Depression  LORNE-7 Questionnaire    Feeling nervous, anxious, or on edge: Several days  Not being able to sop or control worrying: Several days  Worrying too much about different things: More than half the days  Trouble relaxing: Several days  Being so restless that it's hard to sit still: Several days  Becoming easily annoyed or irritable: Several days  Feeling afraid as if something awful might happen: Several days  Total: 8    Interpretation of  LORNE 7 Total Score   Score Severity :  0-4 No Anxiety   5-9 Mild Anxiety  10-14 Moderate Anxiety  15-21 Severe Anxiety      Assessment & Plan:     37 y.o. female with the following -     1. Moderate episode of recurrent major depressive disorder (HCC)  Patient's PHQ-9 and Gad7 scores have improved since previous visit.  She states that she likes being on the Zoloft and has minimal side effects.  She would like to increase the dose of the sertraline to 50 mg.  She also has lost 7 pounds since her previous visit.  We did discuss some of the insomnia she has been experiencing, however patient would like to hold off on taking any other medications and just plan on increasing the dose of sertraline first.  Did discuss she can try melatonin or over-the-counter sleep aid if necessary.  Not interested in prescription medication at this time.  We will have her follow-up in a month for reevaluation.  Discussed that if she is feeling suicidal needs to call 911 or go to the emergency room.  Denies SI today.    - sertraline (ZOLOFT) 50 MG Tab; Take 1 Tablet by mouth every day.  Dispense: 30 Tablet; Refill: 1    I spent a total of 31 minutes with record review (including external notes and labs), exam, communication with the patient, communication with other providers, and documentation of this encounter.     Return in about 4 weeks (around 9/19/2022) for follow up on meds.    Please note that this dictation was created using voice recognition software. I have made every reasonable attempt to correct obvious errors, but I expect that there are errors of grammar and possibly content that I did not discover before finalizing the note.    Electronically signed by Jackelyn Fernandez PA-C on August 22, 2022

## 2022-08-29 ENCOUNTER — APPOINTMENT (OUTPATIENT)
Dept: PHYSICAL THERAPY | Facility: REHABILITATION | Age: 37
End: 2022-08-29
Payer: COMMERCIAL

## 2022-09-06 ENCOUNTER — APPOINTMENT (OUTPATIENT)
Dept: PHYSICAL THERAPY | Facility: REHABILITATION | Age: 37
End: 2022-09-06
Payer: COMMERCIAL

## 2022-09-12 ENCOUNTER — APPOINTMENT (OUTPATIENT)
Dept: PHYSICAL THERAPY | Facility: REHABILITATION | Age: 37
End: 2022-09-12
Payer: COMMERCIAL

## 2022-09-13 ENCOUNTER — TELEPHONE (OUTPATIENT)
Dept: PHYSICAL THERAPY | Facility: REHABILITATION | Age: 37
End: 2022-09-13
Payer: COMMERCIAL

## 2022-09-13 NOTE — OP THERAPY DISCHARGE SUMMARY
Outpatient Physical Therapy  DISCHARGE SUMMARY NOTE      [facility] [place]  [address]  [place] [address]  Phone:  [phone]  Fax:  [phone]    Date of Visit: 09/13/2022    Patient: Audrey Leyva  YOB: 1985  MRN: [id number]     Referring Provider: SISSY Holland   Referring Diagnosis Impingement syndrome of left shoulder [M75.42]         Functional Assessment Used    PT Functional Assessment Tool Used: SPADI, DASH  PT Functional Assessment Score: SPADI; 2/130, DASH: 0%     Your patient is being discharged from Physical Therapy with the following comments:   Goals met    Comments:  Audrey Leyva has completed 9 physical therapy sessions on her current prescription. She has improved function, decreased pain, improved strength, improved ROM, and she continues to progress with her home exercise program. Recommend to discharge patient to full independent home exercise program at this time. Thank you for the opportunity to assist you and your patient.         Limitations Remaining:  none    Recommendations:  Follow up with PCP as needed    Carroll Miller, PT, DPT    Date: 9/13/2022

## 2022-09-19 ENCOUNTER — APPOINTMENT (OUTPATIENT)
Dept: PHYSICAL THERAPY | Facility: REHABILITATION | Age: 37
End: 2022-09-19
Payer: COMMERCIAL

## 2022-09-26 ENCOUNTER — APPOINTMENT (OUTPATIENT)
Dept: PHYSICAL THERAPY | Facility: REHABILITATION | Age: 37
End: 2022-09-26
Payer: COMMERCIAL

## 2022-09-28 ENCOUNTER — OFFICE VISIT (OUTPATIENT)
Dept: MEDICAL GROUP | Facility: PHYSICIAN GROUP | Age: 37
End: 2022-09-28
Payer: COMMERCIAL

## 2022-09-28 VITALS
WEIGHT: 257 LBS | HEART RATE: 103 BPM | DIASTOLIC BLOOD PRESSURE: 70 MMHG | OXYGEN SATURATION: 95 % | TEMPERATURE: 98.1 F | RESPIRATION RATE: 18 BRPM | SYSTOLIC BLOOD PRESSURE: 108 MMHG | BODY MASS INDEX: 41.3 KG/M2 | HEIGHT: 66 IN

## 2022-09-28 DIAGNOSIS — Z23 NEED FOR VACCINATION: ICD-10-CM

## 2022-09-28 DIAGNOSIS — F33.1 MODERATE EPISODE OF RECURRENT MAJOR DEPRESSIVE DISORDER (HCC): ICD-10-CM

## 2022-09-28 PROCEDURE — 99213 OFFICE O/P EST LOW 20 MIN: CPT | Mod: 25 | Performed by: PHYSICIAN ASSISTANT

## 2022-09-28 PROCEDURE — 90471 IMMUNIZATION ADMIN: CPT | Performed by: PHYSICIAN ASSISTANT

## 2022-09-28 PROCEDURE — 96127 BRIEF EMOTIONAL/BEHAV ASSMT: CPT | Performed by: PHYSICIAN ASSISTANT

## 2022-09-28 PROCEDURE — 90686 IIV4 VACC NO PRSV 0.5 ML IM: CPT | Performed by: PHYSICIAN ASSISTANT

## 2022-09-28 RX ORDER — SERTRALINE HYDROCHLORIDE 25 MG/1
25 TABLET, FILM COATED ORAL DAILY
Qty: 90 TABLET | Refills: 2 | Status: SHIPPED | OUTPATIENT
Start: 2022-09-28 | End: 2022-11-21

## 2022-09-28 ASSESSMENT — ANXIETY QUESTIONNAIRES
5. BEING SO RESTLESS THAT IT IS HARD TO SIT STILL: NOT AT ALL
4. TROUBLE RELAXING: NOT AT ALL
7. FEELING AFRAID AS IF SOMETHING AWFUL MIGHT HAPPEN: NOT AT ALL
GAD7 TOTAL SCORE: 1
1. FEELING NERVOUS, ANXIOUS, OR ON EDGE: NOT AT ALL
3. WORRYING TOO MUCH ABOUT DIFFERENT THINGS: SEVERAL DAYS
2. NOT BEING ABLE TO STOP OR CONTROL WORRYING: NOT AT ALL
6. BECOMING EASILY ANNOYED OR IRRITABLE: NOT AT ALL

## 2022-09-28 ASSESSMENT — PATIENT HEALTH QUESTIONNAIRE - PHQ9
SUM OF ALL RESPONSES TO PHQ QUESTIONS 1-9: 8
CLINICAL INTERPRETATION OF PHQ2 SCORE: 1
5. POOR APPETITE OR OVEREATING: 2 - MORE THAN HALF THE DAYS

## 2022-09-28 ASSESSMENT — FIBROSIS 4 INDEX: FIB4 SCORE: 0.25

## 2022-09-28 NOTE — PROGRESS NOTES
"Subjective:     CC: Depression follow-up    HPI:   Audrey presents today for follow-up on her depression.  Patient was evaluated a month ago and dose of sertraline was increased to 50 mg.  States that she is feeling \"okay\" on this dose but she might feel a little bit more tired than usual    History reviewed. No pertinent past medical history.    Social History     Tobacco Use    Smoking status: Former     Types: Cigarettes     Quit date: 12/13/2018     Years since quitting: 3.7    Smokeless tobacco: Never   Vaping Use    Vaping Use: Never used   Substance Use Topics    Alcohol use: Not Currently    Drug use: No       Current Outpatient Medications Ordered in Epic   Medication Sig Dispense Refill    sertraline (ZOLOFT) 25 MG tablet Take 1 Tablet by mouth every day. 90 Tablet 2    Fexofenadine HCl (ALLEGRA PO) Take  by mouth.      ibuprofen (MOTRIN) 600 MG Tab Take 1 Tab by mouth every 6 hours as needed (For cramping after delivery; do not give if patient is receiving ketorolac (Toradol)). 30 Tab 1     No current Epic-ordered facility-administered medications on file.       Allergies:  Patient has no known allergies.    Health Maintenance: Completed    ROS:  Gen: no fevers/chills  Eyes: no changes in vision  ENT: no sore throat  Pulm: no sob, no cough  CV: no chest pain  GI: no nausea/vomiting  : no dysuria  MSk: no myalgias  Skin: no rash  Neuro: no headaches  Psych: Positive for depression      Objective:       Exam:  /70   Pulse (!) 103   Temp 36.7 °C (98.1 °F) (Temporal)   Resp 18   Ht 1.676 m (5' 6\")   Wt 117 kg (257 lb)   SpO2 95%   BMI 41.48 kg/m²  Body mass index is 41.48 kg/m².    Constitutional: Alert, no distress, well-groomed.  Skin: Warm, dry, good turgor, no rashes in visible areas.  Eye: Equal, round and reactive, conjunctiva clear, lids normal.  ENMT: Lips without lesions, good dentition, moist mucous membranes.  Neck: Trachea midline, no masses, no thyromegaly.  Respiratory: Unlabored " respiratory effort, no cough.  MSK: Normal gait, moves all extremities.  Neuro: Grossly non-focal.   Psych: Alert and oriented x3, normal affect and mood.    Depression Screening    Little interest or pleasure in doing things?  1 - several days   Feeling down, depressed , or hopeless? 0 - not at all   Trouble falling or staying asleep, or sleeping too much?  2 - more than half the days   Feeling tired or having little energy?  2 - more than half the days   Poor appetite or overeating?  2 - more than half the days   Feeling bad about yourself - or that you are a failure or have let yourself or your family down? 0 - not at all   Trouble concentrating on things, such as reading the newspaper or watching television? 1 - several days   Moving or speaking so slowly that other people could have noticed.  Or the opposite - being so fidgety or restless that you have been moving around a lot more than usual?  0 - not at all   Thoughts that you would be better off dead, or of hurting yourself?  0 - not at all   Patient Health Questionnaire Score: 8       If depressive symptoms identified deferred to follow up visit unless specifically addressed in assesment and plan.    Interpretation of PHQ-9 Total Score   Score Severity   1-4 No Depression   5-9 Mild Depression   10-14 Moderate Depression   15-19 Moderately Severe Depression   20-27 Severe Depression    LORNE-7 Questionnaire    Feeling nervous, anxious, or on edge: Not at all  Not being able to sop or control worrying: Not at all  Worrying too much about different things: Several days  Trouble relaxing: Not at all  Being so restless that it's hard to sit still: Not at all  Becoming easily annoyed or irritable: Not at all  Feeling afraid as if something awful might happen: Not at all  Total: 1    Interpretation of LORNE 7 Total Score   Score Severity :  0-4 No Anxiety   5-9 Mild Anxiety  10-14 Moderate Anxiety  15-21 Severe Anxiety      Assessment & Plan:     37 y.o. female with  the following -     1. Moderate episode of recurrent major depressive disorder (HCC)  Chronic.  Did discuss that the patient's PHQ-9 and LORNE-7 scores have somewhat improved, however she states that on the increased dose of sertraline she is feeling more tired than usual.  Patient would like to go back now down to 25 mg dose.  Would like her to continue with this dose for the next 3 to 6 months and then follow-up for reevaluation or sooner if needed.  Patient denies SI.  - sertraline (ZOLOFT) 25 MG tablet; Take 1 Tablet by mouth every day.  Dispense: 90 Tablet; Refill: 2    2. Need for vaccination  - INFLUENZA VACCINE QUAD INJ (PF)    I spent a total of 25 minutes with record review (including external notes and labs), exam, communication with the patient, communication with other providers, and documentation of this encounter.     Return in about 3 months (around 12/28/2022) for follow up on meds.    Please note that this dictation was created using voice recognition software. I have made every reasonable attempt to correct obvious errors, but I expect that there are errors of grammar and possibly content that I did not discover before finalizing the note.    Electronically signed by Jackelyn Fernandez PA-C on September 28, 2022

## 2022-10-18 ENCOUNTER — OFFICE VISIT (OUTPATIENT)
Dept: MEDICAL GROUP | Facility: PHYSICIAN GROUP | Age: 37
End: 2022-10-18
Payer: COMMERCIAL

## 2022-10-18 VITALS
SYSTOLIC BLOOD PRESSURE: 100 MMHG | WEIGHT: 256.5 LBS | TEMPERATURE: 97.5 F | HEART RATE: 88 BPM | BODY MASS INDEX: 41.22 KG/M2 | OXYGEN SATURATION: 97 % | HEIGHT: 66 IN | RESPIRATION RATE: 16 BRPM | DIASTOLIC BLOOD PRESSURE: 70 MMHG

## 2022-10-18 DIAGNOSIS — R00.2 PALPITATIONS: ICD-10-CM

## 2022-10-18 PROCEDURE — 99213 OFFICE O/P EST LOW 20 MIN: CPT

## 2022-10-18 ASSESSMENT — FIBROSIS 4 INDEX: FIB4 SCORE: 0.25

## 2022-10-18 NOTE — ASSESSMENT & PLAN NOTE
Patient reports that she has been getting a recurrence of her heart palpitations, except they have been stronger than previously.  She reports that they are starting to increase in frequency.  She will feel hard thump and then sometimes will get dizzy or blurred vision.  She continues to work night shift but has reduced her caffeine intake, staying well-hydrated

## 2022-10-18 NOTE — PROGRESS NOTES
"Yes CC:   Chief Complaint   Patient presents with    Heart Problem     palpitations        HISTORY OF PRESENT ILLNESS: Patient is a 37 y.o. female established patient w yes thank you ho presents today to discuss the following problems below:     Palpitations  Patient reports that she has been getting a recurrence of her heart palpitations, except they have been stronger than previously.  She reports that they are starting to increase in frequency.  She will feel hard thump and then sometimes will get dizzy or blurred vision.  She continues to work night shift but has reduced her caffeine intake, staying well-hydrated    No past medical history on file.    Allergies:Patient has no known allergies.    Review of Systems: Otherwise negative except for as stated above.      Exam: /70   Pulse 88   Temp 36.4 °C (97.5 °F) (Temporal)   Resp 16   Ht 1.676 m (5' 6\")   Wt 116 kg (256 lb 8 oz)   SpO2 97%  Body mass index is 41.4 kg/m².    Gen: Alert and oriented x4. Well developed, well-nourished female in no apparent distress.  Skin: Warm, dry, good turgor, no rashes in visible areas or lacerations appreciated.   Eye: EOM intact, pupils equal, round and reactive, conjunctiva clear, lids normal.  Neck: Trachea midline, no masses, no thyromegaly  MSK: Normal gait, moves all extremities.  Neuro: Alert and oriented x 4, non-focal exam with motor and sensory grossly intact.  Ext: No clubbing, cyanosis, edema.  Psych: Normal behavior, affect and mood.      Assessment/Plan:  37 y.o. female with the following -    1. Palpitations  Chronic problem, returning, not at goal.  Patient is requesting a longer Holter monitor for 14 days to further evaluate her recurrence of palpitations that are somewhat different than prior.  Order placed today.  Follow-up after to discuss results.  - Cardiac Event Monitor; Future       Follow-up: Return in about 4 weeks (around 11/15/2022).    Health Maintenance: Completed    My total time spent " caring for the patient on the day of the encounter was 31 minutes. This time was spent on record review, exam, communication with the patient, and documentation of this encounter.  This does not include time spent on separately billable procedures/tests.    Please note that this dictation was created using voice recognition software. I have made every reasonable attempt to correct obvious errors, but I expect that there are errors of grammar and possibly content that I did not discover before finalizing the note.    Electronically signed by JL Shell on October 18, 2022

## 2022-10-27 ENCOUNTER — NON-PROVIDER VISIT (OUTPATIENT)
Dept: CARDIOLOGY | Facility: MEDICAL CENTER | Age: 37
End: 2022-10-27
Payer: COMMERCIAL

## 2022-10-27 DIAGNOSIS — R00.2 PALPITATIONS: ICD-10-CM

## 2022-10-27 DIAGNOSIS — R00.0 SINUS TACHYCARDIA: ICD-10-CM

## 2022-10-27 DIAGNOSIS — I49.3 PVCS (PREMATURE VENTRICULAR CONTRACTIONS): ICD-10-CM

## 2022-10-27 DIAGNOSIS — I49.1 APC (ATRIAL PREMATURE CONTRACTIONS): ICD-10-CM

## 2022-10-27 NOTE — PROGRESS NOTES
Patient enrolled in the 14 day ePatch Holter monitoring program per [facility] Shell.  >Office hook-up, serial # [id number].  >EOS scanned to ADD AL's nurse on 11/29/22..

## 2022-10-31 DIAGNOSIS — R73.03 PREDIABETES: ICD-10-CM

## 2022-10-31 DIAGNOSIS — E55.9 VITAMIN D DEFICIENCY: ICD-10-CM

## 2022-10-31 DIAGNOSIS — R53.83 OTHER FATIGUE: ICD-10-CM

## 2022-10-31 DIAGNOSIS — R79.82 CRP ELEVATED: ICD-10-CM

## 2022-11-07 ENCOUNTER — APPOINTMENT (OUTPATIENT)
Dept: MEDICAL GROUP | Facility: PHYSICIAN GROUP | Age: 37
End: 2022-11-07
Payer: COMMERCIAL

## 2022-11-08 ENCOUNTER — APPOINTMENT (OUTPATIENT)
Dept: MEDICAL GROUP | Facility: PHYSICIAN GROUP | Age: 37
End: 2022-11-08
Payer: COMMERCIAL

## 2022-11-10 NOTE — PROGRESS NOTES
Medicare Cap   [x] Physical Therapy  [] Speech Therapy  [] Occupational therapy  *PT and Speech caps combine      $2150 Limit for PT and Speech combined  $2150 Limit for OT individually  At the beginning of the month where you expect to go over $2150, please add the [address] [place] [address] modifier      Patient Name: Steff Lisa: 1941    Note:  This is an estimate of charges billed.      Date of [address] Name # units/ charge $$$ charge Daily Total Charge Ongoing Total $$$   9/23 PT eval  Vaso  Therex 1+1+1 97.02+22.29+11.48 130.79 130.79   9/27 PTA 2TE, 1 VASO 2+1 25.10+19.62+7.91 52.63 183.42   9/30 PTA TE, Vaso 3+1 25.10+19.62*2+7.91 72.25 255.67   10/3 PT Therex, Vaso 3+1 28.50+22.29+22.29+8.99 82.07 337.74   10/5 PT \" \" \" \"    10/7 PTA  TE, Vaso 3+1  72.25    10/12 PTA TE, vaso 3+1  72.25    10/13 PTA TE, vaso 3+1  72.25 636.56   10/18 PTA TE, vaso 3+1  72.25 708.81   10/21 Therex (PT)  Vaso 3+1  82.07 790.88   10/25-PTA TE, Vaso  3+1  64.34 855.22   10/27-PTA  TE, Vaso  3+1 25.10+19.62+19.62 64.34 919.56   10/31-PTA  TE, Vaso 3+1  64.34 983.90   11/2- PT Therex  Vaso 3+1  72.25 1056.15   11/8 PTA TE, VAso 3+1  64.34 1120.49   11/10 Therex 3  73.08 1193.57 Subjective:     CC: Depression    HPI:   Audrey presents today with     Moderate episode of recurrent major depressive disorder (HCC)  Patient is here today to discuss depression and anxiety.  She also would like to be screened for ADHD.  She has never been on medication in the past for depression or anxiety. Has slowly worsened over the last few years and the last few months. Her mother lives her at this time which makes it worse. Tried Zoloft in the past but it made things worse (was on it less than a month). 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 Also has tangential thinking during conversations. Does not complete tasks.  Denies SI      History reviewed. No pertinent past medical history.    Social History     Tobacco Use   • Smoking status: Former Smoker     Quit date: 12/13/2018     Years since quitting: 3.6   • Smokeless tobacco: Never Used   Vaping Use   • Vaping Use: Never used   Substance Use Topics   • Alcohol use: Not Currently   • Drug use: No       Current Outpatient Medications Ordered in Epic   Medication Sig Dispense Refill   • Fexofenadine HCl (ALLEGRA PO) Take  by mouth.     • sertraline (ZOLOFT) 25 MG tablet Take 1 Tablet by mouth every day. 40 Tablet 1   • ibuprofen (MOTRIN) 600 MG Tab Take 1 Tab by mouth every 6 hours as needed (For cramping after delivery; do not give if patient is receiving ketorolac (Toradol)). 30 Tab 1   • meloxicam (MOBIC) 15 MG tablet Take 1 Tablet by mouth every day. (Patient not taking: Reported on 7/19/2022) 30 Tablet 0   • clotrimazole-betamethasone (LOTRISONE) 1-0.05 % Cream Apply 1 Application topically 2 times a day. (Patient not taking: No sig reported) 45 g 1   • COLLAGEN PO Take  by mouth. (Patient not taking: Reported on 7/19/2022)       No current [facility]-ordered facility-administered medications on file.       Allergies:  Patient has no known allergies.    Health Maintenance: Completed    ROS:  Gen: no fevers/chills  Eyes: no changes in  "vision  ENT: no sore throat  Pulm: no sob, no cough  CV: no chest pain  GI: no nausea/vomiting  : no dysuria  MSk: no myalgias  Skin: no rash  Neuro: no headaches  Psych: Positive for depression      Objective:       Exam:  /80   Pulse 100   Temp 36.8 °C (98.2 °F) (Temporal)   Resp 18   Ht 1.676 m (5' 6\")   Wt 119 kg (262 lb)   SpO2 98%   BMI 42.29 kg/m²  Body mass index is 42.29 kg/m².    Constitutional: Alert, no distress, well-groomed.  Skin: Warm, dry, good turgor, no rashes in visible areas.  Eye: Equal, round and reactive, conjunctiva clear, lids normal.  Neck: Trachea midline, no masses, no thyromegaly.  Respiratory: Unlabored respiratory effort, no cough.  MSK: Normal gait, moves all extremities.  Neuro: Grossly non-focal.   Psych: Alert and oriented x3, normal affect and mood.    Depression Screening    Little interest or pleasure in doing things?  3 - nearly every day   Feeling down, depressed , or hopeless? 3 - nearly every day   Trouble falling or staying asleep, or sleeping too much?  3 - nearly every day   Feeling tired or having little energy?  3 - nearly every day   Poor appetite or overeating?  3 - nearly every day   Feeling bad about yourself - or that you are a failure or have let yourself or your family down? 3 - nearly every day   Trouble concentrating on things, such as reading the newspaper or watching television? 3 - nearly every day   Moving or speaking so slowly that other people could have noticed.  Or the opposite - being so fidgety or restless that you have been moving around a lot more than usual?  3 - nearly every day   Thoughts that you would be better off dead, or of hurting yourself?  0 - not at all   Patient Health Questionnaire Score: 24       If depressive symptoms identified deferred to follow up visit unless specifically addressed in assesment and plan.    Interpretation of PHQ-9 Total Score   Score Severity   1-4 No Depression   5-9 Mild Depression   10-14 " Moderate Depression   15-19 Moderately Severe Depression   20-27 Severe Depression    LORNE-7 Questionnaire    Feeling nervous, anxious, or on edge: Several days  Not being able to sop or control worrying: More than half the days  Worrying too much about different things: Nearly every day  Trouble relaxing: Several days  Being so restless that it's hard to sit still: Several days  Becoming easily annoyed or irritable: More than half the days  Feeling afraid as if something awful might happen: More than half the days  Total: 12    Interpretation of LORNE 7 Total Score   Score Severity :  0-4 No Anxiety   5-9 Mild Anxiety  10-14 Moderate Anxiety  15-21 Severe Anxiety      Assessment & Plan:     37 y.o. female with the following -     1.  Severe episode of recurrent major depressive disorder, without psychotic features (HCC)  This is a new diagnosis.  Patient was counseled about different treatment options including lifestyle modification with relaxation/breathing techniques, referral to behavioral health, and/or medications.  Patient states that they would like to trial medications and have placed a referral to behavioral health.  Plan is to start them on sertraline 25 mg.  She has been on this medication in the past, however she was only on it for around a month and then realized her depression is more situational at that time.  They will follow up in 1 month for recheck or medication changes if necessary.  They were counseled extensively about black box warnings/side effects of the medication including but not limited to: weight gain, decreased libido, worsening depression, suicidal ideation, anxiety, agitation, panic attacks, insomnia, irritability, hostility, aggressiveness, impulsivity, hypomania and brent.  They were instructed that if such symptoms are observed, they need to contact us immediately, or if they are feeling suicidal they need to call 911 or go to the emergency department for immediate evaluation.   Referral to psychiatry placed today for evaluation for ADHD.    - Referral to Behavioral Health  - sertraline (ZOLOFT) 25 MG tablet; Take 1 Tablet by mouth every day.  Dispense: 40 Tablet; Refill: 1  - Referral to Psychiatry    I spent a total of 31 minutes with record review (including external notes and labs), exam, communication with the patient, communication with other providers, and documentation of this encounter.     Return in about 4 weeks (around 8/16/2022) for follow up on meds.    Please note that this dictation was created using voice recognition software. I have made every reasonable attempt to correct obvious errors, but I expect that there are errors of grammar and possibly content that I did not discover before finalizing the note.    Electronically signed by Jackelyn Fernandez PA-C on July 19, 2022

## 2022-11-11 ENCOUNTER — OFFICE VISIT (OUTPATIENT)
Dept: MEDICAL GROUP | Facility: PHYSICIAN GROUP | Age: 37
End: 2022-11-11
Payer: COMMERCIAL

## 2022-11-11 VITALS
TEMPERATURE: 99.1 F | HEIGHT: 66 IN | SYSTOLIC BLOOD PRESSURE: 94 MMHG | RESPIRATION RATE: 16 BRPM | WEIGHT: 265.5 LBS | DIASTOLIC BLOOD PRESSURE: 52 MMHG | HEART RATE: 103 BPM | BODY MASS INDEX: 42.67 KG/M2 | OXYGEN SATURATION: 97 %

## 2022-11-11 DIAGNOSIS — N92.6 MISSED PERIOD: ICD-10-CM

## 2022-11-11 DIAGNOSIS — F33.1 MODERATE EPISODE OF RECURRENT MAJOR DEPRESSIVE DISORDER (HCC): ICD-10-CM

## 2022-11-11 DIAGNOSIS — N39.46 MIXED STRESS AND URGE URINARY INCONTINENCE: ICD-10-CM

## 2022-11-11 LAB
INT CON NEG: NEGATIVE
INT CON POS: POSITIVE
POC URINE PREGNANCY TEST: NEGATIVE

## 2022-11-11 PROCEDURE — 99214 OFFICE O/P EST MOD 30 MIN: CPT

## 2022-11-11 PROCEDURE — 81025 URINE PREGNANCY TEST: CPT

## 2022-11-11 RX ORDER — BUPROPION HYDROCHLORIDE 75 MG/1
75 TABLET ORAL 2 TIMES DAILY
Qty: 60 TABLET | Refills: 0 | Status: SHIPPED | OUTPATIENT
Start: 2022-11-11 | End: 2023-04-19

## 2022-11-11 ASSESSMENT — FIBROSIS 4 INDEX: FIB4 SCORE: 0.25

## 2022-11-11 NOTE — ASSESSMENT & PLAN NOTE
Patient reports that she tapered off of her sertraline as it caused significant hair loss.  She reports that she is still struggling with fatigue and feels that she has gained more weight recently.

## 2022-11-11 NOTE — PROGRESS NOTES
"CC:   Chief Complaint   Patient presents with    Amenorrhea     Since 9/11    Weight Check    Medication Reaction     Setraline giving her hair loss         HISTORY OF PRESENT ILLNESS: Patient is a 37 y.o. female established patient who presents today to discuss the following problems below:     Moderate episode of recurrent major depressive disorder (HCC)  Patient reports that she tapered off of her sertraline as it caused significant hair loss.  She reports that she is still struggling with fatigue and feels that she has gained more weight recently.    BMI 40.0-44.9, adult (HCC)  Patient reports that she has previously tried phentermine, but this caused tachycardia and constipation and she did not tolerate it well.  She is quite frustrated by her current weight     Missed period  Patient reports that she took Plan B on September 5 after unprotected intercourse, had a menstrual cycle on September 11 but has not had one since that time.  She is taken multiple pregnancy tests at home that have been negative.  She does admit that she took 2 tablets of Plan B secondary to weight dosing     Mixed stress and urge urinary incontinence  Patient reports that ever since the birth of her children, she struggled both with what sounds like stress and urge incontinence.  She has tried Kegel exercises at home, but this continues to be an issue for her.    History reviewed. No pertinent past medical history.    Allergies:Patient has no known allergies.    Review of Systems: Otherwise negative except for as stated above.      Exam: BP (!) 94/52 (BP Location: Right arm, Patient Position: Sitting, BP Cuff Size: Adult long)   Pulse (!) 103   Temp 37.3 °C (99.1 °F) (Temporal)   Resp 16   Ht 1.676 m (5' 6\")   Wt 120 kg (265 lb 8 oz)   SpO2 97%  Body mass index is 42.85 kg/m².    Gen: Alert and oriented x4. Well developed, well-nourished female in no apparent distress.  Skin: Warm, dry, good turgor, no rashes in visible areas or " lacerations appreciated.   Eye: EOM intact, pupils equal, round and reactive, conjunctiva clear, lids normal.  Neck: Trachea midline, no masses, no thyromegaly  Lungs: Normal effort, CTA bilaterally, no wheezes, rhonchi, or rales. No stridor or audible wheezing. Equal chest expansion.   CV: Regular rate and rhythm. No murmurs, rubs, or gallops.  GI:  Soft, non-tender abdomen with no distention.   MSK: Normal gait, moves all extremities.  Neuro: Alert and oriented x 4, non-focal exam with motor and sensory grossly intact.  Ext: No clubbing, cyanosis, edema.  Psych: Normal behavior, affect and mood.      Assessment/Plan:  37 y.o. female with the following -    1. Missed period  Acute problem.  Suspect related to Plan B.  Pregnancy test in office is negative.  If her menstrual cycle does not return in the next 2 to 3 weeks, we can consider starting on birth control to regulate her cycle again  - POCT Pregnancy    2. Moderate episode of recurrent major depressive disorder (HCC)  Chronic condition, not at goal.  Due to fatigue and struggles with weight, recommend starting on Wellbutrin, 75 mg daily.  If she tolerates this well, consider rotating over to 150 mg daily at next visit  - buPROPion (WELLBUTRIN) 75 MG Tab; Take 1 Tablet by mouth 2 times a day.  Dispense: 60 Tablet; Refill: 0    3. BMI 40.0-44.9, adult (HCC)  Chronic condition, not at goal.  Defer phentermine secondary to cardiac palpitations and poor toleration previously.  Trial of bupropion given for assistance with weight management today  - buPROPion (WELLBUTRIN) 75 MG Tab; Take 1 Tablet by mouth 2 times a day.  Dispense: 60 Tablet; Refill: 0    4. Mixed stress and urge urinary incontinence  Chronic condition, not at goal.  Patient has previously tried Kegel exercises without benefit.  She struggles both with stress and urge incontinence.  Referral for pelvic floor therapy given today  - Referral to Physical Therapy    Follow-up: Return in about 2 weeks  (around 11/25/2022) for pap.    Health Maintenance: Completed      Please note that this dictation was created using voice recognition software. I have made every reasonable attempt to correct obvious errors, but I expect that there are errors of grammar and possibly content that I did not discover before finalizing the note.    Electronically signed by JL Shell on November 11, 2022

## 2022-11-11 NOTE — ASSESSMENT & PLAN NOTE
Patient reports that she has previously tried phentermine, but this caused tachycardia and constipation and she did not tolerate it well.  She is quite frustrated by her current weight

## 2022-11-11 NOTE — ASSESSMENT & PLAN NOTE
Patient reports that she took Plan B on September 5 after unprotected intercourse, had a menstrual cycle on September 11 but has not had one since that time.  She is taken multiple pregnancy tests at home that have been negative.  She does admit that she took 2 tablets of Plan B secondary to weight dosing

## 2022-11-17 ENCOUNTER — NON-PROVIDER VISIT (OUTPATIENT)
Dept: OCCUPATIONAL MEDICINE | Facility: CLINIC | Age: 37
End: 2022-11-17

## 2022-11-17 ENCOUNTER — HOSPITAL ENCOUNTER (EMERGENCY)
Facility: MEDICAL CENTER | Age: 37
End: 2022-11-17
Attending: EMERGENCY MEDICINE
Payer: COMMERCIAL

## 2022-11-17 VITALS
BODY MASS INDEX: 42.91 KG/M2 | HEART RATE: 107 BPM | TEMPERATURE: 98 F | DIASTOLIC BLOOD PRESSURE: 77 MMHG | RESPIRATION RATE: 16 BRPM | OXYGEN SATURATION: 95 % | HEIGHT: 66 IN | SYSTOLIC BLOOD PRESSURE: 127 MMHG | WEIGHT: 266.98 LBS

## 2022-11-17 DIAGNOSIS — S19.80XA BLUNT TRAUMA OF NECK, INITIAL ENCOUNTER: ICD-10-CM

## 2022-11-17 DIAGNOSIS — Z02.89 ENCOUNTER FOR OCCUPATIONAL HEALTH ASSESSMENT: ICD-10-CM
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
## 2022-11-17 LAB
AMP AMPHETAMINE: NORMAL
BAR BARBITURATES: NORMAL
BREATH ALCOHOL COMMENT: NORMAL
BZO BENZODIAZEPINES: NORMAL
COC COCAINE: NORMAL
INT CON NEG: NORMAL
INT CON POS: NORMAL
MDMA ECSTASY: NORMAL
MET METHAMPHETAMINES: NORMAL
MTD METHADONE: NORMAL
OPI OPIATES: NORMAL
OXY OXYCODONE: NORMAL
PCP PHENCYCLIDINE: NORMAL
POC BREATHALIZER: 0 PERCENT (ref 0–0.01)
POC URINE DRUG SCREEN OCDRS: NEGATIVE
THC: NORMAL

## 2022-11-17 PROCEDURE — 80305 DRUG TEST PRSMV DIR OPT OBS: CPT | Performed by: PREVENTIVE MEDICINE

## 2022-11-17 PROCEDURE — 99282 EMERGENCY DEPT VISIT SF MDM: CPT

## 2022-11-17 PROCEDURE — 82075 ASSAY OF BREATH ETHANOL: CPT | Performed by: PREVENTIVE MEDICINE

## 2022-11-17 ASSESSMENT — FIBROSIS 4 INDEX: FIB4 SCORE: 0.25
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
## 2022-11-17 NOTE — ED NOTES
"PO challenge performed, pt tolerated, pt stated, \"feels more sore than usual\".   MD aware and discharge orders have been placed.   "
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
## 2022-11-18 ENCOUNTER — APPOINTMENT (OUTPATIENT)
Dept: MEDICAL GROUP | Facility: PHYSICIAN GROUP | Age: 37
End: 2022-11-18
Payer: COMMERCIAL

## 2022-11-21 ENCOUNTER — HOSPITAL ENCOUNTER (OUTPATIENT)
Facility: MEDICAL CENTER | Age: 37
End: 2022-11-21
Payer: COMMERCIAL

## 2022-11-21 ENCOUNTER — OFFICE VISIT (OUTPATIENT)
Dept: MEDICAL GROUP | Facility: PHYSICIAN GROUP | Age: 37
End: 2022-11-21
Payer: COMMERCIAL

## 2022-11-21 VITALS
HEIGHT: 66 IN | DIASTOLIC BLOOD PRESSURE: 72 MMHG | BODY MASS INDEX: 42.27 KG/M2 | OXYGEN SATURATION: 98 % | SYSTOLIC BLOOD PRESSURE: 110 MMHG | WEIGHT: 263 LBS | RESPIRATION RATE: 16 BRPM | TEMPERATURE: 97.6 F | HEART RATE: 110 BPM

## 2022-11-21 DIAGNOSIS — Z11.3 SCREEN FOR STD (SEXUALLY TRANSMITTED DISEASE): ICD-10-CM

## 2022-11-21 DIAGNOSIS — Z11.51 SCREENING FOR HPV (HUMAN PAPILLOMAVIRUS): ICD-10-CM

## 2022-11-21 DIAGNOSIS — Z12.4 SCREENING FOR CERVICAL CANCER: ICD-10-CM

## 2022-11-21 DIAGNOSIS — Z01.419 ENCOUNTER FOR GYNECOLOGICAL EXAMINATION: ICD-10-CM

## 2022-11-21 PROCEDURE — 87591 N.GONORRHOEAE DNA AMP PROB: CPT

## 2022-11-21 PROCEDURE — 87624 HPV HI-RISK TYP POOLED RSLT: CPT

## 2022-11-21 PROCEDURE — 99000 SPECIMEN HANDLING OFFICE-LAB: CPT

## 2022-11-21 PROCEDURE — 99395 PREV VISIT EST AGE 18-39: CPT

## 2022-11-21 PROCEDURE — 87491 CHLMYD TRACH DNA AMP PROBE: CPT

## 2022-11-21 PROCEDURE — 88175 CYTOPATH C/V AUTO FLUID REDO: CPT

## 2022-11-21 ASSESSMENT — FIBROSIS 4 INDEX: FIB4 SCORE: 0.25

## 2022-11-21 NOTE — PROGRESS NOTES
Subjective:     CC:   Chief Complaint   Patient presents with    Gynecologic Exam       HPI:   Audrey Leyva is a 37 y.o. female who presents for annual exam. She is feeling well and denies any complaints.    Ob-Gyn/ History:    Patient has GYN provider: no  /Para:    Last Pap Smear:  . Yes- history of abnormal pap smears, just monitored  Gyn Surgery:  None.  Current Contraceptive Method:  None. Not currently sexually active.  Last menstrual period:  .  Periods regular. moderate bleeding. Cramping is moderate.   She does take OTC analgesics for cramps.  No significant bloating/fluid retention, pelvic pain, or dyspareunia. No vaginal discharge  Urinary incontinence: Yes - previously referred  Folate intake: None - Counseled     Health Maintenance  Diabetes Screening: Up to date   Diet: Counseled, currently eats salty or sweet foods.    Exercise: None, counseled   Substance Abuse: None   Safe in relationship. Yes  Seat belts, bike helmet, gun safety discussed.  Sun protection used.    Cancer screening  Cervical Cancer Screening: Completed today     Infectious disease screening/Immunizations  --STI Screening: Updated today   --Practices safe sex.  --HIV Screening: Completed today   --Hepatitis C Screening: Completed today   --Immunizations:    Influenza: Up to date    HPV:  NA    Tetanus: Up to date      Other immunizations:  None    She  has no past medical history on file.  She  has a past surgical history that includes cholecystectomy.    History reviewed. No pertinent family history.    Social History     Socioeconomic History    Marital status: Single     Spouse name: Not on file    Number of children: Not on file    Years of education: Not on file    Highest education level: Not on file   Occupational History    Not on file   Tobacco Use    Smoking status: Former     Types: Cigarettes     Quit date: 2018     Years since quitting: 3.9    Smokeless tobacco: Never   Vaping Use     Vaping Use: Never used   Substance and Sexual Activity    Alcohol use: Not Currently    Drug use: No    Sexual activity: Not Currently   Other Topics Concern    Not on file   Social History Narrative    Not on file     Social Determinants of Health     Financial Resource Strain: Not on file   Food Insecurity: Not on file   Transportation Needs: Not on file   Physical Activity: Not on file   Stress: Not on file   Social Connections: Not on file   Intimate Partner Violence: Not on file   Housing Stability: Not on file       Patient Active Problem List    Diagnosis Date Noted    BMI 40.0-44.9, adult ([facility]) 11/11/2022    Missed period 11/11/2022    Mixed stress and urge urinary incontinence 11/11/2022    Moderate episode of recurrent major depressive disorder ([facility]) 07/19/2022    Vision problem 05/11/2022    Left shoulder pain 05/11/2022    Prediabetes 01/11/2022    Rash 12/13/2021    Palpitations 12/13/2021    Obesity (BMI 35.0-39.9 without comorbidity) ([facility]) 08/08/2018         Current Outpatient Medications   Medication Sig Dispense Refill    COLLAGEN PO Take  by mouth.      VITAMIN D PO Take  by mouth.      Omega-3 Fatty Acids (OMEGA 3 PO) Take  by mouth.      buPROPion (WELLBUTRIN) 75 MG Tab Take 1 Tablet by mouth 2 times a day. 60 Tablet 0     No current facility-administered medications for this visit.     No Known Allergies    Review of Systems   Constitutional: Negative for fever, chills and malaise/fatigue.   HENT: Negative for congestion.    Eyes: Negative for pain.   Respiratory: Negative for cough and shortness of breath.    Cardiovascular: Negative for leg swelling.   Gastrointestinal: Negative for nausea, vomiting, abdominal pain and diarrhea.   Genitourinary: Negative for dysuria and hematuria.   Skin: Negative for rash.   Neurological: Negative for dizziness, focal weakness and headaches.   Endo/Heme/Allergies: Does not bruise/bleed easily.   Psychiatric/Behavioral: Negative for depression.  The patient is  "not nervous/anxious.      Objective:     /72 (BP Location: Right arm, Patient Position: Sitting, BP Cuff Size: Large adult)   Pulse (!) 110   Temp 36.4 °C (97.6 °F) (Temporal)   Resp 16   Ht 1.676 m (5' 6\")   Wt 119 kg (263 lb)   LMP 11/12/2022   SpO2 98%   BMI 42.45 kg/m²   Body mass index is 42.45 kg/m².  Wt Readings from Last 4 Encounters:   11/21/22 119 kg (263 lb)   11/17/22 121 kg (266 lb 15.6 oz)   11/11/22 120 kg (265 lb 8 oz)   10/18/22 116 kg (256 lb 8 oz)       Physical Exam:  Constitutional: Well-developed and well-nourished. Not diaphoretic. No distress.   Skin: Skin is warm and dry. No rash noted.  Head: Atraumatic without lesions.  Eyes: Conjunctivae and extraocular motions are normal. Pupils are equal, round, and reactive to light. No scleral icterus.   Ears:  External ears unremarkable. Tympanic membranes clear and intact.  Nose: Nares patent. Septum midline. Turbinates without erythema nor edema. No discharge.   Mouth/Throat: Dentition is intact. Tongue normal. Oropharynx is clear and moist. Posterior pharynx without erythema or exudates.  Neck: Supple, trachea midline. Normal range of motion. No thyromegaly present. No lymphadenopathy--cervical or supraclavicular.  Cardiovascular: Regular rate and rhythm, S1 and S2 without murmur, rubs, or gallops.  Lungs: Normal inspiratory effort, CTA bilaterally, no wheezes/rhonchi/rales  Breast: Breasts examined seated and supine. No skin changes, peau d'orange or nipple retraction. No discharge. No axillary or supraclavicular adenopathy. No masses or nodularity palpable.   Abdomen: Soft, non tender, and without distention. Active bowel sounds in all four quadrants. No rebound, guarding, masses or HSM.  :Perineum and external genitalia normal without rash. Vagina with normal and physiologic discharge. Cervix with visible lesion at the 11 o'clock position but no discharge. Bimanual exam without adnexal masses or cervical motion " tenderness.  Extremities: No cyanosis, clubbing, erythema, nor edema. Distal pulses intact and symmetric.   Musculoskeletal: All major joints AROM full in all directions without pain.  Neurological: Alert and oriented x 3. DTRs 2+/3 and symmetric. No cranial nerve deficit. 5/5 myotomes. Sensation intact.   Psychiatric:  Behavior, mood, and affect are appropriate.    A chaperone was offered to the patient during today's exam. Chaperone name: Tom was present.    Assessment and Plan:     1. Screening for cervical cancer  Thinprep Pap W/HPV and CTNG      2. Encounter for gynecological examination  Thinprep Pap W/HPV and CTNG      3. Screening for HPV (human papillomavirus)  Thinprep Pap W/HPV and CTNG      4. Screen for STD (sexually transmitted disease)  HIV AG/AB COMBO ASSAY SCREENING    HCV Scrn ( [id number] [employer])          HCM:  Up to date   Labs per orders  Immunizations per orders  Patient counseled about skin care, diet, supplements, prenatal vitamins, safe sex and exercise.      Follow-up: Return in about 3 weeks (around 2022) for wellbutrin.

## 2022-11-22 DIAGNOSIS — Z01.419 ENCOUNTER FOR GYNECOLOGICAL EXAMINATION: ICD-10-CM

## 2022-11-22 DIAGNOSIS — Z12.4 SCREENING FOR CERVICAL CANCER: ICD-10-CM

## 2022-11-22 DIAGNOSIS — Z11.51 SCREENING FOR HPV (HUMAN PAPILLOMAVIRUS): ICD-10-CM

## 2022-11-22 LAB
C TRACH DNA GENITAL QL NAA+PROBE: NEGATIVE
CYTOLOGY REG CYTOL: NORMAL
HPV HR 12 DNA CVX QL NAA+PROBE: NEGATIVE
HPV16 DNA SPEC QL NAA+PROBE: NEGATIVE
HPV18 DNA SPEC QL NAA+PROBE: NEGATIVE
N GONORRHOEA DNA GENITAL QL NAA+PROBE: NEGATIVE
SPECIMEN SOURCE: NORMAL
SPECIMEN SOURCE: NORMAL

## 2022-11-29 ENCOUNTER — TELEPHONE (OUTPATIENT)
Dept: CARDIOLOGY | Facility: MEDICAL CENTER | Age: 37
End: 2022-11-29
Payer: COMMERCIAL

## 2022-12-01 PROCEDURE — 93228 REMOTE 30 DAY ECG REV/REPORT: CPT | Performed by: INTERNAL MEDICINE

## 2023-04-19 ENCOUNTER — OFFICE VISIT (OUTPATIENT)
Dept: MEDICAL GROUP | Facility: PHYSICIAN GROUP | Age: 38
End: 2023-04-19
Payer: COMMERCIAL

## 2023-04-19 VITALS
SYSTOLIC BLOOD PRESSURE: 102 MMHG | RESPIRATION RATE: 16 BRPM | TEMPERATURE: 97.8 F | BODY MASS INDEX: 42.33 KG/M2 | HEART RATE: 106 BPM | OXYGEN SATURATION: 95 % | WEIGHT: 263.38 LBS | DIASTOLIC BLOOD PRESSURE: 76 MMHG | HEIGHT: 66 IN

## 2023-04-19 DIAGNOSIS — E66.01 CLASS 3 SEVERE OBESITY DUE TO EXCESS CALORIES WITHOUT SERIOUS COMORBIDITY WITH BODY MASS INDEX (BMI) OF 40.0 TO 44.9 IN ADULT (HCC): ICD-10-CM

## 2023-04-19 DIAGNOSIS — Z13.0 SCREENING FOR ENDOCRINE, NUTRITIONAL, METABOLIC AND IMMUNITY DISORDER: ICD-10-CM

## 2023-04-19 DIAGNOSIS — Z13.21 SCREENING FOR ENDOCRINE, NUTRITIONAL, METABOLIC AND IMMUNITY DISORDER: ICD-10-CM

## 2023-04-19 DIAGNOSIS — F33.1 MODERATE EPISODE OF RECURRENT MAJOR DEPRESSIVE DISORDER (HCC): ICD-10-CM

## 2023-04-19 DIAGNOSIS — Z13.29 SCREENING FOR ENDOCRINE, NUTRITIONAL, METABOLIC AND IMMUNITY DISORDER: ICD-10-CM

## 2023-04-19 DIAGNOSIS — Z13.0 SCREENING FOR IRON DEFICIENCY ANEMIA: ICD-10-CM

## 2023-04-19 DIAGNOSIS — Z13.228 SCREENING FOR ENDOCRINE, NUTRITIONAL, METABOLIC AND IMMUNITY DISORDER: ICD-10-CM

## 2023-04-19 PROCEDURE — 99214 OFFICE O/P EST MOD 30 MIN: CPT

## 2023-04-19 RX ORDER — BUPROPION HYDROCHLORIDE 150 MG/1
150 TABLET ORAL EVERY MORNING
Qty: 90 TABLET | Refills: 0 | Status: SHIPPED | OUTPATIENT
Start: 2023-04-19 | End: 2023-08-03 | Stop reason: SDUPTHER

## 2023-04-19 ASSESSMENT — FIBROSIS 4 INDEX: FIB4 SCORE: 0.25

## 2023-04-19 ASSESSMENT — PATIENT HEALTH QUESTIONNAIRE - PHQ9
5. POOR APPETITE OR OVEREATING: 3 - NEARLY EVERY DAY
CLINICAL INTERPRETATION OF PHQ2 SCORE: 3
SUM OF ALL RESPONSES TO PHQ QUESTIONS 1-9: 11

## 2023-04-19 NOTE — ASSESSMENT & PLAN NOTE
Patient reports that she did try the wellbutrin 75mg BID but discontinued. She is interested in trying this again.     - Tried phentermine but had palpitations, currently on wellbutrin. She has tried many diets, weight watches, keto, low carb, low fat, macro calculator. She did lose some weight with macros. She thinks she gets a good intake of fiber but does not know how many grams she gets. She reports that she does snack a lot throughout the day if she is home, mainly associated with boredom eating.

## 2023-04-19 NOTE — ASSESSMENT & PLAN NOTE
Patient reports that she is having some issues with her mood stability. She was on sertraline which worked well for a couple of months but she experienced massive hair loss and then felt the same.

## 2023-04-19 NOTE — PROGRESS NOTES
"CC:   Chief Complaint   Patient presents with    Follow-Up     Weight loss med        HISTORY OF PRESENT ILLNESS: Patient is a 38 y.o. female established patient who presents today to discuss the following problems below:     BMI 40.0-44.9, adult (HCC)  Patient reports that she did try the wellbutrin 75mg BID but discontinued. She is interested in trying this again.     - Tried phentermine but had palpitations, currently on wellbutrin. She has tried many diets, weight watches, keto, low carb, low fat, macro calculator. She did lose some weight with macros. She thinks she gets a good intake of fiber but does not know how many grams she gets. She reports that she does snack a lot throughout the day if she is home, mainly associated with boredom eating.     Moderate episode of recurrent major depressive disorder (HCC)  Patient reports that she is having some issues with her mood stability. She was on sertraline which worked well for a couple of months but she experienced massive hair loss and then felt the same.     Review of Systems: Otherwise negative except for as stated above.      Exam: /76 (BP Location: Left arm, Patient Position: Sitting, BP Cuff Size: Large adult)   Pulse (!) 106   Temp 36.6 °C (97.8 °F) (Temporal)   Resp 16   Ht 1.676 m (5' 6\")   Wt 119 kg (263 lb 6 oz)   SpO2 95%  Body mass index is 42.51 kg/m².    Physical Exam  Constitutional:       Appearance: Normal appearance.   Pulmonary:      Effort: Pulmonary effort is normal.   Musculoskeletal:      Cervical back: Normal range of motion and neck supple.   Lymphadenopathy:      Cervical: No cervical adenopathy.   Neurological:      General: No focal deficit present.      Mental Status: She is alert and oriented to person, place, and time.   Psychiatric:         Mood and Affect: Mood normal.         Behavior: Behavior normal.       Assessment/Plan:  38 y.o. female with the following -    1. BMI 40.0-44.9, adult ([facility])  2. Class 3 severe " obesity due to excess calories without serious comorbidity with body mass index (BMI) of 40.0 to 44.9 in adult (HCC)  Chronic, not at goal. Patient is due for updated labs. In the interim, I will put in a referral to [facility] to see if she is a candidate for other weight loss assisted medications as she was unable to tolerate phentermine and has not been successful with multiple other dietary efforts.   - TSH; Future  - TRIIDOTHYRONINE; Future  - FREE THYROXINE; Future  - THYROID PEROXIDASE  (TPO) AB; Future  - Comp Metabolic Panel; Future  - HEMOGLOBIN A1C; Future    3. Moderate episode of recurrent major depressive disorder (HCC)  Chronic, not at goal. Patient would like to try wellbutrin again for better management of depression. Follow up in 4 weeks.   - buPROPion (WELLBUTRIN XL) 150 MG XL tablet; Take 1 Tablet by mouth every morning.  Dispense: 90 Tablet; Refill: 0  - TSH; Future  - TRIIDOTHYRONINE; Future  - FREE THYROXINE; Future  - THYROID PEROXIDASE  (TPO) AB; Future    4. Screening for endocrine, nutritional, metabolic and immunity disorder  - Lipid Profile; Future    5. Screening for iron deficiency anemia  - CBC WITH DIFFERENTIAL; Future    Follow-up: Return in about 4 weeks (around 5/17/2023) for depression/anxiety follow up.    Health Maintenance: Completed      Please note that this dictation was created using voice recognition software. I have made every reasonable attempt to correct obvious errors, but I expect that there are errors of grammar and possibly content that I did not discover before finalizing the note.    Electronically signed by JL Shell on April 19, 2023

## 2023-05-05 ENCOUNTER — HOSPITAL ENCOUNTER (OUTPATIENT)
Dept: LAB | Facility: MEDICAL CENTER | Age: 38
End: 2023-05-05
Payer: COMMERCIAL

## 2023-05-05 DIAGNOSIS — Z11.3 SCREEN FOR STD (SEXUALLY TRANSMITTED DISEASE): ICD-10-CM

## 2023-05-05 DIAGNOSIS — Z13.29 SCREENING FOR ENDOCRINE, NUTRITIONAL, METABOLIC AND IMMUNITY DISORDER: ICD-10-CM

## 2023-05-05 DIAGNOSIS — Z13.0 SCREENING FOR ENDOCRINE, NUTRITIONAL, METABOLIC AND IMMUNITY DISORDER: ICD-10-CM

## 2023-05-05 DIAGNOSIS — Z13.0 SCREENING FOR IRON DEFICIENCY ANEMIA: ICD-10-CM

## 2023-05-05 DIAGNOSIS — Z13.228 SCREENING FOR ENDOCRINE, NUTRITIONAL, METABOLIC AND IMMUNITY DISORDER: ICD-10-CM

## 2023-05-05 DIAGNOSIS — Z13.21 SCREENING FOR ENDOCRINE, NUTRITIONAL, METABOLIC AND IMMUNITY DISORDER: ICD-10-CM

## 2023-05-05 DIAGNOSIS — F33.1 MODERATE EPISODE OF RECURRENT MAJOR DEPRESSIVE DISORDER (HCC): ICD-10-CM

## 2023-05-05 DIAGNOSIS — E66.01 CLASS 3 SEVERE OBESITY DUE TO EXCESS CALORIES WITHOUT SERIOUS COMORBIDITY WITH BODY MASS INDEX (BMI) OF 40.0 TO 44.9 IN ADULT (HCC): ICD-10-CM

## 2023-05-05 LAB
ALBUMIN SERPL BCP-MCNC: 3.8 G/DL (ref 3.2–4.9)
ALBUMIN/GLOB SERPL: 1 G/DL
ALP SERPL-CCNC: 96 U/L (ref 30–99)
ALT SERPL-CCNC: 15 U/L (ref 2–50)
ANION GAP SERPL CALC-SCNC: 11 MMOL/L (ref 7–16)
AST SERPL-CCNC: 15 U/L (ref 12–45)
BASOPHILS # BLD AUTO: 0.5 % (ref 0–1.8)
BASOPHILS # BLD: 0.05 K/UL (ref 0–0.12)
BILIRUB SERPL-MCNC: 0.3 MG/DL (ref 0.1–1.5)
BUN SERPL-MCNC: 13 MG/DL (ref 8–22)
CALCIUM ALBUM COR SERPL-MCNC: 9.5 MG/DL (ref 8.5–10.5)
CALCIUM SERPL-MCNC: 9.3 MG/DL (ref 8.5–10.5)
CHLORIDE SERPL-SCNC: 102 MMOL/L (ref 96–112)
CHOLEST SERPL-MCNC: 167 MG/DL (ref 100–199)
CO2 SERPL-SCNC: 24 MMOL/L (ref 20–33)
CREAT SERPL-MCNC: 0.74 MG/DL (ref 0.5–1.4)
EOSINOPHIL # BLD AUTO: 0.16 K/UL (ref 0–0.51)
EOSINOPHIL NFR BLD: 1.5 % (ref 0–6.9)
ERYTHROCYTE [DISTWIDTH] IN BLOOD BY AUTOMATED COUNT: 40.8 FL (ref 35.9–50)
EST. AVERAGE GLUCOSE BLD GHB EST-MCNC: 126 MG/DL
FASTING STATUS PATIENT QL REPORTED: NORMAL
GFR SERPLBLD CREATININE-BSD FMLA CKD-EPI: 107 ML/MIN/1.73 M 2
GLOBULIN SER CALC-MCNC: 4 G/DL (ref 1.9–3.5)
GLUCOSE SERPL-MCNC: 115 MG/DL (ref 65–99)
HBA1C MFR BLD: 6 % (ref 4–5.6)
HCT VFR BLD AUTO: 46.8 % (ref 37–47)
HCV AB SER QL: NORMAL
HDLC SERPL-MCNC: 52 MG/DL
HGB BLD-MCNC: 15.2 G/DL (ref 12–16)
IMM GRANULOCYTES # BLD AUTO: 0.03 K/UL (ref 0–0.11)
IMM GRANULOCYTES NFR BLD AUTO: 0.3 % (ref 0–0.9)
LDLC SERPL CALC-MCNC: 98 MG/DL
LYMPHOCYTES # BLD AUTO: 2.33 K/UL (ref 1–4.8)
LYMPHOCYTES NFR BLD: 21.8 % (ref 22–41)
MCH RBC QN AUTO: 27.4 PG (ref 27–33)
MCHC RBC AUTO-ENTMCNC: 32.5 G/DL (ref 33.6–35)
MCV RBC AUTO: 84.5 FL (ref 81.4–97.8)
MONOCYTES # BLD AUTO: 0.62 K/UL (ref 0–0.85)
MONOCYTES NFR BLD AUTO: 5.8 % (ref 0–13.4)
NEUTROPHILS # BLD AUTO: 7.5 K/UL (ref 2–7.15)
NEUTROPHILS NFR BLD: 70.1 % (ref 44–72)
NRBC # BLD AUTO: 0 K/UL
NRBC BLD-RTO: 0 /100 WBC
PLATELET # BLD AUTO: 279 K/UL (ref 164–446)
PMV BLD AUTO: 10.9 FL (ref 9–12.9)
POTASSIUM SERPL-SCNC: 4.5 MMOL/L (ref 3.6–5.5)
PROT SERPL-MCNC: 7.8 G/DL (ref 6–8.2)
RBC # BLD AUTO: 5.54 M/UL (ref 4.2–5.4)
SODIUM SERPL-SCNC: 137 MMOL/L (ref 135–145)
T3 SERPL-MCNC: 122 NG/DL (ref 60–181)
T4 FREE SERPL-MCNC: 1.2 NG/DL (ref 0.93–1.7)
THYROPEROXIDASE AB SERPL-ACNC: 20 IU/ML (ref 0–9)
TRIGL SERPL-MCNC: 87 MG/DL (ref 0–149)
TSH SERPL DL<=0.005 MIU/L-ACNC: 2.99 UIU/ML (ref 0.38–5.33)
WBC # BLD AUTO: 10.7 K/UL (ref 4.8–10.8)

## 2023-05-05 PROCEDURE — 86376 MICROSOMAL ANTIBODY EACH: CPT

## 2023-05-05 PROCEDURE — 84439 ASSAY OF FREE THYROXINE: CPT

## 2023-05-05 PROCEDURE — 83036 HEMOGLOBIN GLYCOSYLATED A1C: CPT

## 2023-05-05 PROCEDURE — 36415 COLL VENOUS BLD VENIPUNCTURE: CPT

## 2023-05-05 PROCEDURE — G0472 HEP C SCREEN HIGH RISK/OTHER: HCPCS

## 2023-05-05 PROCEDURE — 80061 LIPID PANEL: CPT

## 2023-05-05 PROCEDURE — 84443 ASSAY THYROID STIM HORMONE: CPT

## 2023-05-05 PROCEDURE — 80053 COMPREHEN METABOLIC PANEL: CPT

## 2023-05-05 PROCEDURE — 84480 ASSAY TRIIODOTHYRONINE (T3): CPT

## 2023-05-05 PROCEDURE — 85025 COMPLETE CBC W/AUTO DIFF WBC: CPT

## 2023-06-27 ENCOUNTER — EH NON-PROVIDER (OUTPATIENT)
Dept: OCCUPATIONAL MEDICINE | Facility: CLINIC | Age: 38
End: 2023-06-27

## 2023-06-27 DIAGNOSIS — Z02.89 ENCOUNTER FOR OCCUPATIONAL HEALTH EXAMINATION INVOLVING RESPIRATOR: ICD-10-CM

## 2023-06-27 PROCEDURE — 94375 RESPIRATORY FLOW VOLUME LOOP: CPT | Performed by: NURSE PRACTITIONER

## 2023-08-03 ENCOUNTER — OFFICE VISIT (OUTPATIENT)
Dept: MEDICAL GROUP | Facility: PHYSICIAN GROUP | Age: 38
End: 2023-08-03
Payer: COMMERCIAL

## 2023-08-03 VITALS
HEART RATE: 99 BPM | SYSTOLIC BLOOD PRESSURE: 118 MMHG | RESPIRATION RATE: 19 BRPM | DIASTOLIC BLOOD PRESSURE: 74 MMHG | WEIGHT: 264.2 LBS | OXYGEN SATURATION: 97 % | HEIGHT: 66 IN | BODY MASS INDEX: 42.46 KG/M2 | TEMPERATURE: 97.3 F

## 2023-08-03 DIAGNOSIS — E66.9 OBESITY (BMI 35.0-39.9 WITHOUT COMORBIDITY): ICD-10-CM

## 2023-08-03 DIAGNOSIS — F33.1 MODERATE EPISODE OF RECURRENT MAJOR DEPRESSIVE DISORDER (HCC): ICD-10-CM

## 2023-08-03 PROCEDURE — 3074F SYST BP LT 130 MM HG: CPT

## 2023-08-03 PROCEDURE — 3078F DIAST BP <80 MM HG: CPT

## 2023-08-03 PROCEDURE — 99214 OFFICE O/P EST MOD 30 MIN: CPT

## 2023-08-03 RX ORDER — NALTREXONE HYDROCHLORIDE 50 MG/1
TABLET, FILM COATED ORAL
COMMUNITY
Start: 2023-07-12 | End: 2023-08-03

## 2023-08-03 RX ORDER — NALTREXONE HYDROCHLORIDE 50 MG/1
100 TABLET, FILM COATED ORAL DAILY
Qty: 60 TABLET | Refills: 2 | Status: SHIPPED | OUTPATIENT
Start: 2023-08-03 | End: 2024-02-05

## 2023-08-03 RX ORDER — BUPROPION HYDROCHLORIDE 150 MG/1
150 TABLET ORAL EVERY MORNING
Qty: 90 TABLET | Refills: 2 | Status: SHIPPED | OUTPATIENT
Start: 2023-08-03 | End: 2024-02-05

## 2023-08-03 RX ORDER — CHOLECALCIFEROL (VITAMIN D3) 1250 MCG
CAPSULE ORAL
COMMUNITY
End: 2024-02-05

## 2023-08-03 ASSESSMENT — FIBROSIS 4 INDEX: FIB4 SCORE: 0.51

## 2023-08-03 NOTE — PROGRESS NOTES
"CC:   Chief Complaint   Patient presents with    Medication Refill     Wellbutrin      Weight Loss     Pts insurance didn't cover [employer] and she will need her weight loss meds         HISTORY OF PRESENT ILLNESS: Patient is a 38 y.o. female established patient who presents today to discuss the following problems below:     Obesity (BMI 35.0-39.9 without comorbidity) (HCC)  Patient presents for follow-up.  She was started on naltrexone 50 mg daily through [employer], however unfortunately her insurance recently stopped joseph with [facility].  She is also taking Wellbutrin 150 mg every morning. She reports that they also added metformin 500mg ER daily but she has not been taking it consistently due to diarrhea. She does feel some appetite suppression, has been on this for the last two months. She takes this in the mornings.     Review of Systems: Otherwise negative except for as stated above.      Exam: /74   Pulse 99   Temp 36.3 °C (97.3 °F) (Temporal)   Resp 19   Ht 1.676 m (5' 6\")   Wt 120 kg (264 lb 3.2 oz)   SpO2 97%  Body mass index is 42.64 kg/m².    Physical Exam  Constitutional:       Appearance: Normal appearance.   Eyes:      Extraocular Movements: Extraocular movements intact.   Pulmonary:      Effort: Pulmonary effort is normal.   Musculoskeletal:      Cervical back: Normal range of motion.   Neurological:      General: No focal deficit present.      Mental Status: She is alert and oriented to person, place, and time.   Psychiatric:         Mood and Affect: Mood normal.         Behavior: Behavior normal.       Assessment/Plan:  38 y.o. female with the following -    1. Obesity (BMI 35.0-39.9 without comorbidity) (HCC)  Chronic, stable. Continue medications as below.   - naltrexone (DEPADE) 50 MG Tab; Take 2 Tablets by mouth every day.  Dispense: 60 Tablet; Refill: 2    2. Moderate episode of recurrent major depressive disorder (HCC)  Chronic, stable. Continue medications as below. "   - buPROPion (WELLBUTRIN XL) 150 MG XL tablet; Take 1 Tablet by mouth every morning.  Dispense: 90 Tablet; Refill: 2      Follow-up: Return in about 3 months (around 11/3/2023) for weight loss.    Health Maintenance: Completed      Please note that this dictation was created using voice recognition software. I have made every reasonable attempt to correct obvious errors, but I expect that there are errors of grammar and possibly content that I did not discover before finalizing the note.    Electronically signed by JL Shell on August 3, 2023

## 2023-08-09 ENCOUNTER — OFFICE VISIT (OUTPATIENT)
Dept: URGENT CARE | Facility: PHYSICIAN GROUP | Age: 38
End: 2023-08-09
Payer: COMMERCIAL

## 2023-08-09 VITALS
BODY MASS INDEX: 42.43 KG/M2 | RESPIRATION RATE: 16 BRPM | HEART RATE: 105 BPM | OXYGEN SATURATION: 96 % | SYSTOLIC BLOOD PRESSURE: 110 MMHG | HEIGHT: 66 IN | TEMPERATURE: 97.4 F | DIASTOLIC BLOOD PRESSURE: 70 MMHG | WEIGHT: 264 LBS

## 2023-08-09 DIAGNOSIS — J01.40 ACUTE NON-RECURRENT PANSINUSITIS: ICD-10-CM

## 2023-08-09 PROCEDURE — 3074F SYST BP LT 130 MM HG: CPT | Performed by: PHYSICIAN ASSISTANT

## 2023-08-09 PROCEDURE — 3078F DIAST BP <80 MM HG: CPT | Performed by: PHYSICIAN ASSISTANT

## 2023-08-09 PROCEDURE — 99213 OFFICE O/P EST LOW 20 MIN: CPT | Performed by: PHYSICIAN ASSISTANT

## 2023-08-09 RX ORDER — AMOXICILLIN AND CLAVULANATE POTASSIUM 875; 125 MG/1; MG/1
1 TABLET, FILM COATED ORAL 2 TIMES DAILY
Qty: 14 TABLET | Refills: 0 | Status: SHIPPED | OUTPATIENT
Start: 2023-08-09 | End: 2023-08-16

## 2023-08-09 ASSESSMENT — ENCOUNTER SYMPTOMS
DIARRHEA: 0
SHORTNESS OF BREATH: 0
COUGH: 1
DIAPHORESIS: 0
ABDOMINAL PAIN: 0
VOMITING: 0
MYALGIAS: 0
SINUS PAIN: 1
DIZZINESS: 0
FEVER: 0
NAUSEA: 0
SPUTUM PRODUCTION: 1
WHEEZING: 0
PALPITATIONS: 0
HEADACHES: 0
SORE THROAT: 1
CHILLS: 0

## 2023-08-09 ASSESSMENT — FIBROSIS 4 INDEX: FIB4 SCORE: 0.51

## 2023-08-10 NOTE — PROGRESS NOTES
Subjective:     CHIEF COMPLAINT  Chief Complaint   Patient presents with    URI     Runny congestion nose ,thick green mucus feels like going into chest off an on for a month        HPI  Audrey Leyva is a very pleasant 38 y.o. female who presents to the clinic with URI-like symptoms x 1 month.  Patient has been experiencing sinus congestion, runny nose, sore throat, cough and fatigue intermittently over the last month.  States at the onset she believes she was running a fever this has since subsided.  Sore throat at this time is predominantly present in the morning and improves throughout the day.  No difficulty swallowing or handling secretions.  Daughter was sick with similar symptoms 1 week ago and has since improved.  Currently taking Tylenol, allergy medication and TheraFlu for symptoms with minimal relief.    REVIEW OF SYSTEMS  Review of Systems   Constitutional:  Positive for malaise/fatigue. Negative for chills, diaphoresis and fever.   HENT:  Positive for congestion, sinus pain and sore throat. Negative for ear pain.    Respiratory:  Positive for cough and sputum production. Negative for shortness of breath and wheezing.    Cardiovascular:  Negative for chest pain and palpitations.   Gastrointestinal:  Negative for abdominal pain, diarrhea, nausea and vomiting.   Musculoskeletal:  Negative for myalgias.   Neurological:  Negative for dizziness and headaches.   Endo/Heme/Allergies:  Negative for environmental allergies.       PAST MEDICAL HISTORY  Patient Active Problem List    Diagnosis Date Noted    BMI 40.0-44.9, adult ([facility]) 11/11/2022    Missed period 11/11/2022    Mixed stress and urge urinary incontinence 11/11/2022    Moderate episode of recurrent major depressive disorder ([facility]) 07/19/2022    Vision problem 05/11/2022    Left shoulder pain 05/11/2022    Prediabetes 01/11/2022    Rash 12/13/2021    Palpitations 12/13/2021    Obesity (BMI 35.0-39.9 without comorbidity) ([facility]) 08/08/2018  "      SURGICAL HISTORY   has a past surgical history that includes cholecystectomy.    ALLERGIES  No Known Allergies    CURRENT MEDICATIONS  Home Medications       Reviewed by Krish Knott P.A.-C. (Physician Assistant) on 23 at 1837  Med List Status: <None>     Medication Last Dose Status   buPROPion (WELLBUTRIN XL) 150 MG XL tablet Taking Active   Cholecalciferol (VITAMIN D3) 1.25 MG (31041 UT) Cap Taking Active   COLLAGEN PO Taking Active   naltrexone (DEPADE) 50 MG Tab Taking Active   Omega-3 Fatty Acids (OMEGA 3 PO) Not Taking Active   VITAMIN D PO Not Taking Active                    SOCIAL HISTORY  Social History     Tobacco Use    Smoking status: Former     Types: Cigarettes     Quit date: 2018     Years since quittin.6    Smokeless tobacco: Never   Vaping Use    Vaping Use: Never used   Substance and Sexual Activity    Alcohol use: Not Currently    Drug use: No    Sexual activity: Not Currently       FAMILY HISTORY  History reviewed. No pertinent family history.       Objective:     VITAL SIGNS: /70   Pulse (!) 105   Temp 36.3 °C (97.4 °F) (Temporal)   Resp 16   Ht 1.676 m (5' 6\")   Wt 120 kg (264 lb)   LMP 2023   SpO2 96%   BMI 42.61 kg/m²     PHYSICAL EXAM  Physical Exam  Constitutional:       General: She is not in acute distress.     Appearance: Normal appearance. She is not ill-appearing, toxic-appearing or diaphoretic.   HENT:      Head: Normocephalic and atraumatic.      Right Ear: Ear canal and external ear normal.      Left Ear: Ear canal and external ear normal.      Ears:      Comments: Bilateral middle ear effusion.  TMs mildly erythematous.     Nose: Congestion and rhinorrhea present.      Mouth/Throat:      Mouth: Mucous membranes are moist.      Pharynx: No oropharyngeal exudate or posterior oropharyngeal erythema.   Eyes:      Conjunctiva/sclera: Conjunctivae normal.   Cardiovascular:      Rate and Rhythm: Regular rhythm. Tachycardia present.      Pulses: " Normal pulses.      Heart sounds: Normal heart sounds.   Pulmonary:      Effort: Pulmonary effort is normal.      Breath sounds: Normal breath sounds. No wheezing, rhonchi or rales.   Musculoskeletal:      Cervical back: Normal range of motion. No muscular tenderness.   Lymphadenopathy:      Cervical: No cervical adenopathy.   Skin:     General: Skin is warm and dry.      Capillary Refill: Capillary refill takes less than 2 seconds.   Neurological:      Mental Status: She is alert.   Psychiatric:         Mood and Affect: Mood normal.         Thought Content: Thought content normal.         Assessment/Plan:     1. Acute non-recurrent pansinusitis  - amoxicillin-clavulanate (AUGMENTIN) 875-125 MG Tab; Take 1 Tablet by mouth 2 times a day for 7 days.  Dispense: 14 Tablet; Refill: 0      MDM/Comments:    -Advised starting Flonase, Sudafed and Mucinex.  -You may try saline irrigation or neti pot.   -Drink plenty of fluids.  -Ibuprofen or Tylenol as directed for pain.   -Warm compress to sinuses.      Follow up with primary care provider. Urgently for worsening symptoms, persistent fevers, facial swelling, visual changes, weakness, elevated heart rate, stiff neck, symptoms last longer than 10 days, or any other concerns.    Differential diagnosis, natural history, supportive care, and indications for immediate follow-up discussed. All questions answered. Patient agrees with the plan of care.    Follow-up as needed if symptoms worsen or fail to improve to PCP, Urgent care or Emergency Room.    I have personally reviewed prior external notes and test results pertinent to today's visit.  I have independently reviewed and interpreted all diagnostics ordered during this urgent care acute visit.   Discussed management options (risks,benefits, and alternatives to treatment). Pt expresses understanding and the treatment plan was agreed upon. Questions were encouraged and answered to pt's satisfaction.    Please note that this  dictation was created using voice recognition software. I have made a reasonable attempt to correct obvious errors, but I expect that there are errors of grammar and possibly content that I did not discover before finalizing the note.

## 2023-09-06 ENCOUNTER — OFFICE VISIT (OUTPATIENT)
Dept: URGENT CARE | Facility: PHYSICIAN GROUP | Age: 38
End: 2023-09-06
Payer: COMMERCIAL

## 2023-09-06 VITALS
BODY MASS INDEX: 41.46 KG/M2 | TEMPERATURE: 97.2 F | RESPIRATION RATE: 18 BRPM | HEART RATE: 100 BPM | OXYGEN SATURATION: 96 % | DIASTOLIC BLOOD PRESSURE: 82 MMHG | SYSTOLIC BLOOD PRESSURE: 126 MMHG | HEIGHT: 66 IN | WEIGHT: 258 LBS

## 2023-09-06 DIAGNOSIS — J02.9 PHARYNGITIS, UNSPECIFIED ETIOLOGY: ICD-10-CM

## 2023-09-06 DIAGNOSIS — J06.9 VIRAL URI: ICD-10-CM

## 2023-09-06 LAB — S PYO DNA SPEC NAA+PROBE: NOT DETECTED

## 2023-09-06 PROCEDURE — 3074F SYST BP LT 130 MM HG: CPT | Performed by: NURSE PRACTITIONER

## 2023-09-06 PROCEDURE — 3079F DIAST BP 80-89 MM HG: CPT | Performed by: NURSE PRACTITIONER

## 2023-09-06 PROCEDURE — 99213 OFFICE O/P EST LOW 20 MIN: CPT | Performed by: NURSE PRACTITIONER

## 2023-09-06 PROCEDURE — 87651 STREP A DNA AMP PROBE: CPT | Performed by: NURSE PRACTITIONER

## 2023-09-06 ASSESSMENT — FIBROSIS 4 INDEX: FIB4 SCORE: 0.51

## 2023-09-06 NOTE — LETTER
September 6, 2023    To Whom It May Concern:         This is confirmation that Audrey Leyva attended her scheduled appointment with SISSY Oquendo on 9/06/23. Please excuse from work due to illness 9/07/23. May return 9/8/23.       Sincerely,          DAWSON Oquendo.  [phone]

## 2023-09-07 NOTE — PROGRESS NOTES
Date: 09/06/23     Chief Complaint:    Chief Complaint   Patient presents with    Pharyngitis     Fever, runny nose, red spots in back of throat         History of Present Illness: 38 y.o. female  presents to clinic with several day history of sore throat fever body aches rhinorrhea and noticing red spots in the back of her throat.  She states the fever and body aches have improved at this point.  She has been using over-the-counter cold medications to help manage symptoms.  She denies any shortness of breath chest pain or leg swelling.  No nausea vomiting diarrhea.    ROS:  As stated in HPI       Medical/SX/ Social History:  Reviewed per chart    Medications:    Current Outpatient Medications on File Prior to Visit   Medication Sig Dispense Refill    Cholecalciferol (VITAMIN D3) 1.25 MG (93978 UT) Cap Take  by mouth.      naltrexone (DEPADE) 50 MG Tab Take 2 Tablets by mouth every day. 60 Tablet 2    buPROPion (WELLBUTRIN XL) 150 MG XL tablet Take 1 Tablet by mouth every morning. 90 Tablet 2    COLLAGEN PO Take  by mouth.      VITAMIN D PO Take  by mouth. (Patient not taking: Reported on 4/19/2023)      Omega-3 Fatty Acids (OMEGA 3 PO) Take  by mouth. (Patient not taking: Reported on 4/19/2023)       No current facility-administered medications on file prior to visit.        Allergies:    Patient has no known allergies.     Problem list, medications, and allergies reviewed by myself today in Epic       Physical Exam:  Vitals:    09/06/23 1741   BP: 126/82   Pulse: 100   Resp: 18   Temp: 36.2 °C (97.2 °F)   SpO2: 96%        Physical Exam  Vitals reviewed.   Constitutional:       General: She is not in acute distress.     Appearance: Normal appearance. She is well-developed. She is not toxic-appearing.   HENT:      Head: Normocephalic and atraumatic.      Right Ear: Tympanic membrane, ear canal and external ear normal.      Left Ear: Tympanic membrane, ear canal and external ear normal.      Nose: Congestion and  rhinorrhea present.      Mouth/Throat:      Lips: Pink.      Mouth: Mucous membranes are moist.      Pharynx: Pharyngeal swelling and posterior oropharyngeal erythema present.      Tonsils: No tonsillar exudate.   Eyes:      General: Lids are normal. Gaze aligned appropriately. No allergic shiner or scleral icterus.     Extraocular Movements: Extraocular movements intact.      Conjunctiva/sclera: Conjunctivae normal.      Pupils: Pupils are equal, round, and reactive to light.   Cardiovascular:      Rate and Rhythm: Normal rate and regular rhythm.      Pulses:           Radial pulses are 2+ on the right side and 2+ on the left side.      Heart sounds: Normal heart sounds.   Pulmonary:      Effort: Pulmonary effort is normal.      Breath sounds: Normal breath sounds. No wheezing.   Musculoskeletal:      Right lower leg: No edema.      Left lower leg: No edema.   Lymphadenopathy:      Cervical: No cervical adenopathy.   Skin:     General: Skin is warm.      Capillary Refill: Capillary refill takes less than 2 seconds.      Coloration: Skin is not cyanotic or pale.      Findings: No rash.   Neurological:      Mental Status: She is alert.      Gait: Gait is intact.   Psychiatric:         Behavior: Behavior normal. Behavior is cooperative.          Diagnostics:      Recent Results (from the past 24 hour(s))   POCT CEPHEID GROUP A STREP - PCR    Collection Time: 09/06/23  5:55 PM   Result Value Ref Range    POC Group A Strep, PCR Not Detected Not Detected, Invalid         Diagnostics interpreted by myself.      Medical Decision Making / Plan :  I personally reviewed prior external notes and test results pertinent to today's visit. Pt is clinically stable at today's acute urgent care visit.  Patient appears nontoxic with no acute distress noted. Appropriate for outpatient care at this time. The patient remained stable during the urgent care visit.     Pleasant 38 y.o. female  presented clinic with HPI exam findings most  consistent with viral URI.  Although due to painful swallowing did obtain a POCT strep of which the results were negative.  Work note provided.  Shared decision-making was utilized with patient for treatment plan. Differential Diagnosis, natural history, and supportive care discussed. Did advise patient on conservative measures for management of symptoms.  Patient is agreeable to pursue adequate rest, adequate hydration, saltwater gargle and Neti pot for any symptoms of upper respiratory congestion.  Over-the-counter analgesia and antipyretics on a p.r.n. basis as needed for pain and fever.  Did discuss over-the-counter cough medications.        1. Viral URI      2. Pharyngitis, unspecified etiology    - POCT CEPHEID GROUP A STREP - PCR     Medication discussed included indication for use and the potential benefits and side effects. Education was provided regarding the aforementioned assessments.  All of the patient's questions were answered to their satisfaction at the time of discharge. Patient was encouraged to monitor symptoms closely. Those signs and symptoms which would warrant concern and mandate seeking a higher level of service through the emergency department discussed at length.  Patient stated agreement and understanding of this plan of care.        Disposition:  Patient was discharged in stable condition.    Voice Recognition Disclaimer: Portions of this document were created using voice recognition software. The software does have a chance of producing errors of grammar and possibly content. I have made every reasonable attempt to correct obvious errors, but there may be errors of grammar and possibly content that I did not discover before finalizing the documentation.    Susie Hinds, A.P.R.N.

## 2023-10-22 ENCOUNTER — OFFICE VISIT (OUTPATIENT)
Dept: URGENT CARE | Facility: PHYSICIAN GROUP | Age: 38
End: 2023-10-22
Payer: COMMERCIAL

## 2023-10-22 VITALS
DIASTOLIC BLOOD PRESSURE: 86 MMHG | SYSTOLIC BLOOD PRESSURE: 124 MMHG | WEIGHT: 249.12 LBS | OXYGEN SATURATION: 96 % | HEART RATE: 102 BPM | HEIGHT: 66 IN | RESPIRATION RATE: 18 BRPM | BODY MASS INDEX: 40.04 KG/M2 | TEMPERATURE: 96.8 F

## 2023-10-22 DIAGNOSIS — J01.90 ACUTE BACTERIAL SINUSITIS: ICD-10-CM

## 2023-10-22 DIAGNOSIS — B96.89 ACUTE BACTERIAL SINUSITIS: ICD-10-CM

## 2023-10-22 PROCEDURE — 3079F DIAST BP 80-89 MM HG: CPT | Performed by: PHYSICIAN ASSISTANT

## 2023-10-22 PROCEDURE — 99213 OFFICE O/P EST LOW 20 MIN: CPT | Performed by: PHYSICIAN ASSISTANT

## 2023-10-22 PROCEDURE — 3074F SYST BP LT 130 MM HG: CPT | Performed by: PHYSICIAN ASSISTANT

## 2023-10-22 RX ORDER — AMOXICILLIN AND CLAVULANATE POTASSIUM 875; 125 MG/1; MG/1
1 TABLET, FILM COATED ORAL 2 TIMES DAILY
Qty: 14 TABLET | Refills: 0 | Status: SHIPPED | OUTPATIENT
Start: 2023-10-22 | End: 2023-10-29

## 2023-10-22 ASSESSMENT — ENCOUNTER SYMPTOMS
EYE PAIN: 0
HEADACHES: 0
FEVER: 0
ABDOMINAL PAIN: 0
SORE THROAT: 0
CONSTIPATION: 0
NAUSEA: 0
COUGH: 0
DIARRHEA: 0
VOMITING: 0
MYALGIAS: 0
SHORTNESS OF BREATH: 0
SINUS PAIN: 1
CHILLS: 0

## 2023-10-22 ASSESSMENT — FIBROSIS 4 INDEX: FIB4 SCORE: 0.51

## 2023-10-22 NOTE — PROGRESS NOTES
"Subjective:   Audrey Leyva is a 38 y.o. female who presents for Pharyngitis (Sinus pressure, mucus, ear plugging, progressively worsening X 1 month)      38-year-old female notes recurrent URIs as well as allergies over the last month, has been using Allegra but despite this has had worsening sinus pain and pressure, ear pressure and popping over the last several days.  Feels like her previous incidences of sinus infection.  She is not noted any fevers.    Review of Systems   Constitutional:  Positive for malaise/fatigue. Negative for chills and fever.   HENT:  Positive for congestion, ear pain and sinus pain. Negative for sore throat.    Eyes:  Negative for pain.   Respiratory:  Negative for cough and shortness of breath.    Cardiovascular:  Negative for chest pain.   Gastrointestinal:  Negative for abdominal pain, constipation, diarrhea, nausea and vomiting.   Genitourinary:  Negative for dysuria.   Musculoskeletal:  Negative for myalgias.   Skin:  Negative for rash.   Neurological:  Negative for headaches.       Medications, Allergies, and current problem list reviewed today in Epic.     Objective:     /86 (BP Location: Left arm, Patient Position: Sitting, BP Cuff Size: Adult)   Pulse (!) 102   Temp 36 °C (96.8 °F) (Temporal)   Resp 18   Ht 1.676 m (5' 6\")   Wt 113 kg (249 lb 1.9 oz)   SpO2 96%     Physical Exam  Vitals reviewed.   Constitutional:       Appearance: Normal appearance.   HENT:      Head: Normocephalic and atraumatic.      Right Ear: Tympanic membrane, ear canal and external ear normal.      Left Ear: Tympanic membrane, ear canal and external ear normal.      Nose: Congestion and rhinorrhea present.      Comments: Maxillary sinus TTP     Mouth/Throat:      Mouth: Mucous membranes are moist.      Pharynx: No oropharyngeal exudate or posterior oropharyngeal erythema.      Comments: POST NASAL DRIP  Eyes:      Conjunctiva/sclera: Conjunctivae normal.   Cardiovascular:      Rate and " Rhythm: Normal rate and regular rhythm.   Pulmonary:      Effort: Pulmonary effort is normal.      Breath sounds: Normal breath sounds.   Musculoskeletal:      Cervical back: Normal range of motion.   Lymphadenopathy:      Cervical: No cervical adenopathy.   Skin:     General: Skin is warm and dry.      Capillary Refill: Capillary refill takes less than 2 seconds.   Neurological:      Mental Status: She is alert and oriented to person, place, and time.         Assessment/Plan:     Diagnosis and associated orders:     1. Acute bacterial sinusitis  amoxicillin-clavulanate (AUGMENTIN) 875-125 MG Tab         Comments/MDM:     Patient meets IDSA guidelines for ABRS given duration of symptoms, worsening severity, clinical history and physical exam  Consider antihistamine, nasal steroid, anti-inflammatory, and saline rinses  No evidence of venous sinus thrombosis or more serious etiology  Typically these infections display a slow resolution of symptoms starting at 48-72 hours of treatment.  Mild pressure and pain may continue at end of week of antibiotics which is normal and continue the other supportive care until back to baseline.           Differential diagnosis, natural history, supportive care, and indications for immediate follow-up discussed.    Advised the patient to follow-up with the primary care physician for recheck, reevaluation, and consideration of further management.    Please note that this dictation was created using voice recognition software. I have made a reasonable attempt to correct obvious errors, but I expect that there are errors of grammar and possibly content that I did not discover before finalizing the note.    This note was electronically signed by Juan Valles PA-C

## 2023-11-12 ENCOUNTER — APPOINTMENT (OUTPATIENT)
Dept: RADIOLOGY | Facility: MEDICAL CENTER | Age: 38
End: 2023-11-12
Payer: COMMERCIAL

## 2023-11-12 ENCOUNTER — APPOINTMENT (OUTPATIENT)
Dept: RADIOLOGY | Facility: MEDICAL CENTER | Age: 38
End: 2023-11-12
Attending: EMERGENCY MEDICINE
Payer: COMMERCIAL

## 2023-11-12 ENCOUNTER — HOSPITAL ENCOUNTER (EMERGENCY)
Facility: MEDICAL CENTER | Age: 38
End: 2023-11-12
Attending: EMERGENCY MEDICINE
Payer: COMMERCIAL

## 2023-11-12 VITALS
OXYGEN SATURATION: 95 % | HEART RATE: 89 BPM | DIASTOLIC BLOOD PRESSURE: 72 MMHG | RESPIRATION RATE: 15 BRPM | TEMPERATURE: 97.8 F | SYSTOLIC BLOOD PRESSURE: 120 MMHG | WEIGHT: 249 LBS | BODY MASS INDEX: 40.02 KG/M2 | HEIGHT: 66 IN

## 2023-11-12 DIAGNOSIS — M25.511 ACUTE PAIN OF BOTH SHOULDERS: ICD-10-CM

## 2023-11-12 DIAGNOSIS — M25.512 ACUTE PAIN OF BOTH SHOULDERS: ICD-10-CM

## 2023-11-12 DIAGNOSIS — E04.1 THYROID NODULE: ICD-10-CM

## 2023-11-12 DIAGNOSIS — M54.9 PAIN, UPPER BACK: ICD-10-CM

## 2023-11-12 LAB
ALBUMIN SERPL BCP-MCNC: 3.9 G/DL (ref 3.2–4.9)
ALBUMIN/GLOB SERPL: 1.1 G/DL
ALP SERPL-CCNC: 90 U/L (ref 30–99)
ALT SERPL-CCNC: 15 U/L (ref 2–50)
ANION GAP SERPL CALC-SCNC: 9 MMOL/L (ref 7–16)
AST SERPL-CCNC: 15 U/L (ref 12–45)
BASOPHILS # BLD AUTO: 0.5 % (ref 0–1.8)
BASOPHILS # BLD: 0.06 K/UL (ref 0–0.12)
BILIRUB SERPL-MCNC: 0.3 MG/DL (ref 0.1–1.5)
BUN SERPL-MCNC: 8 MG/DL (ref 8–22)
CALCIUM ALBUM COR SERPL-MCNC: 8.8 MG/DL (ref 8.5–10.5)
CALCIUM SERPL-MCNC: 8.7 MG/DL (ref 8.5–10.5)
CHLORIDE SERPL-SCNC: 106 MMOL/L (ref 96–112)
CK SERPL-CCNC: 76 U/L (ref 0–154)
CO2 SERPL-SCNC: 24 MMOL/L (ref 20–33)
CREAT SERPL-MCNC: 0.56 MG/DL (ref 0.5–1.4)
D DIMER PPP IA.FEU-MCNC: 0.31 UG/ML (FEU) (ref 0–0.5)
EKG IMPRESSION: NORMAL
EOSINOPHIL # BLD AUTO: 0.1 K/UL (ref 0–0.51)
EOSINOPHIL NFR BLD: 0.9 % (ref 0–6.9)
ERYTHROCYTE [DISTWIDTH] IN BLOOD BY AUTOMATED COUNT: 37.6 FL (ref 35.9–50)
GFR SERPLBLD CREATININE-BSD FMLA CKD-EPI: 120 ML/MIN/1.73 M 2
GLOBULIN SER CALC-MCNC: 3.6 G/DL (ref 1.9–3.5)
GLUCOSE SERPL-MCNC: 106 MG/DL (ref 65–99)
HCG SERPL QL: NEGATIVE
HCT VFR BLD AUTO: 43.3 % (ref 37–47)
HGB BLD-MCNC: 14.3 G/DL (ref 12–16)
IMM GRANULOCYTES # BLD AUTO: 0.04 K/UL (ref 0–0.11)
IMM GRANULOCYTES NFR BLD AUTO: 0.4 % (ref 0–0.9)
LIPASE SERPL-CCNC: 42 U/L (ref 11–82)
LYMPHOCYTES # BLD AUTO: 1.71 K/UL (ref 1–4.8)
LYMPHOCYTES NFR BLD: 15.4 % (ref 22–41)
MCH RBC QN AUTO: 27.6 PG (ref 27–33)
MCHC RBC AUTO-ENTMCNC: 33 G/DL (ref 32.2–35.5)
MCV RBC AUTO: 83.4 FL (ref 81.4–97.8)
MONOCYTES # BLD AUTO: 0.67 K/UL (ref 0–0.85)
MONOCYTES NFR BLD AUTO: 6 % (ref 0–13.4)
NEUTROPHILS # BLD AUTO: 8.56 K/UL (ref 1.82–7.42)
NEUTROPHILS NFR BLD: 76.8 % (ref 44–72)
NRBC # BLD AUTO: 0 K/UL
NRBC BLD-RTO: 0 /100 WBC (ref 0–0.2)
PLATELET # BLD AUTO: 295 K/UL (ref 164–446)
PMV BLD AUTO: 9.7 FL (ref 9–12.9)
POTASSIUM SERPL-SCNC: 3.6 MMOL/L (ref 3.6–5.5)
PROT SERPL-MCNC: 7.5 G/DL (ref 6–8.2)
RBC # BLD AUTO: 5.19 M/UL (ref 4.2–5.4)
SODIUM SERPL-SCNC: 139 MMOL/L (ref 135–145)
TROPONIN T SERPL-MCNC: <6 NG/L (ref 6–19)
TROPONIN T SERPL-MCNC: <6 NG/L (ref 6–19)
TSH SERPL DL<=0.005 MIU/L-ACNC: 1.38 UIU/ML (ref 0.38–5.33)
WBC # BLD AUTO: 11.1 K/UL (ref 4.8–10.8)

## 2023-11-12 PROCEDURE — 700111 HCHG RX REV CODE 636 W/ 250 OVERRIDE (IP): Mod: JZ | Performed by: EMERGENCY MEDICINE

## 2023-11-12 PROCEDURE — 71045 X-RAY EXAM CHEST 1 VIEW: CPT

## 2023-11-12 PROCEDURE — 84443 ASSAY THYROID STIM HORMONE: CPT

## 2023-11-12 PROCEDURE — 36415 COLL VENOUS BLD VENIPUNCTURE: CPT

## 2023-11-12 PROCEDURE — 700117 HCHG RX CONTRAST REV CODE 255: Performed by: EMERGENCY MEDICINE

## 2023-11-12 PROCEDURE — 84703 CHORIONIC GONADOTROPIN ASSAY: CPT

## 2023-11-12 PROCEDURE — A9270 NON-COVERED ITEM OR SERVICE: HCPCS | Performed by: EMERGENCY MEDICINE

## 2023-11-12 PROCEDURE — 80053 COMPREHEN METABOLIC PANEL: CPT

## 2023-11-12 PROCEDURE — 83690 ASSAY OF LIPASE: CPT

## 2023-11-12 PROCEDURE — 82550 ASSAY OF CK (CPK): CPT

## 2023-11-12 PROCEDURE — 85379 FIBRIN DEGRADATION QUANT: CPT

## 2023-11-12 PROCEDURE — 93005 ELECTROCARDIOGRAM TRACING: CPT

## 2023-11-12 PROCEDURE — 93005 ELECTROCARDIOGRAM TRACING: CPT | Performed by: EMERGENCY MEDICINE

## 2023-11-12 PROCEDURE — 700102 HCHG RX REV CODE 250 W/ 637 OVERRIDE(OP): Performed by: EMERGENCY MEDICINE

## 2023-11-12 PROCEDURE — 84484 ASSAY OF TROPONIN QUANT: CPT | Mod: 91

## 2023-11-12 PROCEDURE — 71275 CT ANGIOGRAPHY CHEST: CPT

## 2023-11-12 PROCEDURE — 99285 EMERGENCY DEPT VISIT HI MDM: CPT

## 2023-11-12 PROCEDURE — 96374 THER/PROPH/DIAG INJ IV PUSH: CPT

## 2023-11-12 PROCEDURE — 85025 COMPLETE CBC W/AUTO DIFF WBC: CPT

## 2023-11-12 RX ORDER — CYCLOBENZAPRINE HCL 10 MG
10 TABLET ORAL 3 TIMES DAILY PRN
Qty: 15 TABLET | Refills: 0 | Status: SHIPPED | OUTPATIENT
Start: 2023-11-12 | End: 2024-02-05

## 2023-11-12 RX ORDER — KETOROLAC TROMETHAMINE 30 MG/ML
30 INJECTION, SOLUTION INTRAMUSCULAR; INTRAVENOUS ONCE
Status: COMPLETED | OUTPATIENT
Start: 2023-11-12 | End: 2023-11-12

## 2023-11-12 RX ADMIN — KETOROLAC TROMETHAMINE 30 MG: 30 INJECTION, SOLUTION INTRAMUSCULAR at 08:35

## 2023-11-12 RX ADMIN — LIDOCAINE HYDROCHLORIDE 30 ML: 20 SOLUTION OROPHARYNGEAL at 08:36

## 2023-11-12 RX ADMIN — IOHEXOL 100 ML: 350 INJECTION, SOLUTION INTRAVENOUS at 13:30

## 2023-11-12 ASSESSMENT — FIBROSIS 4 INDEX: FIB4 SCORE: 0.51

## 2023-11-12 ASSESSMENT — PAIN DESCRIPTION - PAIN TYPE: TYPE: ACUTE PAIN

## 2023-11-12 NOTE — DISCHARGE INSTRUCTIONS
Return to the ER for difficulty breathing, chest pain, nausea, sweatiness, increasing pain, fever, or other concerns.    Follow-up with your primary care doctor for recheck tomorrow regarding your symptoms.  We recommend a stress test within the next week.  Follow-up regarding your thyroid nodule.    Take ibuprofen 6 or milligrams every 6 hours with food as needed for pain along with Flexeril for possible muscular pain

## 2023-11-12 NOTE — ED PROVIDER NOTES
ED Provider Note    CHIEF COMPLAINT  Chief Complaint   Patient presents with    Chest Pain    Arm Pain    Shoulder Pain    Back Pain       EXTERNAL RECORDS REVIEWED  Outpatient labs & studies microsomal TPO ABS elevated at 20    HPI/ROS  LIMITATION TO HISTORY   Select: : None    OUTSIDE HISTORIAN(S):  Friend is at the bedside    Audrey Leyva is a 38 y.o. female who presents for pain in her anterior neck, shoulders, and upper back area.  The pain was first noted along with chest burning yesterday.  She thought maybe she was having acid symptoms but she was not having any regurgitation although she does have a history of reflux.  Patient drank some milk and the chest pain resolved but the neck, shoulder, and upper back pain has persisted and has been constant.  It is improved with reclining with her arms above her head.  She states it is sensitive to touch her neck but it is not stiff.  Patient states it hurts in the muscles of her neck to swallow.    Patient takes Wellbutrin but is only taking it occasionally when she remembers.  She is also been taking semaglutide weekly for a couple of months.    Patient denies shortness of breath, nausea, vomiting, diaphoresis, calf pain, leg swelling, fever, chills, sick contacts, trauma, lifting, weakness, IV drug use.    Patient states she had an elevated autoimmune marker on outpatient labs recently.  Patient unsure of the name of the lab.    Patient has had ongoing sinus infection and was recently on round 2 of antibiotics.      PAST MEDICAL HISTORY     Denies    SURGICAL HISTORY   has a past surgical history that includes cholecystectomy.    FAMILY HISTORY  Hypertension  Thyroid cancer  Diabetes    SOCIAL HISTORY  Social History     Tobacco Use    Smoking status: Former     Current packs/day: 0.00     Types: Cigarettes     Quit date: 2018     Years since quittin.9    Smokeless tobacco: Never   Vaping Use    Vaping Use: Never used   Substance and Sexual  "Activity    Alcohol use: Not Currently    Drug use: No    Sexual activity: Not Currently       CURRENT MEDICATIONS  Home Medications       Reviewed by Kate Marshall R.N. (Registered Nurse) on 11/12/23 at 0656  Med List Status: Partial     Medication Last Dose Status   buPROPion (WELLBUTRIN XL) 150 MG XL tablet  Active   Cholecalciferol (VITAMIN D3) 1.25 MG (76170 UT) Cap  Active   COLLAGEN PO  Active   naltrexone (DEPADE) 50 MG Tab  Active   Omega-3 Fatty Acids (OMEGA 3 PO)  Active   VITAMIN D PO  Active                    ALLERGIES  No Known Allergies    PHYSICAL EXAM  VITAL SIGNS: /63   Pulse 95   Temp 36.8 °C (98.2 °F) (Temporal)   Resp 16   Ht 1.676 m (5' 6\")   Wt 113 kg (249 lb)   SpO2 94%   BMI 40.19 kg/m²    General:  WD female with elevated BMI, nontoxic appearing in NAD; A+Ox3; V/S as above; afebrile  Skin: warm and dry; good color; no rash  HEENT: NCAT; EOMs intact; PERRL; no scleral icterus; oropharynx clear, no trismus, no drooling, no erythema or exudate  Neck: FROM; no LAD, no stridor, no meningismus; no crepitus  Cardiovascular: Regular heart rate and rhythm.  No murmurs, rubs, or gallops; pulses 2+ bilaterally radially and DP areas  Lungs: No respiratory distress or tachypnea; Clear to auscultation with good air movement bilaterally.  No wheezes, rhonchi, or rales.   Abdomen: BS present; soft; NTND; no rebound, guarding, or rigidity.  No organomegaly or pulsatile mass  Extremities: PAREDES x 4; no e/o trauma; no pedal edema; neg Guillermo's  Neurologic: CNs III-XII grossly intact; speech clear; distal sensation intact; strength 5/5 UE/LEs  Psychiatric: Appropriate affect, normal mood      DIAGNOSTIC STUDIES / PROCEDURES  EKG  I have independently interpreted this EKG which showed a sinus tachycardia.  No acute ST changes.    LABS  Results for orders placed or performed during the hospital encounter of 11/12/23   CBC with Differential   Result Value Ref Range    WBC 11.1 (H) 4.8 - 10.8 " K/uL    RBC 5.19 4.20 - 5.40 M/uL    Hemoglobin 14.3 12.0 - 16.0 g/dL    Hematocrit 43.3 37.0 - 47.0 %    MCV 83.4 81.4 - 97.8 fL    MCH 27.6 27.0 - 33.0 pg    MCHC 33.0 32.2 - 35.5 g/dL    RDW 37.6 35.9 - 50.0 fL    Platelet Count 295 164 - 446 K/uL    MPV 9.7 9.0 - 12.9 fL    Neutrophils-Polys 76.80 (H) 44.00 - 72.00 %    Lymphocytes 15.40 (L) 22.00 - 41.00 %    Monocytes 6.00 0.00 - 13.40 %    Eosinophils 0.90 0.00 - 6.90 %    Basophils 0.50 0.00 - 1.80 %    Immature Granulocytes 0.40 0.00 - 0.90 %    Nucleated RBC 0.00 0.00 - 0.20 /100 WBC    Neutrophils (Absolute) 8.56 (H) 1.82 - 7.42 K/uL    Lymphs (Absolute) 1.71 1.00 - 4.80 K/uL    Monos (Absolute) 0.67 0.00 - 0.85 K/uL    Eos (Absolute) 0.10 0.00 - 0.51 K/uL    Baso (Absolute) 0.06 0.00 - 0.12 K/uL    Immature Granulocytes (abs) 0.04 0.00 - 0.11 K/uL    NRBC (Absolute) 0.00 K/uL   Complete Metabolic Panel (CMP)   Result Value Ref Range    Sodium 139 135 - 145 mmol/L    Potassium 3.6 3.6 - 5.5 mmol/L    Chloride 106 96 - 112 mmol/L    Co2 24 20 - 33 mmol/L    Anion Gap 9.0 7.0 - 16.0    Glucose 106 (H) 65 - 99 mg/dL    Bun 8 8 - 22 mg/dL    Creatinine 0.56 0.50 - 1.40 mg/dL    Calcium 8.7 8.5 - 10.5 mg/dL    Correct Calcium 8.8 8.5 - 10.5 mg/dL    AST(SGOT) 15 12 - 45 U/L    ALT(SGPT) 15 2 - 50 U/L    Alkaline Phosphatase 90 30 - 99 U/L    Total Bilirubin 0.3 0.1 - 1.5 mg/dL    Albumin 3.9 3.2 - 4.9 g/dL    Total Protein 7.5 6.0 - 8.2 g/dL    Globulin 3.6 (H) 1.9 - 3.5 g/dL    A-G Ratio 1.1 g/dL   Troponins NOW   Result Value Ref Range    Troponin T <6 6 - 19 ng/L   Troponins in two (2) hours   Result Value Ref Range    Troponin T <6 6 - 19 ng/L   HCG Qual Serum   Result Value Ref Range    Beta-Hcg Qualitative Serum Negative Negative   LIPASE   Result Value Ref Range    Lipase 42 11 - 82 U/L   ESTIMATED GFR   Result Value Ref Range    GFR (CKD-EPI) 120 >60 mL/min/1.73 m 2   TSH WITH REFLEX TO FT4   Result Value Ref Range    TSH 1.380 0.380 - 5.330 uIU/mL    CREATINE KINASE   Result Value Ref Range    CPK Total 76 0 - 154 U/L   D-DIMER   Result Value Ref Range    D-Dimer 0.31 0.00 - 0.50 ug/mL (FEU)   EKG (NOW)   Result Value Ref Range    Report       [facility]    Test Date:  2023  Pt Name:    ALYSSA PHILLIPS               Department: ER  MRN:        [id number]                      Room:       [facility]  Gender:     Female                       Technician: [id number]  :        1985                   Requested By:ER TRIAGE PROTOCOL  Order #:    [id number]                    Reading MD: JEMMA GARCIA MD    Measurements  Intervals                                Axis  Rate:       105                          P:          58  LA:         128                          QRS:        114  QRSD:       103                          T:          24  QT:         346  QTc:        458    Interpretive Statements  Sinus tachycardia  Right axis deviation  No previous ECG available for comparison  Electronically Signed On 2023 07:32:50 PST by JEMMA GARCIA MD           RADIOLOGY  I have independently interpreted the diagnostic imaging associated with this visit and am waiting the final reading from the radiologist.   My preliminary interpretation is as follows:   Chest x-ray reviewed at the bedside and does not show widened mediastinum, pneumothorax, focal consolidation    Radiologist interpretation:   CT-CTA COMPLETE THORACOABDOMINAL AORTA   Final Result      1.  No thoracic or abdominal aortic aneurysm or dissection.   2.  No gross evidence for pulmonary embolus.   3.  No acute cardiopulmonary disease.   4.  Fatty infiltration of liver.   5.  No mesenteric inflammatory process demonstrated.   6.  Low density RIGHT thyroid nodule measuring 16 mm, possibly cyst.  Consider follow-up nonemergent thyroid ultrasound for further evaluation.                        DX-CHEST-PORTABLE (1 VIEW)   Final Result      No acute process.            COURSE  & MEDICAL DECISION MAKING    ED Observation Status? Yes; I am placing the patient in to an observation status due to a diagnostic uncertainty as well as therapeutic intensity. Patient placed in observation status at 8:10 AM, 11/12/2023.     Observation plan is as follows: Labs, imaging, symptom control/therapy    Upon Reevaluation, the patient's condition has: Improved; and will be discharged.    Patient discharged from ED Observation status at 1:54 PM   (Time) 11/12/23 (Date).     INITIAL ASSESSMENT, COURSE AND PLAN  Care Narrative: This is a 38-year-old female who presents with bilateral shoulder, anterior neck and upper back pain.  Patient is afebrile and not hypoxic.  She was tachycardic at triage, possibly due to pain.  No trauma is reported.  She is neurovascularly intact.  I considered ACS, PE, TAD, muscular pain, viral syndrome among other etiologies.  I do not suspect meningitis.  Pain has been constant since last night.    Chest x-ray showed no widened mediastinum, focal consolidation, free air, or pneumothorax.    EKG showed no acute ST changes.  Sinus tachycardia noted.    Labs show mild leukocytosis 11.1 with lymphopenia, possibly indicative of a viral process, glucose of 106 but otherwise normal CMP, normal lipase, negative hCG, normal CPK, normal TSH, normal D-dimer.    Delta troponin negative.    Blood pressure in each arm 116 and 120 systolic.    9:32 AM  Patient feels improved after Toradol.  She rates her pain currently as 4 out of 10.  At this time, I am still unclear as to what is causing her pain but leaning more towards a muscular type of pain accompanied maybe by inflammation secondary to a virus.  Given the fact that it involves both shoulders, arms, and upper back, we will get a CT chest to look for aortic dissection.    1:05 PM  Patient seen walking in the hallway.  Awaiting CTA.  Patient states she feels improved.    1:29 PM  CTA negative for PE and dissection.  Thyroid nodule noted.      Patient reevaluated.  No chest pain since she has been here.  I advised her that she will need outpatient follow-up tomorrow for her symptoms and also for the thyroid nodule seen on CT.  She was encouraged to return to the ER for recurrent chest pain, increasing pain, fever, shortness of breath, or any other concerns.  I will prescribe Flexeril for possible muscle spasm.  She states that the pain is coming back to a lesser degree in her neck/back of her head but overall she feels improved.    DISPOSITION AND DISCUSSIONS    Escalation of care considered, and ultimately not performed:acute inpatient care management, however at this time, the patient is most appropriate for outpatient management    FINAL DIAGNOSIS  1. Pain, upper back    2. Acute pain of both shoulders      Electronically signed by: Tanisha Hicks M.D., 11/12/2023 7:32 AM

## 2023-11-12 NOTE — ED NOTES
Pt returned from CT. Placed back on cardiac monitor/pulse ox & BP, call light in reach. On room air.

## 2023-11-12 NOTE — ED TRIAGE NOTES
"Chief Complaint   Patient presents with    Chest Pain    Arm Pain    Shoulder Pain    Back Pain       39yo F to triage for above complaint. Patient reports on Friday she started feeling sore, and each day the pain has progressed. Pt reports the CP felt like heartburn initially, denies hx of acid reflux. Pt reports it felt like a burning sensation. Denies CP at this time. Pt reports the pain radiates into both shoulders, and upper back. Reports pain is numbing and pulling sensation. Denies trauma. Pt reports taking ibuprofen at home, reports it helped a little bit, but no really. Pt states her pain is 10/10. Denies cardiac hx. But pt has had a holter monitor before due to heart palpitations but reports it was normal. NIH 0. GCS 15.    Pt is alert and oriented, speaking in full sentences, follows commands and responds appropriately to questions. NAD. Resp are even and unlabored.      Pt placed in lobby. Pt educated on triage process. Pt encouraged to alert staff for any changes.     Patient and staff wearing appropriate PPE    BP (!) 136/90   Pulse (!) 114   Temp 36.8 °C (98.2 °F) (Temporal)   Resp 18   Ht 1.676 m (5' 6\")   Wt 113 kg (249 lb)   SpO2 96%   BMI 40.19 kg/m²    "

## 2023-11-12 NOTE — ED NOTES
Pt discharged, all appropriate hospital equipment removed (IV, monitor, pulse ox, etc.). Pt left unit via walking with friend to vehicle for home. Personal belongings with pt when leaving unit. Pt given discharge instructions prior to leaving ER, including where to  prescriptions and when to follow-up if applicable; verbalizes understanding. Pt informed to return to ED if symptoms worsen/return or altered status develop. Copy of discharge instructions signed and turned into DC basket and copy sent with pt. F/u with PCP

## 2023-11-16 ENCOUNTER — APPOINTMENT (OUTPATIENT)
Dept: MEDICAL GROUP | Facility: PHYSICIAN GROUP | Age: 38
End: 2023-11-16
Payer: COMMERCIAL

## 2023-12-05 ENCOUNTER — APPOINTMENT (OUTPATIENT)
Dept: MEDICAL GROUP | Facility: PHYSICIAN GROUP | Age: 38
End: 2023-12-05
Payer: COMMERCIAL

## 2023-12-13 ENCOUNTER — APPOINTMENT (OUTPATIENT)
Dept: MEDICAL GROUP | Facility: PHYSICIAN GROUP | Age: 38
End: 2023-12-13
Payer: COMMERCIAL

## 2024-01-05 ENCOUNTER — HOSPITAL ENCOUNTER (OUTPATIENT)
Dept: RADIOLOGY | Facility: MEDICAL CENTER | Age: 39
End: 2024-01-05
Attending: STUDENT IN AN ORGANIZED HEALTH CARE EDUCATION/TRAINING PROGRAM
Payer: COMMERCIAL

## 2024-01-05 ENCOUNTER — OFFICE VISIT (OUTPATIENT)
Dept: URGENT CARE | Facility: PHYSICIAN GROUP | Age: 39
End: 2024-01-05
Payer: COMMERCIAL

## 2024-01-05 VITALS
TEMPERATURE: 97.2 F | HEIGHT: 66 IN | WEIGHT: 244.4 LBS | HEART RATE: 95 BPM | OXYGEN SATURATION: 98 % | DIASTOLIC BLOOD PRESSURE: 76 MMHG | RESPIRATION RATE: 16 BRPM | BODY MASS INDEX: 39.28 KG/M2 | SYSTOLIC BLOOD PRESSURE: 122 MMHG

## 2024-01-05 DIAGNOSIS — R14.0 ABDOMINAL BLOATING: ICD-10-CM

## 2024-01-05 DIAGNOSIS — K59.00 CONSTIPATION, UNSPECIFIED CONSTIPATION TYPE: ICD-10-CM

## 2024-01-05 DIAGNOSIS — R19.8 UMBILICAL DISCHARGE: ICD-10-CM

## 2024-01-05 LAB
APPEARANCE UR: CLEAR
BILIRUB UR STRIP-MCNC: NEGATIVE MG/DL
COLOR UR AUTO: NORMAL
GLUCOSE UR STRIP.AUTO-MCNC: NEGATIVE MG/DL
KETONES UR STRIP.AUTO-MCNC: NEGATIVE MG/DL
LEUKOCYTE ESTERASE UR QL STRIP.AUTO: NEGATIVE
NITRITE UR QL STRIP.AUTO: NEGATIVE
PH UR STRIP.AUTO: 5.5 [PH] (ref 5–8)
POCT INT CON NEG: NEGATIVE
POCT INT CON POS: POSITIVE
POCT URINE PREGNANCY TEST: NEGATIVE
PROT UR QL STRIP: NEGATIVE MG/DL
RBC UR QL AUTO: NEGATIVE
SP GR UR STRIP.AUTO: >=1.03
UROBILINOGEN UR STRIP-MCNC: 0.2 MG/DL

## 2024-01-05 PROCEDURE — 81025 URINE PREGNANCY TEST: CPT | Performed by: STUDENT IN AN ORGANIZED HEALTH CARE EDUCATION/TRAINING PROGRAM

## 2024-01-05 PROCEDURE — 3078F DIAST BP <80 MM HG: CPT | Performed by: STUDENT IN AN ORGANIZED HEALTH CARE EDUCATION/TRAINING PROGRAM

## 2024-01-05 PROCEDURE — 74018 RADEX ABDOMEN 1 VIEW: CPT

## 2024-01-05 PROCEDURE — 81002 URINALYSIS NONAUTO W/O SCOPE: CPT | Performed by: STUDENT IN AN ORGANIZED HEALTH CARE EDUCATION/TRAINING PROGRAM

## 2024-01-05 PROCEDURE — 3074F SYST BP LT 130 MM HG: CPT | Performed by: STUDENT IN AN ORGANIZED HEALTH CARE EDUCATION/TRAINING PROGRAM

## 2024-01-05 PROCEDURE — 99214 OFFICE O/P EST MOD 30 MIN: CPT | Performed by: STUDENT IN AN ORGANIZED HEALTH CARE EDUCATION/TRAINING PROGRAM

## 2024-01-05 RX ORDER — MUPIROCIN CALCIUM 20 MG/G
1 CREAM TOPICAL 2 TIMES DAILY
Qty: 15 G | Refills: 0 | Status: SHIPPED | OUTPATIENT
Start: 2024-01-05 | End: 2024-02-05

## 2024-01-05 ASSESSMENT — ENCOUNTER SYMPTOMS
PALPITATIONS: 0
VOMITING: 0
WHEEZING: 0
ABDOMINAL PAIN: 1
CHILLS: 0
DIARRHEA: 0
FEVER: 0
FLANK PAIN: 0
CONSTIPATION: 1
NAUSEA: 1
SHORTNESS OF BREATH: 0
BLOOD IN STOOL: 0
COUGH: 0

## 2024-01-05 ASSESSMENT — FIBROSIS 4 INDEX: FIB4 SCORE: 0.49

## 2024-01-05 NOTE — PROGRESS NOTES
"Subjective     Audrey Leyva is a 38 y.o. female who presents with GI Problem (Couple days ago she had fluid drainage in her belly button, it burned with cleaning it with alcohol, constipated, stool is hard for a couple weeks now, feels pressure in her vagina, it hurts to sit down, mild abdominal pain, bloated, nausea )            Audrey is a 38 y.o. female who presents with abdominal bloating.  Patient states she has had symptoms of constipation for a few weeks.  Patient reports that having bowel movements will are small/pebble-like.  Patient has been tolerating p.o. food/fluids.  Patient does report decreased fluid intake due to work schedule.  No blood in stool or black stool. Patient also brings up concern of discharge from her belly button a few weeks ago. Patient noticed discharge/crusting in her bellybutton.  Patient states she cleaned bellybutton out with a Q-tip and rubbing alcohol which did seem to resolve discharge from bellybutton.  No urinary symptoms.  No fever/chills.  No vaginal discharge or odor.        Review of Systems   Constitutional:  Negative for chills and fever.   Respiratory:  Negative for cough, shortness of breath and wheezing.    Cardiovascular:  Negative for chest pain and palpitations.   Gastrointestinal:  Positive for abdominal pain, constipation and nausea. Negative for blood in stool, diarrhea, melena and vomiting.   Genitourinary:  Negative for dysuria, flank pain, frequency, hematuria and urgency.   All other systems reviewed and are negative.             Objective     /76 (BP Location: Left arm, Patient Position: Sitting, BP Cuff Size: Adult)   Pulse 95   Temp 36.2 °C (97.2 °F) (Temporal)   Resp 16   Ht 1.676 m (5' 6\")   Wt 111 kg (244 lb 6.4 oz)   SpO2 98%   BMI 39.45 kg/m²      Physical Exam  Vitals reviewed.   Constitutional:       Appearance: Normal appearance.   HENT:      Head: Normocephalic and atraumatic.      Nose: Nose normal.   Eyes:      Extraocular " Movements: Extraocular movements intact.      Conjunctiva/sclera: Conjunctivae normal.      Pupils: Pupils are equal, round, and reactive to light.   Abdominal:      General: Abdomen is flat. Bowel sounds are normal. There is no distension.      Palpations: Abdomen is soft. There is no mass.      Tenderness: There is generalized abdominal tenderness (minimal; non-acute abdomen). There is no guarding or rebound.      Hernia: No hernia is present.   Skin:     General: Skin is warm and dry.   Neurological:      General: No focal deficit present.      Mental Status: She is alert. Mental status is at baseline.                             Assessment & Plan        1. Constipation, unspecified constipation type  - [id number] VIEW; Future  - SD-[id number]-8 VIEW: Nonobstructive bowel gas pattern with a moderate to large amount of colonic stool.   - Start Miralax 1 scoop with 8 ounces of water daily. Increase fluid intake and increase dietary fiber.      2. Abdominal bloating  - [id number]-4 VIEW; Future  - POCT Urinalysis  - POCT Pregnancy    3. Umbilical discharge  - Minimal erythema of umbilicus/navel. Patient did clean area with q-tip and rubbing alcohol so erythema could be due to to.  No discharge/drainage.  No hernia.  - mupirocin calcium (BACTROBAN) 2 % Cream; Apply 1 Application topically 2 times a day.  Dispense: 15 g; Refill: 0     Differential diagnoses, supportive care measures and indications for immediate follow-up discussed with patient. Pathogenesis of diagnosis discussed including typical length and natural progression. Follow up with PCP.     My total time spent caring for the patient on the day of the encounter was 30 minutes cleaning time patient history, physical exam, discussing differential diagnosis, plan of care, supportive measures, appropriate follow-up and indications for immediate follow-up. This does not include time spent on separately billable procedures/tests.     Instructed to return to  urgent care or nearest emergency department if symptoms fail to improve, for any change in condition, further concerns, or new concerning symptoms.    Patient states understanding and agrees with the plan of care and discharge instructions.

## 2024-01-09 ENCOUNTER — OFFICE VISIT (OUTPATIENT)
Dept: MEDICAL GROUP | Facility: PHYSICIAN GROUP | Age: 39
End: 2024-01-09
Payer: COMMERCIAL

## 2024-01-09 VITALS
HEART RATE: 107 BPM | SYSTOLIC BLOOD PRESSURE: 104 MMHG | RESPIRATION RATE: 18 BRPM | HEIGHT: 66 IN | DIASTOLIC BLOOD PRESSURE: 68 MMHG | WEIGHT: 243 LBS | BODY MASS INDEX: 39.05 KG/M2 | TEMPERATURE: 98.9 F | OXYGEN SATURATION: 97 %

## 2024-01-09 DIAGNOSIS — R79.89 ABNORMAL CBC: ICD-10-CM

## 2024-01-09 DIAGNOSIS — E04.1 THYROID NODULE GREATER THAN OR EQUAL TO 1.5 CM IN DIAMETER INCIDENTALLY NOTED ON IMAGING STUDY: ICD-10-CM

## 2024-01-09 DIAGNOSIS — R76.8 ANTI-TPO ANTIBODIES PRESENT: ICD-10-CM

## 2024-01-09 DIAGNOSIS — R73.03 PREDIABETES: ICD-10-CM

## 2024-01-09 DIAGNOSIS — E66.9 OBESITY (BMI 35.0-39.9 WITHOUT COMORBIDITY): ICD-10-CM

## 2024-01-09 DIAGNOSIS — E04.1 THYROID NODULE: ICD-10-CM

## 2024-01-09 PROCEDURE — 3078F DIAST BP <80 MM HG: CPT

## 2024-01-09 PROCEDURE — 99213 OFFICE O/P EST LOW 20 MIN: CPT

## 2024-01-09 PROCEDURE — 3074F SYST BP LT 130 MM HG: CPT

## 2024-01-09 ASSESSMENT — FIBROSIS 4 INDEX: FIB4 SCORE: 0.49

## 2024-01-09 ASSESSMENT — PATIENT HEALTH QUESTIONNAIRE - PHQ9: CLINICAL INTERPRETATION OF PHQ2 SCORE: 0

## 2024-01-10 ENCOUNTER — HOSPITAL ENCOUNTER (OUTPATIENT)
Dept: RADIOLOGY | Facility: MEDICAL CENTER | Age: 39
End: 2024-01-10
Payer: COMMERCIAL

## 2024-01-10 DIAGNOSIS — E04.1 THYROID NODULE GREATER THAN OR EQUAL TO 1.5 CM IN DIAMETER INCIDENTALLY NOTED ON IMAGING STUDY: ICD-10-CM

## 2024-01-10 PROCEDURE — 76536 US EXAM OF HEAD AND NECK: CPT

## 2024-01-18 ENCOUNTER — TELEPHONE (OUTPATIENT)
Dept: MEDICAL GROUP | Facility: PHYSICIAN GROUP | Age: 39
End: 2024-01-18
Payer: COMMERCIAL

## 2024-01-18 NOTE — TELEPHONE ENCOUNTER
response: Noblt message    2. Patient is requesting imaging - US-Thyroid  results dated: 1/10/24    3. Confirmed results are in chart. Patient advised they will be contacted once interpreted by provider.

## 2024-01-18 NOTE — TELEPHONE ENCOUNTER
Hi, I noticed the results are back for my thyroid ultra sound. Can we send the referral for a biopsy? Thanks!

## 2024-01-24 DIAGNOSIS — E04.1 THYROID NODULE: ICD-10-CM

## 2024-01-28 PROBLEM — E04.1 THYROID NODULE: Status: ACTIVE | Noted: 2024-01-28

## 2024-01-28 NOTE — ASSESSMENT & PLAN NOTE
Patient had a CT scan which noted a potential thyroid cyst. She wishes to discuss further and get further imaging.

## 2024-01-28 NOTE — PROGRESS NOTES
"CC:   Chief Complaint   Patient presents with    Follow-Up     On ct scan         HISTORY OF PRESENT ILLNESS: Patient is a 38 y.o. female established patient who presents today to discuss the following problems below:     Obesity (BMI 35.0-39.9 without comorbidity) (HCC)  Patient reports ozempic is going slowly, unsure of what dosing. Has lost 20 pounds since August.     Thyroid nodule  Patient had a CT scan which noted a potential thyroid cyst. She wishes to discuss further and get further imaging.     Review of Systems: Otherwise negative except for as stated above.      Exam: /68   Pulse (!) 107   Temp 37.2 °C (98.9 °F) (Temporal)   Resp 18   Ht 1.676 m (5' 6\")   Wt 110 kg (243 lb)   SpO2 97%  Body mass index is 39.22 kg/m².    Physical Exam  Constitutional:       Appearance: Normal appearance.   Eyes:      Extraocular Movements: Extraocular movements intact.   Pulmonary:      Effort: Pulmonary effort is normal.   Musculoskeletal:      Cervical back: Normal range of motion.   Neurological:      General: No focal deficit present.      Mental Status: She is alert and oriented to person, place, and time.   Psychiatric:         Mood and Affect: Mood normal.         Behavior: Behavior normal.         Assessment/Plan:  38 y.o. female with the following -    1. Thyroid nodule greater than or equal to 1.5 cm in diameter incidentally noted on imaging study  New finding, obtain ultrasound and updated labs for further evaluation.   - US-THYROID; Future  - THYROID PEROXIDASE  (TPO) AB; Future  - TSH+FREE T4    2. Obesity (BMI 35.0-39.9 without comorbidity) (HCC)  Chronic, stable. Encouraged patient on her weight loss efforts, continue ozempic.     3. Prediabetes  Chronic, stable. Continue to monitor labs as below   - HEMOGLOBIN A1C; Future    4. Anti-TPO antibodies present  Chronic, stable. Continue to monitor labs as below   - THYROID PEROXIDASE  (TPO) AB; Future    5. Abnormal CBC  Acute finding on labs, mildly " elevated WBC with elevated neutrophils, recheck to ensure resolution.   - CBC WITH DIFFERENTIAL; Future  - Comp Metabolic Panel; Future       Follow-up: Return in about 4 weeks (around 2/6/2024).    Health Maintenance: Completed      Please note that this dictation was created using voice recognition software. I have made every reasonable attempt to correct obvious errors, but I expect that there are errors of grammar and possibly content that I did not discover before finalizing the note.    Electronically signed by JL Shell on January 28, 2024

## 2024-01-30 ENCOUNTER — HOSPITAL ENCOUNTER (OUTPATIENT)
Dept: RADIOLOGY | Facility: MEDICAL CENTER | Age: 39
End: 2024-01-30
Payer: COMMERCIAL

## 2024-01-30 DIAGNOSIS — E04.1 THYROID NODULE: ICD-10-CM

## 2024-01-30 LAB — CYTOLOGY REG CYTOL: NORMAL

## 2024-01-30 PROCEDURE — 88173 CYTOPATH EVAL FNA REPORT: CPT

## 2024-01-30 PROCEDURE — 10005 FNA BX W/US GDN 1ST LES: CPT

## 2024-01-30 NOTE — PROGRESS NOTES
US guided right thyroid nodule fine needle aspiration done by Dr. Rivero; NON-SEDATION (no H&P required as this is a NON SEDATION procedure) right aspect of neck access site, dressing CDI; 3 passes in 1 jar of cytolyt obtained, 2 passes in 1 vial afirma obtained and sent to lab. Pt tolerated the procedure well. Pt hemodynamically stable pre/intra/post procedure; all questions and concerns answered prior to being d/c; patient provided with appropriate education for procedure; pt d/c home.

## 2024-02-05 ENCOUNTER — OFFICE VISIT (OUTPATIENT)
Dept: URGENT CARE | Facility: PHYSICIAN GROUP | Age: 39
End: 2024-02-05
Payer: COMMERCIAL

## 2024-02-05 VITALS
BODY MASS INDEX: 39.37 KG/M2 | DIASTOLIC BLOOD PRESSURE: 76 MMHG | HEIGHT: 66 IN | TEMPERATURE: 96.2 F | SYSTOLIC BLOOD PRESSURE: 102 MMHG | OXYGEN SATURATION: 94 % | WEIGHT: 245 LBS | RESPIRATION RATE: 16 BRPM | HEART RATE: 116 BPM

## 2024-02-05 DIAGNOSIS — H10.33 ACUTE BACTERIAL CONJUNCTIVITIS OF BOTH EYES: ICD-10-CM

## 2024-02-05 DIAGNOSIS — R00.0 TACHYCARDIA: ICD-10-CM

## 2024-02-05 DIAGNOSIS — J01.90 ACUTE BACTERIAL SINUSITIS: ICD-10-CM

## 2024-02-05 DIAGNOSIS — B96.89 ACUTE BACTERIAL SINUSITIS: ICD-10-CM

## 2024-02-05 PROCEDURE — 3074F SYST BP LT 130 MM HG: CPT | Performed by: NURSE PRACTITIONER

## 2024-02-05 PROCEDURE — 3078F DIAST BP <80 MM HG: CPT | Performed by: NURSE PRACTITIONER

## 2024-02-05 PROCEDURE — 99214 OFFICE O/P EST MOD 30 MIN: CPT | Performed by: NURSE PRACTITIONER

## 2024-02-05 RX ORDER — AMOXICILLIN AND CLAVULANATE POTASSIUM 875; 125 MG/1; MG/1
1 TABLET, FILM COATED ORAL 2 TIMES DAILY
Qty: 14 TABLET | Refills: 0 | Status: SHIPPED | OUTPATIENT
Start: 2024-02-05 | End: 2024-02-12

## 2024-02-05 RX ORDER — POLYMYXIN B SULFATE AND TRIMETHOPRIM 1; 10000 MG/ML; [USP'U]/ML
1 SOLUTION OPHTHALMIC 4 TIMES DAILY
Qty: 10 ML | Refills: 0 | Status: SHIPPED | OUTPATIENT
Start: 2024-02-05 | End: 2024-02-10

## 2024-02-05 ASSESSMENT — ENCOUNTER SYMPTOMS
ORTHOPNEA: 0
EYE DISCHARGE: 1
SHORTNESS OF BREATH: 0
WHEEZING: 0
CHILLS: 0
HEADACHES: 0
NAUSEA: 0
DIARRHEA: 0
SPUTUM PRODUCTION: 0
COUGH: 1
FEVER: 0
SORE THROAT: 1
MYALGIAS: 0
EYE REDNESS: 1

## 2024-02-05 ASSESSMENT — FIBROSIS 4 INDEX: FIB4 SCORE: 0.49

## 2024-02-05 NOTE — PROGRESS NOTES
Subjective     Audrey PHILLIPS is a 38 y.o. female who presents with Pharyngitis (Sore throat, sinus pressure/pain, congestion x1 week.) and Pink Eye (b/l pink eye x2 days. )            HPI  New problem.  Patient is a very pleasant 38-year-old female who presents with weeklong history of nasal congestion and a 4-day history of bilateral eye discharge and mild redness.  She denies fever, chills, myalgia, nausea, or diarrhea.  She denies much of a cough.  She states that she did have headache, and body aches midweek last week.  She has been taking nighttime TheraFlu only for her symptoms.    Patient has no known allergies.  No current outpatient medications on file prior to visit.     No current facility-administered medications on file prior to visit.     Social History     Socioeconomic History    Marital status: Single     Spouse name: Not on file    Number of children: Not on file    Years of education: Not on file    Highest education level: Not on file   Occupational History    Not on file   Tobacco Use    Smoking status: Former     Current packs/day: 0.00     Types: Cigarettes     Quit date: 2018     Years since quittin.1    Smokeless tobacco: Never   Vaping Use    Vaping Use: Never used   Substance and Sexual Activity    Alcohol use: Not Currently    Drug use: No    Sexual activity: Not Currently   Other Topics Concern    Not on file   Social History Narrative    Not on file     Social Determinants of Health     Financial Resource Strain: Not on file   Food Insecurity: Not on file   Transportation Needs: Not on file   Physical Activity: Not on file   Stress: Not on file   Social Connections: Not on file   Intimate Partner Violence: Not on file   Housing Stability: Not on file     Breast Cancer-related family history is not on file.      Review of Systems   Constitutional:  Positive for malaise/fatigue. Negative for chills and fever.   HENT:  Positive for congestion and sore throat.    Eyes:   "Positive for discharge and redness.   Respiratory:  Positive for cough. Negative for sputum production, shortness of breath and wheezing.    Cardiovascular:  Negative for chest pain and orthopnea.   Gastrointestinal:  Negative for diarrhea and nausea.   Musculoskeletal:  Negative for myalgias.   Neurological:  Negative for headaches.   Endo/Heme/Allergies:  Negative for environmental allergies.   All other systems reviewed and are negative.             Objective     /76 (BP Location: Left arm, Patient Position: Sitting, BP Cuff Size: Adult)   Pulse (!) 116   Temp (!) 35.7 °C (96.2 °F) (Temporal)   Resp 16   Ht 1.676 m (5' 6\")   Wt 111 kg (245 lb)   LMP 12/18/2023   SpO2 94%   BMI 39.54 kg/m²      Physical Exam  Constitutional:       General: She is not in acute distress.     Appearance: Normal appearance. She is well-developed. She is not ill-appearing.   HENT:      Head: Normocephalic.      Right Ear: External ear normal.      Left Ear: External ear normal.      Nose: Mucosal edema and rhinorrhea present.      Mouth/Throat:      Pharynx: No posterior oropharyngeal erythema.   Eyes:      General:         Right eye: Discharge present.         Left eye: Discharge present.     Conjunctiva/sclera:      Right eye: Right conjunctiva is injected.      Left eye: Left conjunctiva is injected (mild injection).   Cardiovascular:      Rate and Rhythm: Regular rhythm. Tachycardia present.      Heart sounds: Normal heart sounds.   Pulmonary:      Effort: Pulmonary effort is normal.      Breath sounds: Normal breath sounds.   Musculoskeletal:         General: Normal range of motion.      Cervical back: Normal range of motion and neck supple.   Lymphadenopathy:      Cervical: No cervical adenopathy.   Skin:     General: Skin is warm and dry.   Neurological:      Mental Status: She is alert and oriented to person, place, and time.   Psychiatric:         Behavior: Behavior normal.         Thought Content: Thought " content normal.                             Assessment & Plan        1. Acute bacterial sinusitis  amoxicillin-clavulanate (AUGMENTIN) 875-125 MG Tab      2. Acute bacterial conjunctivitis of both eyes  polymixin-trimethoprim (POLYTRIM) 19620-2.1 UNIT/ML-% Solution      3. Tachycardia          Augmentin/polytrim.  Tachycardia likely related to illness.  May continue otc medications as directed.  Differential diagnosis, natural history, supportive care, and indications for immediate follow-up were discussed.

## 2024-03-06 ENCOUNTER — OFFICE VISIT (OUTPATIENT)
Dept: URGENT CARE | Facility: PHYSICIAN GROUP | Age: 39
End: 2024-03-06
Payer: COMMERCIAL

## 2024-03-06 VITALS
WEIGHT: 235 LBS | TEMPERATURE: 98.2 F | HEART RATE: 112 BPM | BODY MASS INDEX: 37.77 KG/M2 | SYSTOLIC BLOOD PRESSURE: 112 MMHG | RESPIRATION RATE: 16 BRPM | DIASTOLIC BLOOD PRESSURE: 72 MMHG | HEIGHT: 66 IN | OXYGEN SATURATION: 95 %

## 2024-03-06 DIAGNOSIS — K14.8 TONGUE LESION: ICD-10-CM

## 2024-03-06 DIAGNOSIS — R10.32 LEFT LOWER QUADRANT PAIN: ICD-10-CM

## 2024-03-06 PROCEDURE — 3074F SYST BP LT 130 MM HG: CPT | Performed by: NURSE PRACTITIONER

## 2024-03-06 PROCEDURE — 3078F DIAST BP <80 MM HG: CPT | Performed by: NURSE PRACTITIONER

## 2024-03-06 PROCEDURE — 99214 OFFICE O/P EST MOD 30 MIN: CPT | Performed by: NURSE PRACTITIONER

## 2024-03-06 ASSESSMENT — FIBROSIS 4 INDEX: FIB4 SCORE: 0.5

## 2024-03-12 ASSESSMENT — ENCOUNTER SYMPTOMS
BLOOD IN STOOL: 0
CHILLS: 0
HEARTBURN: 0
CONSTIPATION: 1
VOMITING: 0
EYES NEGATIVE: 1
DIARRHEA: 0
RESPIRATORY NEGATIVE: 1
PSYCHIATRIC NEGATIVE: 1
ABDOMINAL PAIN: 1
CONSTITUTIONAL NEGATIVE: 1
NAUSEA: 0
FEVER: 0
MUSCULOSKELETAL NEGATIVE: 1
NEUROLOGICAL NEGATIVE: 1
CARDIOVASCULAR NEGATIVE: 1

## 2024-03-12 NOTE — PROGRESS NOTES
Subjective:   ALYSSA PHILLIPS is a 39 y.o. female who presents for Other (Been having soreness in her (R) side of tongue x 5 days)      Patient presents with two complaints today.  Initially, she complains of some soreness to the right side of her tongue x 5 days.  She states that she thinks it has started to improve today, but she wanted to have this checked to ensure that it is nothing that needs treatment.  She denies any lesion to her tongue or remembering biting her tongue.  She denies sore throat or fever.  Additionally, she reports some mild left sided abdominal pain.  She reports this is a dull ache, which is intermittent in nature.  She denies anything that makes this better or worse.  No nausea, vomiting or diarrhea.  She states that she was recently rotated to tirzepatide from semaglutide about five days ago.  She states that the left sided abdominal pain started a couple of days ago. She reports some constipation but she states that this preceded the pain and the medication change.  She is passing gas, and does report a normal bowel movement yesterday.  She reports no blood in stool.          Review of Systems   Constitutional: Negative.  Negative for chills, fever and malaise/fatigue.   HENT: Negative.          Right sided tongue pain   Eyes: Negative.    Respiratory: Negative.     Cardiovascular: Negative.    Gastrointestinal:  Positive for abdominal pain and constipation. Negative for blood in stool, diarrhea, heartburn, melena, nausea and vomiting.   Genitourinary: Negative.    Musculoskeletal: Negative.    Skin: Negative.    Neurological: Negative.    Endo/Heme/Allergies: Negative.    Psychiatric/Behavioral: Negative.     All other systems reviewed and are negative.      Medications, Allergies, and current problem list reviewed today in Epic.     Objective:     /72 (BP Location: Left arm, Patient Position: Sitting, BP Cuff Size: Adult long)   Pulse (!) 112   Temp 36.8 °C (98.2 °F)  "(Temporal)   Resp 16   Ht 1.676 m (5' 6\")   Wt 107 kg (235 lb)   SpO2 95%     Physical Exam  Vitals reviewed.   Constitutional:       General: She is not in acute distress.     Appearance: Normal appearance. She is normal weight. She is not ill-appearing.   HENT:      Head: Normocephalic and atraumatic.      Right Ear: Tympanic membrane, ear canal and external ear normal.      Left Ear: Tympanic membrane, ear canal and external ear normal.      Nose: Nose normal.      Mouth/Throat:      Lips: Pink. No lesions.      Mouth: Mucous membranes are moist.      Dentition: Normal dentition. Does not have dentures. No dental tenderness, gingival swelling, dental caries, dental abscesses or gum lesions.      Tongue: Lesions present.      Pharynx: Oropharynx is clear.        Comments: There is a lesion to the right side of the tongue which appears to be an ulceration - possibly from biting in her sleep vs aphthous ulcer.  No evidence of infection. No other lesions to the mouth present.   Eyes:      Extraocular Movements: Extraocular movements intact.      Conjunctiva/sclera: Conjunctivae normal.      Pupils: Pupils are equal, round, and reactive to light.   Cardiovascular:      Rate and Rhythm: Normal rate and regular rhythm.   Pulmonary:      Effort: Pulmonary effort is normal.      Breath sounds: Normal breath sounds.   Abdominal:      General: Abdomen is flat. Bowel sounds are normal. There is no distension. There are no signs of injury.      Palpations: Abdomen is soft.      Tenderness: There is abdominal tenderness in the left upper quadrant and left lower quadrant. There is no right CVA tenderness, left CVA tenderness, guarding or rebound. Negative signs include Lopez's sign, Rovsing's sign, McBurney's sign, psoas sign and obturator sign.          Comments: Mild tenderness to palpation over the left lower and left upper quadrant.  No rebound or guarding.  No masses or organomegaly felt.    Musculoskeletal:         " General: Normal range of motion.      Cervical back: Normal range of motion and neck supple.   Skin:     General: Skin is warm and dry.      Capillary Refill: Capillary refill takes less than 2 seconds.   Neurological:      General: No focal deficit present.      Mental Status: She is alert.   Psychiatric:         Mood and Affect: Mood normal.         Behavior: Behavior normal.         Assessment/Plan:     Diagnosis and associated orders:     1. Tongue lesion        2. Left lower quadrant pain           Comments/MDM:     Reassured patient that the lesion on her tongue looks to be an ulceration either that she may have sustained by biting in her sleep or possibly an aphthous ulcer.  In either instance, the ulcer is not infected and should heal on its own.  It is reassuring that she reports improvement in her pain today. She will return if this worsens and does not resolve on its own.  Discussed with patient that her abdominal pain could be due to constipation, inflammatory bowel disease, diverticulitis, vs possible medication side-effect from tirzepatide.  Discussed that as her symptoms are mild to date, it is reasonable to take an approach of watchful waiting.  If her symptoms worsen, she should go to the ER for further evaluation.   However, if her symptoms remain the same she may follow up with PCP next week for further evaluation.  Patient verbalizes understanding and is in agreement with this plan.          Differential diagnosis, natural history, supportive care, and indications for immediate follow-up discussed.    Advised the patient to follow-up with the primary care physician for recheck, reevaluation, and consideration of further management.    Please note that this dictation was created using voice recognition software. I have made a reasonable attempt to correct obvious errors, but I expect that there are errors of grammar and possibly content that I did not discover before finalizing the note.    This  note was electronically signed by OBED Yeboah

## 2024-04-19 ENCOUNTER — OFFICE VISIT (OUTPATIENT)
Dept: URGENT CARE | Facility: PHYSICIAN GROUP | Age: 39
End: 2024-04-19
Payer: COMMERCIAL

## 2024-04-19 VITALS
SYSTOLIC BLOOD PRESSURE: 102 MMHG | OXYGEN SATURATION: 97 % | HEIGHT: 66 IN | HEART RATE: 109 BPM | WEIGHT: 234.2 LBS | DIASTOLIC BLOOD PRESSURE: 66 MMHG | BODY MASS INDEX: 37.64 KG/M2 | TEMPERATURE: 96.7 F | RESPIRATION RATE: 18 BRPM

## 2024-04-19 DIAGNOSIS — B97.89 VIRAL RESPIRATORY INFECTION: ICD-10-CM

## 2024-04-19 DIAGNOSIS — J98.8 VIRAL RESPIRATORY INFECTION: ICD-10-CM

## 2024-04-19 DIAGNOSIS — J04.0 LARYNGITIS: ICD-10-CM

## 2024-04-19 LAB
FLUAV RNA SPEC QL NAA+PROBE: NEGATIVE
FLUBV RNA SPEC QL NAA+PROBE: NEGATIVE
RSV RNA SPEC QL NAA+PROBE: NEGATIVE
SARS-COV-2 RNA RESP QL NAA+PROBE: NEGATIVE

## 2024-04-19 PROCEDURE — 3078F DIAST BP <80 MM HG: CPT | Performed by: NURSE PRACTITIONER

## 2024-04-19 PROCEDURE — 0241U POCT CEPHEID COV-2, FLU A/B, RSV - PCR: CPT | Performed by: NURSE PRACTITIONER

## 2024-04-19 PROCEDURE — 99213 OFFICE O/P EST LOW 20 MIN: CPT | Performed by: NURSE PRACTITIONER

## 2024-04-19 PROCEDURE — 3074F SYST BP LT 130 MM HG: CPT | Performed by: NURSE PRACTITIONER

## 2024-04-19 RX ORDER — BENZONATATE 100 MG/1
100 CAPSULE ORAL 3 TIMES DAILY PRN
Qty: 30 CAPSULE | Refills: 0 | Status: SHIPPED | OUTPATIENT
Start: 2024-04-19

## 2024-04-19 RX ORDER — DEXTROMETHORPHAN HYDROBROMIDE AND PROMETHAZINE HYDROCHLORIDE 15; 6.25 MG/5ML; MG/5ML
5 SYRUP ORAL EVERY 6 HOURS PRN
Qty: 120 ML | Refills: 0 | Status: SHIPPED | OUTPATIENT
Start: 2024-04-19 | End: 2024-04-26

## 2024-04-19 RX ORDER — DEXAMETHASONE SODIUM PHOSPHATE 10 MG/ML
10 INJECTION INTRAMUSCULAR; INTRAVENOUS ONCE
Status: COMPLETED | OUTPATIENT
Start: 2024-04-19 | End: 2024-04-19

## 2024-04-19 RX ADMIN — DEXAMETHASONE SODIUM PHOSPHATE 10 MG: 10 INJECTION INTRAMUSCULAR; INTRAVENOUS at 11:00

## 2024-04-19 ASSESSMENT — FIBROSIS 4 INDEX: FIB4 SCORE: 0.5

## 2024-04-19 ASSESSMENT — ENCOUNTER SYMPTOMS
DIARRHEA: 0
FEVER: 1
SWOLLEN GLANDS: 0
WHEEZING: 0
HEMOPTYSIS: 0
COUGH: 1
SHORTNESS OF BREATH: 0
SPUTUM PRODUCTION: 0
VOMITING: 0
NAUSEA: 0
SORE THROAT: 1

## 2024-04-19 NOTE — PROGRESS NOTES
Subjective:     Audrey Leyva is a 39 y.o. female who presents for Pharyngitis (For about 4 days now has been having a fever, sore throat, lost of voice, nose congestion, burning in the chest when it first started, when coughing chest feels tight, body aches, )      Symptoms started on Wednesday. Dry cough. Daughter was dx with Bronchitis.    Pharyngitis   This is a new problem. The pain is at a severity of 5/10. Associated symptoms include congestion, coughing and ear pain. Pertinent negatives include no diarrhea, drooling, ear discharge, shortness of breath, swollen glands or vomiting. Treatments tried: Theraflu.       No past medical history on file.    Past Surgical History:   Procedure Laterality Date    CHOLECYSTECTOMY         Social History     Socioeconomic History    Marital status: Single     Spouse name: Not on file    Number of children: Not on file    Years of education: Not on file    Highest education level: Not on file   Occupational History    Not on file   Tobacco Use    Smoking status: Former     Current packs/day: 0.00     Types: Cigarettes     Quit date: 2018     Years since quittin.3    Smokeless tobacco: Never   Vaping Use    Vaping Use: Never used   Substance and Sexual Activity    Alcohol use: Not Currently    Drug use: No    Sexual activity: Not Currently   Other Topics Concern    Not on file   Social History Narrative    Not on file     Social Determinants of Health     Financial Resource Strain: Not on file   Food Insecurity: Not on file   Transportation Needs: Not on file   Physical Activity: Not on file   Stress: Not on file   Social Connections: Not on file   Intimate Partner Violence: Not on file   Housing Stability: Not on file        No family history on file.     No Known Allergies    Review of Systems   Constitutional:  Positive for fever and malaise/fatigue.   HENT:  Positive for congestion, ear pain and sore throat. Negative for drooling and ear discharge.   "  Respiratory:  Positive for cough. Negative for hemoptysis, sputum production, shortness of breath and wheezing.    Gastrointestinal:  Negative for diarrhea, nausea and vomiting.   All other systems reviewed and are negative.       Objective:   /66 (BP Location: Left arm, Patient Position: Sitting, BP Cuff Size: Adult long)   Pulse (!) 109   Temp 35.9 °C (96.7 °F) (Temporal)   Resp 18   Ht 1.676 m (5' 6\")   Wt 106 kg (234 lb 3.2 oz)   SpO2 97%   BMI 37.80 kg/m²     Physical Exam  Vitals reviewed.   Constitutional:       General: She is not in acute distress.     Appearance: She is well-developed. She is ill-appearing.   HENT:      Head: Normocephalic and atraumatic.      Right Ear: Ear canal and external ear normal. No drainage or swelling. A middle ear effusion is present. No hemotympanum. Tympanic membrane is not injected, perforated or erythematous.      Left Ear: Ear canal and external ear normal. No drainage or swelling. A middle ear effusion is present. No hemotympanum. Tympanic membrane is not injected, perforated or erythematous.      Nose: Rhinorrhea present.      Mouth/Throat:      Lips: Pink.      Mouth: Mucous membranes are moist. No oral lesions.      Pharynx: Posterior oropharyngeal erythema present. No oropharyngeal exudate.      Tonsils: No tonsillar exudate or tonsillar abscesses.   Eyes:      Conjunctiva/sclera: Conjunctivae normal.   Cardiovascular:      Rate and Rhythm: Normal rate.   Pulmonary:      Effort: Pulmonary effort is normal. No respiratory distress.      Breath sounds: Normal breath sounds. No stridor. No wheezing, rhonchi or rales.   Musculoskeletal:      Cervical back: Neck supple.   Lymphadenopathy:      Cervical: No cervical adenopathy.   Skin:     General: Skin is warm and dry.      Findings: No rash.   Neurological:      Mental Status: She is alert and oriented to person, place, and time.      GCS: GCS eye subscore is 4. GCS verbal subscore is 5. GCS motor subscore " is 6.   Psychiatric:         Speech: Speech normal.         Behavior: Behavior normal.         Thought Content: Thought content normal.         Judgment: Judgment normal.         Assessment/Plan:   1. Viral respiratory infection  - POCT CoV-2, Flu A/B, RSV by PCR  - benzonatate (TESSALON) 100 MG Cap; Take 1 Capsule by mouth 3 times a day as needed for Cough.  Dispense: 30 Capsule; Refill: 0  - promethazine-dextromethorphan (PROMETHAZINE-DM) 6.25-15 MG/5ML syrup; Take 5 mL by mouth every 6 hours as needed for Cough for up to 7 days.  Dispense: 120 mL; Refill: 0    2. Laryngitis  - dexamethasone (Decadron) injection (check route below) 10 mg    Symptomatic care.  -Oral hydration and rest.   -Cough control: nonpharmacologic options for cough relief such as throat lozenges, hot tea, honey.  -Tylenol or ibuprofen for pain and fever as directed.   -Warm salt water gargles.  -OTC Throat lozenges or spray (Cepacol).  -Sudafed or antihistamines for ears.    Seek emergency medical care immediately for: Trouble breathing, persistent pain or pressure in the chest, confusion, inability to wake or stay awake, bluish lips or face, persistent tachycardia (fast heart rate), prolonged dizziness, persistent high grade fevers. Follow up for prolonged cough, persistent wheezing, persistent throat pain, difficulty swallowing, persistent fevers, leg swelling, persistent earache, or any other concerns. Follow up with your Primary Care Provider.     -Discussed viral etiology, symptomatic care, and S&S of PNA with follow up. Stable Vitals.    Differential diagnosis, natural history, supportive care, and indications for immediate follow-up discussed.

## 2024-04-19 NOTE — PATIENT INSTRUCTIONS
Symptomatic care.  -Oral hydration and rest.   -Cough control: nonpharmacologic options for cough relief such as throat lozenges, hot tea, honey.  -Tylenol or ibuprofen for pain and fever as directed.   -Warm salt water gargles.  -OTC Throat lozenges or spray (Cepacol).  -Sudafed or antihistamines for ears.    Seek emergency medical care immediately for: Trouble breathing, persistent pain or pressure in the chest, confusion, inability to wake or stay awake, bluish lips or face, persistent tachycardia (fast heart rate), prolonged dizziness, persistent high grade fevers. Follow up for prolonged cough, persistent wheezing, persistent throat pain, difficulty swallowing, persistent fevers, leg swelling, persistent earache, or any other concerns. Follow up with your Primary Care Provider.

## 2024-04-19 NOTE — LETTER
April 19, 2024         Patient: Audrey Leyva   YOB: 1985   Date of Visit: 4/19/2024           To Whom it May Concern:    Audrey Leyva was seen in my clinic on 4/19/2024. She may return to work on 4/21/2024.    If you have any questions or concerns, please don't hesitate to call.        Sincerely,           DAWSON Jackson.  Electronically Signed

## 2024-06-10 ENCOUNTER — APPOINTMENT (OUTPATIENT)
Dept: RADIOLOGY | Facility: MEDICAL CENTER | Age: 39
End: 2024-06-10
Attending: EMERGENCY MEDICINE
Payer: COMMERCIAL

## 2024-06-10 ENCOUNTER — HOSPITAL ENCOUNTER (OUTPATIENT)
Facility: MEDICAL CENTER | Age: 39
End: 2024-06-11
Attending: EMERGENCY MEDICINE | Admitting: STUDENT IN AN ORGANIZED HEALTH CARE EDUCATION/TRAINING PROGRAM
Payer: COMMERCIAL

## 2024-06-10 DIAGNOSIS — R42 DIZZINESS: ICD-10-CM

## 2024-06-10 DIAGNOSIS — R42 VERTIGO: ICD-10-CM

## 2024-06-10 DIAGNOSIS — R51.9 ACUTE NONINTRACTABLE HEADACHE, UNSPECIFIED HEADACHE TYPE: ICD-10-CM

## 2024-06-10 LAB
ANION GAP SERPL CALC-SCNC: 13 MMOL/L (ref 7–16)
BASOPHILS # BLD AUTO: 0.3 % (ref 0–1.8)
BASOPHILS # BLD: 0.04 K/UL (ref 0–0.12)
BUN SERPL-MCNC: 13 MG/DL (ref 8–22)
CALCIUM SERPL-MCNC: 8.5 MG/DL (ref 8.5–10.5)
CHLORIDE SERPL-SCNC: 104 MMOL/L (ref 96–112)
CO2 SERPL-SCNC: 18 MMOL/L (ref 20–33)
CREAT SERPL-MCNC: 0.59 MG/DL (ref 0.5–1.4)
EKG IMPRESSION: NORMAL
EOSINOPHIL # BLD AUTO: 0.09 K/UL (ref 0–0.51)
EOSINOPHIL NFR BLD: 0.8 % (ref 0–6.9)
ERYTHROCYTE [DISTWIDTH] IN BLOOD BY AUTOMATED COUNT: 37.5 FL (ref 35.9–50)
GFR SERPLBLD CREATININE-BSD FMLA CKD-EPI: 118 ML/MIN/1.73 M 2
GLUCOSE SERPL-MCNC: 109 MG/DL (ref 65–99)
HCG SERPL QL: NEGATIVE
HCT VFR BLD AUTO: 42.6 % (ref 37–47)
HGB BLD-MCNC: 14.5 G/DL (ref 12–16)
IMM GRANULOCYTES # BLD AUTO: 0.02 K/UL (ref 0–0.11)
IMM GRANULOCYTES NFR BLD AUTO: 0.2 % (ref 0–0.9)
LYMPHOCYTES # BLD AUTO: 2.56 K/UL (ref 1–4.8)
LYMPHOCYTES NFR BLD: 22.4 % (ref 22–41)
MCH RBC QN AUTO: 27.4 PG (ref 27–33)
MCHC RBC AUTO-ENTMCNC: 34 G/DL (ref 32.2–35.5)
MCV RBC AUTO: 80.5 FL (ref 81.4–97.8)
MONOCYTES # BLD AUTO: 0.81 K/UL (ref 0–0.85)
MONOCYTES NFR BLD AUTO: 7.1 % (ref 0–13.4)
NEUTROPHILS # BLD AUTO: 7.92 K/UL (ref 1.82–7.42)
NEUTROPHILS NFR BLD: 69.2 % (ref 44–72)
NRBC # BLD AUTO: 0 K/UL
NRBC BLD-RTO: 0 /100 WBC (ref 0–0.2)
PLATELET # BLD AUTO: 285 K/UL (ref 164–446)
PMV BLD AUTO: 10.9 FL (ref 9–12.9)
POTASSIUM SERPL-SCNC: 3.6 MMOL/L (ref 3.6–5.5)
RBC # BLD AUTO: 5.29 M/UL (ref 4.2–5.4)
SODIUM SERPL-SCNC: 135 MMOL/L (ref 135–145)
WBC # BLD AUTO: 11.4 K/UL (ref 4.8–10.8)

## 2024-06-10 PROCEDURE — 700111 HCHG RX REV CODE 636 W/ 250 OVERRIDE (IP): Performed by: EMERGENCY MEDICINE

## 2024-06-10 PROCEDURE — 85025 COMPLETE CBC W/AUTO DIFF WBC: CPT

## 2024-06-10 PROCEDURE — 36415 COLL VENOUS BLD VENIPUNCTURE: CPT

## 2024-06-10 PROCEDURE — 84703 CHORIONIC GONADOTROPIN ASSAY: CPT

## 2024-06-10 PROCEDURE — 70450 CT HEAD/BRAIN W/O DYE: CPT

## 2024-06-10 PROCEDURE — 93005 ELECTROCARDIOGRAM TRACING: CPT

## 2024-06-10 PROCEDURE — 99285 EMERGENCY DEPT VISIT HI MDM: CPT

## 2024-06-10 PROCEDURE — 700105 HCHG RX REV CODE 258: Performed by: EMERGENCY MEDICINE

## 2024-06-10 PROCEDURE — 96374 THER/PROPH/DIAG INJ IV PUSH: CPT

## 2024-06-10 PROCEDURE — 80048 BASIC METABOLIC PNL TOTAL CA: CPT

## 2024-06-10 PROCEDURE — 93005 ELECTROCARDIOGRAM TRACING: CPT | Performed by: EMERGENCY MEDICINE

## 2024-06-10 PROCEDURE — 700102 HCHG RX REV CODE 250 W/ 637 OVERRIDE(OP): Performed by: EMERGENCY MEDICINE

## 2024-06-10 PROCEDURE — A9270 NON-COVERED ITEM OR SERVICE: HCPCS | Performed by: EMERGENCY MEDICINE

## 2024-06-10 RX ORDER — ONDANSETRON 2 MG/ML
4 INJECTION INTRAMUSCULAR; INTRAVENOUS ONCE
Status: COMPLETED | OUTPATIENT
Start: 2024-06-10 | End: 2024-06-10

## 2024-06-10 RX ORDER — SODIUM CHLORIDE 9 MG/ML
1000 INJECTION, SOLUTION INTRAVENOUS ONCE
Status: COMPLETED | OUTPATIENT
Start: 2024-06-10 | End: 2024-06-11

## 2024-06-10 RX ORDER — MECLIZINE HYDROCHLORIDE 25 MG/1
25 TABLET ORAL ONCE
Status: COMPLETED | OUTPATIENT
Start: 2024-06-10 | End: 2024-06-10

## 2024-06-10 RX ADMIN — MECLIZINE HYDROCHLORIDE 25 MG: 25 TABLET ORAL at 22:17

## 2024-06-10 RX ADMIN — SODIUM CHLORIDE 1000 ML: 9 INJECTION, SOLUTION INTRAVENOUS at 22:21

## 2024-06-10 RX ADMIN — ONDANSETRON 4 MG: 2 INJECTION INTRAMUSCULAR; INTRAVENOUS at 22:30

## 2024-06-10 ASSESSMENT — FIBROSIS 4 INDEX: FIB4 SCORE: 0.5

## 2024-06-11 ENCOUNTER — APPOINTMENT (OUTPATIENT)
Dept: RADIOLOGY | Facility: MEDICAL CENTER | Age: 39
End: 2024-06-11
Attending: STUDENT IN AN ORGANIZED HEALTH CARE EDUCATION/TRAINING PROGRAM
Payer: COMMERCIAL

## 2024-06-11 ENCOUNTER — PHARMACY VISIT (OUTPATIENT)
Dept: PHARMACY | Facility: MEDICAL CENTER | Age: 39
End: 2024-06-11
Payer: COMMERCIAL

## 2024-06-11 VITALS
TEMPERATURE: 98.1 F | WEIGHT: 231.04 LBS | OXYGEN SATURATION: 96 % | HEART RATE: 99 BPM | BODY MASS INDEX: 37.13 KG/M2 | RESPIRATION RATE: 14 BRPM | HEIGHT: 66 IN | DIASTOLIC BLOOD PRESSURE: 60 MMHG | SYSTOLIC BLOOD PRESSURE: 109 MMHG

## 2024-06-11 PROBLEM — R42 DIZZINESS: Status: RESOLVED | Noted: 2024-06-11 | Resolved: 2024-06-11

## 2024-06-11 PROBLEM — R42 DIZZINESS: Status: ACTIVE | Noted: 2024-06-11

## 2024-06-11 LAB
ANION GAP SERPL CALC-SCNC: 11 MMOL/L (ref 7–16)
BUN SERPL-MCNC: 12 MG/DL (ref 8–22)
CALCIUM SERPL-MCNC: 8.5 MG/DL (ref 8.5–10.5)
CHLORIDE SERPL-SCNC: 106 MMOL/L (ref 96–112)
CO2 SERPL-SCNC: 20 MMOL/L (ref 20–33)
CREAT SERPL-MCNC: 0.58 MG/DL (ref 0.5–1.4)
ERYTHROCYTE [DISTWIDTH] IN BLOOD BY AUTOMATED COUNT: 37.6 FL (ref 35.9–50)
GFR SERPLBLD CREATININE-BSD FMLA CKD-EPI: 119 ML/MIN/1.73 M 2
GLUCOSE SERPL-MCNC: 87 MG/DL (ref 65–99)
HCT VFR BLD AUTO: 39.3 % (ref 37–47)
HGB BLD-MCNC: 13.5 G/DL (ref 12–16)
MCH RBC QN AUTO: 27.2 PG (ref 27–33)
MCHC RBC AUTO-ENTMCNC: 34.4 G/DL (ref 32.2–35.5)
MCV RBC AUTO: 79.2 FL (ref 81.4–97.8)
PLATELET # BLD AUTO: 243 K/UL (ref 164–446)
PMV BLD AUTO: 10.3 FL (ref 9–12.9)
POTASSIUM SERPL-SCNC: 3.6 MMOL/L (ref 3.6–5.5)
RBC # BLD AUTO: 4.96 M/UL (ref 4.2–5.4)
SODIUM SERPL-SCNC: 137 MMOL/L (ref 135–145)
WBC # BLD AUTO: 8.1 K/UL (ref 4.8–10.8)

## 2024-06-11 PROCEDURE — G0378 HOSPITAL OBSERVATION PER HR: HCPCS

## 2024-06-11 PROCEDURE — 70551 MRI BRAIN STEM W/O DYE: CPT

## 2024-06-11 PROCEDURE — 700102 HCHG RX REV CODE 250 W/ 637 OVERRIDE(OP): Performed by: STUDENT IN AN ORGANIZED HEALTH CARE EDUCATION/TRAINING PROGRAM

## 2024-06-11 PROCEDURE — 700111 HCHG RX REV CODE 636 W/ 250 OVERRIDE (IP): Performed by: EMERGENCY MEDICINE

## 2024-06-11 PROCEDURE — A9270 NON-COVERED ITEM OR SERVICE: HCPCS | Performed by: STUDENT IN AN ORGANIZED HEALTH CARE EDUCATION/TRAINING PROGRAM

## 2024-06-11 PROCEDURE — RXMED WILLOW AMBULATORY MEDICATION CHARGE

## 2024-06-11 PROCEDURE — 96376 TX/PRO/DX INJ SAME DRUG ADON: CPT

## 2024-06-11 PROCEDURE — A9270 NON-COVERED ITEM OR SERVICE: HCPCS

## 2024-06-11 PROCEDURE — 80048 BASIC METABOLIC PNL TOTAL CA: CPT

## 2024-06-11 PROCEDURE — 700111 HCHG RX REV CODE 636 W/ 250 OVERRIDE (IP): Mod: JZ

## 2024-06-11 PROCEDURE — 85027 COMPLETE CBC AUTOMATED: CPT

## 2024-06-11 PROCEDURE — 96375 TX/PRO/DX INJ NEW DRUG ADDON: CPT

## 2024-06-11 PROCEDURE — 99223 1ST HOSP IP/OBS HIGH 75: CPT | Performed by: STUDENT IN AN ORGANIZED HEALTH CARE EDUCATION/TRAINING PROGRAM

## 2024-06-11 PROCEDURE — 700102 HCHG RX REV CODE 250 W/ 637 OVERRIDE(OP)

## 2024-06-11 PROCEDURE — 700111 HCHG RX REV CODE 636 W/ 250 OVERRIDE (IP): Mod: JZ | Performed by: STUDENT IN AN ORGANIZED HEALTH CARE EDUCATION/TRAINING PROGRAM

## 2024-06-11 RX ORDER — MECLIZINE HYDROCHLORIDE 25 MG/1
25 TABLET ORAL 3 TIMES DAILY
Status: DISCONTINUED | OUTPATIENT
Start: 2024-06-11 | End: 2024-06-11 | Stop reason: HOSPADM

## 2024-06-11 RX ORDER — DIAZEPAM 5 MG/ML
5 INJECTION, SOLUTION INTRAMUSCULAR; INTRAVENOUS ONCE
Status: COMPLETED | OUTPATIENT
Start: 2024-06-11 | End: 2024-06-11

## 2024-06-11 RX ORDER — ONDANSETRON 2 MG/ML
4 INJECTION INTRAMUSCULAR; INTRAVENOUS EVERY 4 HOURS PRN
Status: DISCONTINUED | OUTPATIENT
Start: 2024-06-11 | End: 2024-06-11 | Stop reason: HOSPADM

## 2024-06-11 RX ORDER — ACETAMINOPHEN 325 MG/1
650 TABLET ORAL EVERY 4 HOURS PRN
Status: DISCONTINUED | OUTPATIENT
Start: 2024-06-11 | End: 2024-06-11 | Stop reason: HOSPADM

## 2024-06-11 RX ORDER — MECLIZINE HYDROCHLORIDE 25 MG/1
25 TABLET ORAL 3 TIMES DAILY
Qty: 15 TABLET | Refills: 0 | Status: SHIPPED | OUTPATIENT
Start: 2024-06-11 | End: 2024-06-16

## 2024-06-11 RX ORDER — DIAZEPAM 5 MG/ML
2.5 INJECTION, SOLUTION INTRAMUSCULAR; INTRAVENOUS
Status: COMPLETED | OUTPATIENT
Start: 2024-06-11 | End: 2024-06-11

## 2024-06-11 RX ADMIN — DIAZEPAM 5 MG: 10 INJECTION, SOLUTION INTRAMUSCULAR; INTRAVENOUS at 01:45

## 2024-06-11 RX ADMIN — DIAZEPAM 2.5 MG: 5 INJECTION, SOLUTION INTRAMUSCULAR; INTRAVENOUS at 08:43

## 2024-06-11 RX ADMIN — MECLIZINE HYDROCHLORIDE 25 MG: 25 TABLET ORAL at 10:23

## 2024-06-11 RX ADMIN — ACETAMINOPHEN 650 MG: 325 TABLET, FILM COATED ORAL at 03:51

## 2024-06-11 RX ADMIN — DIAZEPAM 5 MG: 5 INJECTION, SOLUTION INTRAMUSCULAR; INTRAVENOUS at 12:46

## 2024-06-11 SDOH — ECONOMIC STABILITY: TRANSPORTATION INSECURITY
IN THE PAST 12 MONTHS, HAS LACK OF RELIABLE TRANSPORTATION KEPT YOU FROM MEDICAL APPOINTMENTS, MEETINGS, WORK OR FROM GETTING THINGS NEEDED FOR DAILY LIVING?: NO

## 2024-06-11 SDOH — ECONOMIC STABILITY: TRANSPORTATION INSECURITY
IN THE PAST 12 MONTHS, HAS THE LACK OF TRANSPORTATION KEPT YOU FROM MEDICAL APPOINTMENTS OR FROM GETTING MEDICATIONS?: NO

## 2024-06-11 ASSESSMENT — SOCIAL DETERMINANTS OF HEALTH (SDOH)

## 2024-06-11 ASSESSMENT — LIFESTYLE VARIABLES
EVER HAD A DRINK FIRST THING IN THE MORNING TO STEADY YOUR NERVES TO GET RID OF A HANGOVER: NO
HAVE YOU EVER FELT YOU SHOULD CUT DOWN ON YOUR DRINKING: NO
EVER FELT BAD OR GUILTY ABOUT YOUR DRINKING: NO
ON A TYPICAL DAY WHEN YOU DRINK ALCOHOL HOW MANY DRINKS DO YOU HAVE: 0
HAVE PEOPLE ANNOYED YOU BY CRITICIZING YOUR DRINKING: NO
TOTAL SCORE: 0
HAVE PEOPLE ANNOYED YOU BY CRITICIZING YOUR DRINKING: NO
HAVE YOU EVER FELT YOU SHOULD CUT DOWN ON YOUR DRINKING: NO
CONSUMPTION TOTAL: INCOMPLETE
ALCOHOL_USE: NO
TOTAL SCORE: 0
EVER FELT BAD OR GUILTY ABOUT YOUR DRINKING: NO
ALCOHOL_USE: NO
HOW MANY TIMES IN THE PAST YEAR HAVE YOU HAD 5 OR MORE DRINKS IN A DAY: 0
AVERAGE NUMBER OF DAYS PER WEEK YOU HAVE A DRINK CONTAINING ALCOHOL: 0
DOES PATIENT WANT TO STOP DRINKING: NO
ON A TYPICAL DAY WHEN YOU DRINK ALCOHOL HOW MANY DRINKS DO YOU HAVE: 1
CONSUMPTION TOTAL: NEGATIVE
TOTAL SCORE: 0
AVERAGE NUMBER OF DAYS PER WEEK YOU HAVE A DRINK CONTAINING ALCOHOL: 0
HOW MANY TIMES IN THE PAST YEAR HAVE YOU HAD 5 OR MORE DRINKS IN A DAY: 0
DOES PATIENT WANT TO STOP DRINKING: NO

## 2024-06-11 ASSESSMENT — ENCOUNTER SYMPTOMS: DIZZINESS: 1

## 2024-06-11 ASSESSMENT — PAIN DESCRIPTION - PAIN TYPE: TYPE: ACUTE PAIN

## 2024-06-11 ASSESSMENT — FIBROSIS 4 INDEX: FIB4 SCORE: 0.52

## 2024-06-11 NOTE — ED NOTES
Report given to ARLETTE Solomon.   Lip Wedge Excision Repair Text: Given the location of the defect and the proximity to free margins a full thickness wedge repair was deemed most appropriate.  Using a sterile surgical marker, the appropriate repair was drawn incorporating the defect and placing the expected incisions perpendicular to the vermilion border.  The vermilion border was also meticulously outlined to ensure appropriate reapproximation during the repair.  The area thus outlined was incised through and through with a #15 scalpel blade.  The muscularis and dermis were reaproximated with deep sutures following hemostasis. Care was taken to realign the vermilion border before proceeding with the superficial closure.  Once the vermilion was realigned the superfical and mucosal closure was finished.

## 2024-06-11 NOTE — PROGRESS NOTES
Pt ambulate to restroom with standby assist, pt tolerated well. Pt states some dizziness with ambulation but states it is improved from when she was admitted.

## 2024-06-11 NOTE — DISCHARGE SUMMARY
Discharge Summary    CHIEF COMPLAINT ON ADMISSION  Chief Complaint   Patient presents with    Dizziness     Sudden onset of dizziness and near syncope.    Hypotension     After dizziness pt checked BP, systolic in the 90s.    Nausea       Reason for Admission  ems     Admission Date  6/10/2024    CODE STATUS  Full Code    HPI & HOSPITAL COURSE  Audrey Leyva is a 39 y.o. female with a past medical history of depression, ozempic for weight loss who presented 6/10/2024 with sudden onset of dizziness.     The patient states that her dizziness started earlier this evening with no triggering event.  Denies any focal neurological weakness or slurred speech.  Reports occasional nausea.  Does not take any blood thinners.    In the ER heart rate significant for 117, sinus tachycardia. Labs significant for WBC 11.4.  CO2 18. CT of the head without contrast was negative for acute intracranial abnormality. Beta-hCG was negative.    Patient seen and examined this a.m.  She states that she is still having dizziness with no relieving factors.  I started her on scheduled meclizine and a dose of IV Valium. I discussed with her the results of the MRI of the brain is within normal limits and head CT without shows no acute intracranial abnormalities.  Patient reexamined this afternoon and she states that her dizziness is significantly better.     I discussed with her that at this time we will go ahead and discharge her with a prescription for meclizine 25 mg 3 times daily to be taken for symptom control of her dizziness.  I additionally will be sending her with a referral to neurology for further workup and management of her dizziness since it has not fully resolved at this time. Patient expresses understanding.  All questions at this time.  Okay to discharge.    Therefore, she is discharged in good and stable condition to home with close outpatient follow-up.    The patient recovered much more quickly than anticipated on  admission.    Discharge Date  06/11/24    FOLLOW UP ITEMS POST DISCHARGE  Discharge Instructions per DAWSON Mayberry.     -You are being prescribed meclizine 25 mg p.o. 3 times daily to be taken for your dizziness.  -Referral placed to neurology for further management and workup of the dizziness.  -Follow-up with your PCP status post hospitalization for further follow-up.     DIET: As tolerated     ACTIVITY: As tolerated     DIAGNOSIS: Dizziness     Return to ER if you start to experience any numbness and tingling, chest pain, palpitations, shortness of breath.    DISCHARGE DIAGNOSES  Principal Problem (Resolved):    Dizziness (POA: Yes)  Active Problems:    Obesity (BMI 35.0-39.9 without comorbidity) (HCC) (POA: Yes)      FOLLOW UP  SISSY Holland  [address] [facility] [id number] [phone]    Call in 1 week(s)      MEDICATIONS ON DISCHARGE     Medication List        START taking these medications        Instructions   meclizine 25 MG Tabs  Commonly known as: Antivert   Take 1 Tablet by mouth in the morning, at noon, and at bedtime for 5 days.  Dose: 25 mg            CONTINUE taking these medications        Instructions   Tirzepatide 2.5 MG/0.5ML Sopn   Inject  under the skin.            STOP taking these medications      benzonatate 100 MG Caps  Commonly known as: Tessalon              Allergies  No Known Allergies    DIET  Orders Placed This Encounter   Procedures    Diet Order Diet: Regular     Standing Status:   Standing     Number of Occurrences:   1     Order Specific Question:   Diet:     Answer:   Regular [1]       ACTIVITY  As tolerated.  Weight bearing as tolerated    CONSULTATIONS  None    PROCEDURES  None    LABORATORY  Lab Results   Component Value Date    SODIUM 137 06/11/2024    POTASSIUM 3.6 06/11/2024    CHLORIDE 106 06/11/2024    CO2 20 06/11/2024    GLUCOSE 87 06/11/2024    BUN 12 06/11/2024    CREATININE 0.58 06/11/2024        Lab Results   Component Value  Date    WBC 8.1 06/11/2024    HEMOGLOBIN 13.5 06/11/2024    HEMATOCRIT 39.3 06/11/2024    PLATELETCT 243 06/11/2024        Jackelyn FREED A.P.R.N. performed a substantiated portion of the service face-to-face with same patient on the same date of service INDEPENDENTLY FROM THE MD ON ASSESSMENT, EXAMINATION, AND DISCUSSION IN PLAN OF CARE FOR 18 MINUTES.  I was personally involved in reviewing and conducting the medical decision making, including the information as described below:

## 2024-06-11 NOTE — ED PROVIDER NOTES
ED Provider Note    CHIEF COMPLAINT  Chief Complaint   Patient presents with    Dizziness     Sudden onset of dizziness and near syncope.    Hypotension     After dizziness pt checked BP, systolic in the 90s.    Nausea       EXTERNAL RECORDS REVIEWED  Outpatient Notes 4/19/2024 urgent care for pharyngitis, cough.  URI exposure.  Diagnosed viral respiratory infection, laryngitis, given Tessalon and Decadron.  3/6/2024 for tongue lesion, left lower quadrant abdominal pain.  2/5/2024 for acute bacterial sinusitis, conjunctivitis given Augmentin and Polytrim.  1/9/2024 for obesity, thyroid nodule.  At ultrasound.  Prediabetes, chronic, stable.  Monitor labs.  Anti-TPO antibodies present, thyroid peroxidase added.     HPI/ROS  LIMITATION TO HISTORY   Select: : None  OUTSIDE HISTORIAN(S):  None Sister at bedside but does not contribute to history    Audrey Leyva is a 39 y.o. female who presents to the emergency department by ambulance from home for dizziness.  Patient had sudden onset dizziness, lightheaded but also room spinning while resting on the couch.  Got up and walked to the bathroom but symptoms worsened.  Worse with head movements.  Patient thought it may be because she had not eaten or drank much today, ate something, drink water but did not feel better.  Lay back on the couch but symptoms persisted.  Noted that she had systolic blood pressure in the 90s during this time.  Nauseous but no vomiting.  Patient is taking tirzepatde, previously on Ozempic.    No visual changes, slurred speech, focal weakness or paresthesias.  No neck pain or stiffness.  She does have some head pressure discomfort.    PAST MEDICAL HISTORY       SURGICAL HISTORY   has a past surgical history that includes cholecystectomy.    FAMILY HISTORY  History reviewed. No pertinent family history.    SOCIAL HISTORY  Social History     Tobacco Use    Smoking status: Former     Current packs/day: 0.00     Types: Cigarettes     Quit date:  "2018     Years since quittin.4    Smokeless tobacco: Never   Vaping Use    Vaping status: Never Used   Substance and Sexual Activity    Alcohol use: Not Currently    Drug use: No    Sexual activity: Not Currently       CURRENT MEDICATIONS  Home Medications       Reviewed by Ezio Rousseau R.N. (Registered Nurse) on 06/10/24 at 2143  Med List Status: Not Addressed     Medication Last Dose Status   benzonatate (TESSALON) 100 MG Cap  Active   Tirzepatide 2.5 MG/0.5ML Solution Pen-injector  Active                  Audit from Redirected Encounters    **Home medications have not yet been reviewed for this encounter**         ALLERGIES  No Known Allergies    PHYSICAL EXAM  VITAL SIGNS: /57   Pulse (!) 106   Temp 37 °C (98.6 °F) (Temporal)   Resp 16   Ht 1.676 m (5' 5.98\")   Wt 103 kg (228 lb)   LMP 06/10/2024 (Exact Date)   SpO2 97%   BMI 36.82 kg/m²    Pulse ox interpretation: I interpret this pulse ox as normal.  Constitutional: Alert in no apparent distress.  HENT: Normocephalic, atraumatic. Bilateral external ears normal, Nose normal. Moist mucous membranes.    Eyes: Pupils are equal and reactive, Conjunctiva normal.  EOMI, no nystagmus.  Neck: Normal range of motion, supple.  No meningeal irritation.  Inducible vertigo with head movements  Cardiovascular: Regular rate and rhythm, no murmurs. Distal pulses intact.   Thorax & Lungs: Normal breath sounds.  No wheezing/rales/ronchi. No increased work of breathing  Abdomen: Soft, non-distended, non-tender to palpation.  Skin: Warm, Dry, No erythema, No rash.   Musculoskeletal: Good range of motion in all major joints.   Neurologic: Alert and oriented x 4.  Speech clear and cohesive.  Cranial nerves II through XII intact bilaterally.  5 out of 5 strength in 4 extremities with proximal and distal muscle groups.  Pronator drift without upper extremity drift the patient does have some truncal ataxia.  Straight leg raise intact bilaterally, heel-to-shin " intact bilaterally..  Psychiatric: Affect normal, Judgment normal, Mood normal.       EKG/LABS  Results for orders placed or performed during the hospital encounter of 06/10/24   CBC WITH DIFFERENTIAL   Result Value Ref Range    WBC 11.4 (H) 4.8 - 10.8 K/uL    RBC 5.29 4.20 - 5.40 M/uL    Hemoglobin 14.5 12.0 - 16.0 g/dL    Hematocrit 42.6 37.0 - 47.0 %    MCV 80.5 (L) 81.4 - 97.8 fL    MCH 27.4 27.0 - 33.0 pg    MCHC 34.0 32.2 - 35.5 g/dL    RDW 37.5 35.9 - 50.0 fL    Platelet Count 285 164 - 446 K/uL    MPV 10.9 9.0 - 12.9 fL    Neutrophils-Polys 69.20 44.00 - 72.00 %    Lymphocytes 22.40 22.00 - 41.00 %    Monocytes 7.10 0.00 - 13.40 %    Eosinophils 0.80 0.00 - 6.90 %    Basophils 0.30 0.00 - 1.80 %    Immature Granulocytes 0.20 0.00 - 0.90 %    Nucleated RBC 0.00 0.00 - 0.20 /100 WBC    Neutrophils (Absolute) 7.92 (H) 1.82 - 7.42 K/uL    Lymphs (Absolute) 2.56 1.00 - 4.80 K/uL    Monos (Absolute) 0.81 0.00 - 0.85 K/uL    Eos (Absolute) 0.09 0.00 - 0.51 K/uL    Baso (Absolute) 0.04 0.00 - 0.12 K/uL    Immature Granulocytes (abs) 0.02 0.00 - 0.11 K/uL    NRBC (Absolute) 0.00 K/uL   BASIC METABOLIC PANEL   Result Value Ref Range    Sodium 135 135 - 145 mmol/L    Potassium 3.6 3.6 - 5.5 mmol/L    Chloride 104 96 - 112 mmol/L    Co2 18 (L) 20 - 33 mmol/L    Glucose 109 (H) 65 - 99 mg/dL    Bun 13 8 - 22 mg/dL    Creatinine 0.59 0.50 - 1.40 mg/dL    Calcium 8.5 8.5 - 10.5 mg/dL    Anion Gap 13.0 7.0 - 16.0   HCG QUAL SERUM   Result Value Ref Range    Beta-Hcg Qualitative Serum Negative Negative   ESTIMATED GFR   Result Value Ref Range    GFR (CKD-EPI) 118 >60 mL/min/1.73 m 2   EKG   Result Value Ref Range    Report       [facility] Emergency Dept.    Test Date:  2024-06-10  Pt Name:    ALYSSA PHILLIPS               Department: ER  MRN:        [id number]                      Room:       [facility]  Gender:     Female                       Technician: [id number]  :        1985                   Requested  By:ER TRIAGE PROTOCOL  Order #:    [id number]                    Reading MD: BENITA CASTANEDA DO    Measurements  Intervals                                Axis  Rate:       118                          P:          62  NJ:         138                          QRS:        104  QRSD:       104                          T:          30  QT:         343  QTc:        481    Interpretive Statements  Sinus tachycardia  Low voltage, precordial leads  Consider right ventricular hypertrophy  Compared to ECG 11/12/2023 06:50:02  Low QRS voltage now present  Right-axis deviation no longer present  Electronically Signed On 06- 22:06:15 PDT by BENITA CASTANEDA DO       I have independently interpreted this EKG    RADIOLOGY/PROCEDURES   Radiologist interpretation:  CT-HEAD W/O   Final Result         1.  No acute intracranial abnormality.             COURSE & MEDICAL DECISION MAKING    ASSESSMENT, COURSE AND PLAN  Care Narrative:   ED evaluation for sudden onset vertigo was unrevealing.  Although peripheral etiology seems more likely in the setting, she does have some truncal ataxia and neurologic testing, intractable symptoms despite meclizine.  Otherwise nonfocal.  Labs are unremarkable.  CT head within normal limits.  Valium has been added.  She will be hospitalized for restratification and MRI to exclude cerebellar insults.    ADDITIONAL PROBLEMS MANAGED      DISPOSITION AND DISCUSSIONS  I have discussed management of the patient with the following physicians and RENATA's:    1:19 AM Dr. Sheffield's with the patient and agreeable to consultation.      FINAL DIAGNOSIS  1. Vertigo    2. Acute nonintractable headache, unspecified headache type           Electronically signed by: Benita Castaneda D.O., 6/11/2024 1:17 AM

## 2024-06-11 NOTE — PROGRESS NOTES
Patient is being discharged home from the discharge lounge. Patient is A&Ox4. IV removed. Discharge instructions provided to patient and reviewed. Patient verbalized understanding and all questions and concerns were addressed. Meds to beds delivered. Patient escorted to vehicle by discharge lounge staff.

## 2024-06-11 NOTE — PROGRESS NOTES
4 Eyes Skin Assessment Completed by ARLETTE Will and ARLETTE Ibarra.    Head WDL  Ears WDL  Nose WDL  Mouth WDL  Neck WDL  Breast/Chest WDL  Shoulder Blades WDL  Spine WDL  (R) Arm/Elbow/Hand WDL  (L) Arm/Elbow/Hand WDL  Abdomen WDL  Groin WDL  Scrotum/Coccyx/Buttocks WDL  (R) Leg WDL  (L) Leg WDL  (R) Heel/Foot/Toe WDL  (L) Heel/Foot/Toe WDL          Devices In Places Tele Box      Interventions In Place N/A    Possible Skin Injury No    Pictures Uploaded Into Epic N/A  Wound Consult Placed N/A  RN Wound Prevention Protocol Ordered No

## 2024-06-11 NOTE — ED TRIAGE NOTES
"Chief Complaint   Patient presents with    Dizziness     Sudden onset of dizziness and near syncope.    Hypotension     After dizziness pt checked BP, systolic in the 90s.    Nausea     Pt BIBA for above complaints. Pt reports that she was suddenly dizzy and nauseas with near syncope.  Laid down and checked her BP and it was 99/65. Pt then took dramamine before calling EMS.  Pt A+Ox4, no pain. Pt received 4mg Zofran and 300ml NS from EMS.    /67   Pulse (!) 119   Temp 37 °C (98.6 °F) (Temporal)   Resp 16   Ht 1.676 m (5' 5.98\")   Wt 103 kg (228 lb)   LMP 06/10/2024 (Exact Date)   SpO2 100%   BMI 36.82 kg/m²     "

## 2024-06-11 NOTE — DISCHARGE INSTRUCTIONS
Discharge Instructions per DAWSON Mayberry.     -You are being prescribed meclizine 25 mg p.o. 3 times daily to be taken for your dizziness.  -Referral placed to neurology for further management and workup of the dizziness.  -Follow-up with your PCP status post hospitalization for further follow-up.     DIET: As tolerated     ACTIVITY: As tolerated     DIAGNOSIS: Dizziness     Return to ER if you start to experience any numbness and tingling, chest pain, palpitations, shortness of breath.

## 2024-06-11 NOTE — HOSPITAL COURSE
Audrey Leyva is a 39 y.o. female with a past medical history of depression, ozempic for weight loss who presented 6/10/2024 with sudden onset of dizziness.     The patient states that her dizziness started earlier this evening with no triggering event.  Denies any focal neurological weakness or slurred speech.  Reports occasional nausea.  Does not take any blood thinners.    In the ER heart rate significant for 117, sinus tachycardia. Labs significant for WBC 11.4.  CO2 18. CT of the head without contrast was negative for acute intracranial abnormality. Beta-hCG was negative.    Patient seen and examined this a.m.  She states that she is still having dizziness with no relieving factors.  I started her on scheduled meclizine and a dose of IV Valium. I discussed with her the results of the MRI of the brain is within normal limits and head CT without shows no acute intracranial abnormalities.  Patient reexamined this afternoon and she states that her dizziness is significantly better.     I discussed with her that at this time we will go ahead and discharge her with a prescription for meclizine 25 mg 3 times daily to be taken for symptom control of her dizziness.  I additionally will be sending her with a referral to neurology for further workup and management of her dizziness since it has not fully resolved at this time. Patient expresses understanding.  All questions at this time.  Okay to discharge.

## 2024-06-11 NOTE — CARE PLAN
The patient is Stable - Low risk of patient condition declining or worsening    Shift Goals  Clinical Goals: MRI brain  Patient Goals: test results, rest, management of dizziness      Problem: Pain - Standard  Goal: Alleviation of pain or a reduction in pain to the patient’s comfort goal  Description: Target End Date:  Prior to discharge or change in level of care    Document on Vitals flowsheet    1.  Document pain using the appropriate pain scale per order or unit policy  2.  Educate and implement non-pharmacologic comfort measures (i.e. relaxation, distraction, massage, cold/heat therapy, etc.)  3.  Pain management medications as ordered  4.  Reassess pain after pain med administration per policy  5.  If opiods administered assess patient's response to pain medication is appropriate per POSS sedation scale  6.  Follow pain management plan developed in collaboration with patient and interdisciplinary team (including palliative care or pain specialists if applicable)  Outcome: Progressing     Problem: Knowledge Deficit - Standard  Goal: Patient and family/care givers will demonstrate understanding of plan of care, disease process/condition, diagnostic tests and medications  Description: Target End Date:  1-3 days or as soon as patient condition allows    Document in Patient Education    1.  Patient and family/caregiver oriented to unit, equipment, visitation policy and means for communicating concern  2.  Complete/review Learning Assessment  3.  Assess knowledge level of disease process/condition, treatment plan, diagnostic tests and medications  4.  Explain disease process/condition, treatment plan, diagnostic tests and medications  Outcome: Progressing     Problem: Mobility  Goal: Patient's capacity to carry out activities will improve  Description: Target End Date:  Prior to discharge or change in level of care    1.  Assess for barriers to mobility/activity  2.  Implement activity per interdisciplinary team  recommendations  3.  Target activity level identified and patient/family/caregiver aware of goal  4.  Provide assistive devices  5.  Instruct patient/caregiver on proper use of assistive/adaptive devices  6.  Schedule activities and rest periods to decrease effects of fatigue  7.  Encourage mobilization to extent of ability  8.  Maintain proper body alignment  9.  Provide adequate pain management to allow progressive mobilization  10. Implement pace maker precautions as needed  Outcome: Progressing     Problem: Neuro Status  Goal: Neuro status will remain stable or improve  Description: Target End Date:  Prior to discharge or change in level of care    Document on Neuro assessment in the Assessment flowsheet    1.  Assess and monitor neurologic status per provider order/protocol/unit policy  2.  Assess level of consciousness and orientation  3.  Assess for speech, dysarthria, dysphagia, facial symmetry  4.  Assess visual field, eye movements, gaze preference, pupil reaction and size  5.  Assess muscle strength and motor response in all four extremities  6.  Assess for sensation (numbness and tingling)  7.  Assess basic neuro reflexes (cough, gag, corneal)  8.  Identify changes in neuro status and report to provider for testing/treatment orders  Outcome: Progressing

## 2024-06-11 NOTE — H&P
Hospital Medicine History & Physical Note    Date of Service  6/11/2024    Primary Care Physician  SISSY Holland    Consultants  None     Code Status  Full Code    Chief Complaint  Chief Complaint   Patient presents with    Dizziness     Sudden onset of dizziness and near syncope.    Hypotension     After dizziness pt checked BP, systolic in the 90s.    Nausea       History of Presenting Illness  Audrey Leyva is a 39 y.o. female past medical history of depression, ozempic for weight loss who presented 6/10/2024 with sudden onset of dizziness that started earlier this evening.  Denies any focal neurological weakness or slurred speech.  Reports occasional nausea.  Has not any blood thinners.  In the ER, CT of the head without contrast was negative for acute intracranial abnormality.  Beta-hCG was negative.  Admitted to medicine service.    I discussed the plan of care with patient and ERP .    Review of Systems  Review of Systems   Neurological:  Positive for dizziness.       Past Medical History   has a past medical history of Depression and Palpitations.    Surgical History   has a past surgical history that includes cholecystectomy.     Family History  family history includes Cancer in her mother; Diabetes in her mother; Hypertension in her mother; Stroke in her maternal uncle.   Family history reviewed with patient. There is no family history that is pertinent to the chief complaint.     Social History   reports that she quit smoking about 5 years ago. Her smoking use included cigarettes. She has never used smokeless tobacco. She reports that she does not currently use alcohol. She reports that she does not use drugs.    Allergies  No Known Allergies    Medications  Prior to Admission Medications   Prescriptions Last Dose Informant Patient Reported? Taking?   Tirzepatide 2.5 MG/0.5ML Solution Pen-injector 6/7/2024 at PM  Yes No   Sig: Inject  under the skin.   benzonatate (TESSALON) 100 MG Cap  Not Taking  No No   Sig: Take 1 Capsule by mouth 3 times a day as needed for Cough.   Patient not taking: Reported on 6/11/2024      Facility-Administered Medications: None       Physical Exam  Temp:  [36.8 °C (98.2 °F)-37 °C (98.6 °F)] 36.8 °C (98.2 °F)  Pulse:  [] 97  Resp:  [16-18] 18  BP: (101-131)/(57-78) 131/75  SpO2:  [95 %-100 %] 96 %  Blood Pressure: 131/75   Temperature: 36.8 °C (98.2 °F)   Pulse: 97   Respiration: 18   Pulse Oximetry: 96 %       Physical Exam  Vitals and nursing note reviewed.   Constitutional:       Appearance: Normal appearance.   HENT:      Head: Normocephalic and atraumatic.      Right Ear: Tympanic membrane normal.      Left Ear: Tympanic membrane normal.      Nose: Nose normal.      Mouth/Throat:      Mouth: Mucous membranes are moist.      Pharynx: Oropharynx is clear.   Eyes:      Extraocular Movements: Extraocular movements intact.      Pupils: Pupils are equal, round, and reactive to light.   Cardiovascular:      Rate and Rhythm: Normal rate and regular rhythm.      Pulses: Normal pulses.      Heart sounds: Normal heart sounds.   Pulmonary:      Effort: Pulmonary effort is normal.      Breath sounds: Normal breath sounds.   Abdominal:      General: Bowel sounds are normal. There is no distension.      Palpations: Abdomen is soft. There is no mass.   Musculoskeletal:         General: Normal range of motion.      Cervical back: Neck supple.   Skin:     General: Skin is warm.      Capillary Refill: Capillary refill takes less than 2 seconds.   Neurological:      General: No focal deficit present.      Mental Status: She is alert and oriented to person, place, and time. Mental status is at baseline.   Psychiatric:         Mood and Affect: Mood normal.         Behavior: Behavior normal.         Laboratory:  Recent Labs     06/10/24  2148   WBC 11.4*   RBC 5.29   HEMOGLOBIN 14.5   HEMATOCRIT 42.6   MCV 80.5*   MCH 27.4   MCHC 34.0   RDW 37.5   PLATELETCT 285   MPV 10.9      Recent Labs     06/10/24  2148   SODIUM 135   POTASSIUM 3.6   CHLORIDE 104   CO2 18*   GLUCOSE 109*   BUN 13   CREATININE 0.59   CALCIUM 8.5     Recent Labs     06/10/24  2148   GLUCOSE 109*         Imaging:  CT-HEAD W/O   Final Result         1.  No acute intracranial abnormality.         MR-BRAIN-W/O    (Results Pending)       no X-Ray or EKG requiring interpretation    Assessment/Plan:  Justification for Admission Status  I anticipate this patient is appropriate for observation status at this time because dizziness rule out central etiology    Patient will need a Telemetry bed on MEDICAL service .  The need is secondary to see above.    * Dizziness- (present on admission)  Assessment & Plan  CT head negative  Neuro checks every 4 hours   MRI brain w/o contrast to r/o central etilogy     Obesity (BMI 35.0-39.9 without comorbidity) (HCC)- (present on admission)  Assessment & Plan  BMI 37.29        VTE prophylaxis: SCDs/TEDs

## 2024-06-11 NOTE — PROGRESS NOTES
Maximus Sheffield MD messaged. Notified that pt has claustrophobia and MRI brain ordered. PRN meds for that, headache, and nausea ordered.

## 2024-06-14 ENCOUNTER — APPOINTMENT (OUTPATIENT)
Dept: RADIOLOGY | Facility: MEDICAL CENTER | Age: 39
End: 2024-06-14
Attending: EMERGENCY MEDICINE
Payer: COMMERCIAL

## 2024-06-14 ENCOUNTER — HOSPITAL ENCOUNTER (EMERGENCY)
Facility: MEDICAL CENTER | Age: 39
End: 2024-06-14
Attending: EMERGENCY MEDICINE
Payer: COMMERCIAL

## 2024-06-14 VITALS
TEMPERATURE: 97.9 F | OXYGEN SATURATION: 95 % | HEIGHT: 66 IN | WEIGHT: 229.28 LBS | DIASTOLIC BLOOD PRESSURE: 60 MMHG | RESPIRATION RATE: 16 BRPM | BODY MASS INDEX: 36.85 KG/M2 | SYSTOLIC BLOOD PRESSURE: 102 MMHG | HEART RATE: 99 BPM

## 2024-06-14 DIAGNOSIS — R00.2 PALPITATIONS: ICD-10-CM

## 2024-06-14 DIAGNOSIS — R42 DIZZINESS: ICD-10-CM

## 2024-06-14 DIAGNOSIS — R07.9 CHEST PAIN, UNSPECIFIED TYPE: ICD-10-CM

## 2024-06-14 LAB
ALBUMIN SERPL BCP-MCNC: 4.1 G/DL (ref 3.2–4.9)
ALBUMIN/GLOB SERPL: 1.1 G/DL
ALP SERPL-CCNC: 91 U/L (ref 30–99)
ALT SERPL-CCNC: 12 U/L (ref 2–50)
ANION GAP SERPL CALC-SCNC: 13 MMOL/L (ref 7–16)
AST SERPL-CCNC: 13 U/L (ref 12–45)
BASOPHILS # BLD AUTO: 0.4 % (ref 0–1.8)
BASOPHILS # BLD: 0.04 K/UL (ref 0–0.12)
BILIRUB SERPL-MCNC: 0.3 MG/DL (ref 0.1–1.5)
BUN SERPL-MCNC: 17 MG/DL (ref 8–22)
CALCIUM ALBUM COR SERPL-MCNC: 9.2 MG/DL (ref 8.5–10.5)
CALCIUM SERPL-MCNC: 9.3 MG/DL (ref 8.5–10.5)
CHLORIDE SERPL-SCNC: 103 MMOL/L (ref 96–112)
CO2 SERPL-SCNC: 22 MMOL/L (ref 20–33)
CREAT SERPL-MCNC: 0.57 MG/DL (ref 0.5–1.4)
D DIMER PPP IA.FEU-MCNC: 0.41 UG/ML (FEU) (ref 0–0.5)
EKG IMPRESSION: NORMAL
EOSINOPHIL # BLD AUTO: 0.06 K/UL (ref 0–0.51)
EOSINOPHIL NFR BLD: 0.6 % (ref 0–6.9)
ERYTHROCYTE [DISTWIDTH] IN BLOOD BY AUTOMATED COUNT: 38.5 FL (ref 35.9–50)
GFR SERPLBLD CREATININE-BSD FMLA CKD-EPI: 119 ML/MIN/1.73 M 2
GLOBULIN SER CALC-MCNC: 3.6 G/DL (ref 1.9–3.5)
GLUCOSE SERPL-MCNC: 98 MG/DL (ref 65–99)
HCG SERPL QL: NEGATIVE
HCT VFR BLD AUTO: 43.7 % (ref 37–47)
HGB BLD-MCNC: 14.1 G/DL (ref 12–16)
IMM GRANULOCYTES # BLD AUTO: 0.04 K/UL (ref 0–0.11)
IMM GRANULOCYTES NFR BLD AUTO: 0.4 % (ref 0–0.9)
LYMPHOCYTES # BLD AUTO: 1.69 K/UL (ref 1–4.8)
LYMPHOCYTES NFR BLD: 15.5 % (ref 22–41)
MCH RBC QN AUTO: 26.8 PG (ref 27–33)
MCHC RBC AUTO-ENTMCNC: 32.3 G/DL (ref 32.2–35.5)
MCV RBC AUTO: 82.9 FL (ref 81.4–97.8)
MONOCYTES # BLD AUTO: 0.64 K/UL (ref 0–0.85)
MONOCYTES NFR BLD AUTO: 5.9 % (ref 0–13.4)
NEUTROPHILS # BLD AUTO: 8.41 K/UL (ref 1.82–7.42)
NEUTROPHILS NFR BLD: 77.2 % (ref 44–72)
NRBC # BLD AUTO: 0 K/UL
NRBC BLD-RTO: 0 /100 WBC (ref 0–0.2)
PLATELET # BLD AUTO: 270 K/UL (ref 164–446)
PMV BLD AUTO: 9.9 FL (ref 9–12.9)
POTASSIUM SERPL-SCNC: 3.6 MMOL/L (ref 3.6–5.5)
PROT SERPL-MCNC: 7.7 G/DL (ref 6–8.2)
RBC # BLD AUTO: 5.27 M/UL (ref 4.2–5.4)
SODIUM SERPL-SCNC: 138 MMOL/L (ref 135–145)
TROPONIN T SERPL-MCNC: 11 NG/L (ref 6–19)
TROPONIN T SERPL-MCNC: <6 NG/L (ref 6–19)
TSH SERPL DL<=0.005 MIU/L-ACNC: 2.69 UIU/ML (ref 0.38–5.33)
WBC # BLD AUTO: 10.9 K/UL (ref 4.8–10.8)

## 2024-06-14 PROCEDURE — 84703 CHORIONIC GONADOTROPIN ASSAY: CPT

## 2024-06-14 PROCEDURE — 93005 ELECTROCARDIOGRAM TRACING: CPT

## 2024-06-14 PROCEDURE — 85379 FIBRIN DEGRADATION QUANT: CPT

## 2024-06-14 PROCEDURE — 80053 COMPREHEN METABOLIC PANEL: CPT

## 2024-06-14 PROCEDURE — 84484 ASSAY OF TROPONIN QUANT: CPT

## 2024-06-14 PROCEDURE — 85025 COMPLETE CBC W/AUTO DIFF WBC: CPT

## 2024-06-14 PROCEDURE — 71045 X-RAY EXAM CHEST 1 VIEW: CPT

## 2024-06-14 PROCEDURE — 93005 ELECTROCARDIOGRAM TRACING: CPT | Performed by: EMERGENCY MEDICINE

## 2024-06-14 PROCEDURE — 99285 EMERGENCY DEPT VISIT HI MDM: CPT

## 2024-06-14 PROCEDURE — 84443 ASSAY THYROID STIM HORMONE: CPT

## 2024-06-14 PROCEDURE — 36415 COLL VENOUS BLD VENIPUNCTURE: CPT

## 2024-06-14 RX ORDER — SCOLOPAMINE TRANSDERMAL SYSTEM 1 MG/1
1 PATCH, EXTENDED RELEASE TRANSDERMAL
Qty: 4 PATCH | Refills: 0 | Status: SHIPPED | OUTPATIENT
Start: 2024-06-14

## 2024-06-14 ASSESSMENT — HEART SCORE
HISTORY: SLIGHTLY SUSPICIOUS
HEART SCORE: 0
RISK FACTORS: NO KNOWN RISK FACTORS
ECG: NORMAL
AGE: <45
TROPONIN: LESS THAN OR EQUAL TO NORMAL LIMIT

## 2024-06-14 ASSESSMENT — PAIN DESCRIPTION - PAIN TYPE: TYPE: ACUTE PAIN

## 2024-06-14 ASSESSMENT — FIBROSIS 4 INDEX: FIB4 SCORE: 0.61

## 2024-06-14 NOTE — ED NOTES
RN reviewed d/c instructions w/ patient *and visitor*  including follow up and prescription information. Pt is alert and oriented. Pt  / visitor verbalized understanding of all instructions for discharge and is agreeable to plan of care. Pt is ambulatory out of ED with steady gait

## 2024-06-14 NOTE — ED PROVIDER NOTES
ED Provider Note    CHIEF COMPLAINT  Chief Complaint   Patient presents with    Chest Pain     Recent admit to hosp 6/10 for dizziness/hypotension, no discharge diagnosis, has had prior holter monitor work, began last night chest pain, mid L chest 5/10 tight pain     Shortness of Breath     Began last night, lightheaded, no resp diagnosis        EXTERNAL RECORDS REVIEWED  Inpatient Notes patient was admitted [place] 10 through 11, 2024 for dizziness and hypotension, was noted to be tachycardic at 117, she had MRI of the brain and head CT that were negative    HPI/ROS  LIMITATION TO HISTORY   Select: : None  OUTSIDE HISTORIAN(S):  none    Audrey Leyva is a 39 y.o. female who presents with chest pain and shortness of breath.  Patient was recently admitted for dizziness and had unremarkable workup.  However she reports yesterday she began to have some intermittent shortness of breath and tight type chest pain in the center of her chest.  It seems to be more when she is up moving around including last night at work although not overly exertional or pleuritic or positional.  She reports no recent illness fevers chills cough or congestion.  No abdominal pain, nausea or vomiting.  She reports no leg pain or swelling    Patient reports she has had some longstanding palpitations has had Holter monitor with PVCs but no other abnormality    She does take Mounjaro, no OCPs    PAST MEDICAL HISTORY   has a past medical history of Depression and Palpitations.    SURGICAL HISTORY   has a past surgical history that includes cholecystectomy.    FAMILY HISTORY  Family History   Problem Relation Age of Onset    Cancer Mother     Diabetes Mother     Hypertension Mother     Stroke Maternal Uncle        SOCIAL HISTORY  Social History     Tobacco Use    Smoking status: Former     Current packs/day: 0.00     Types: Cigarettes     Quit date: 2018     Years since quittin.5    Smokeless tobacco: Never   Vaping Use    Vaping  "status: Never Used   Substance and Sexual Activity    Alcohol use: Yes     Comment: soc    Drug use: No    Sexual activity: Not Currently       CURRENT MEDICATIONS  Home Medications       Reviewed by Magali Arnold R.N. (Registered Nurse) on 06/14/24 at 1110  Med List Status: Partial     Medication Last Dose Status   meclizine (ANTIVERT) 25 MG Tab  Active   Tirzepatide 2.5 MG/0.5ML Solution Pen-injector  Active                  Audit from Redirected Encounters    **Home medications have not yet been reviewed for this encounter**         ALLERGIES  No Known Allergies    PHYSICAL EXAM  VITAL SIGNS: /71   Pulse 96   Temp 36.6 °C (97.8 °F) (Temporal)   Resp 16   Ht 1.676 m (5' 6\")   Wt 104 kg (229 lb 4.5 oz)   LMP 06/10/2024 (Exact Date)   SpO2 96%   BMI 37.01 kg/m²      Pulse ox interpretation: I interpret this pulse ox as normal.  Constitutional: Alert in no apparent distress.  HENT: No signs of trauma, Bilateral external ears normal, Nose normal.   Eyes: Conjunctiva normal, Non-icteric.   Neck: Normal range of motion, No tenderness, Supple, No stridor.   Cardiovascular: Tachycardic, regular rhythm, no murmurs.   Thorax & Lungs: Normal breath sounds, No respiratory distress, No wheezing, No chest tenderness.   Abdomen:  Soft, No tenderness, No masses, No pulsatile masses. No peritoneal signs.  Skin: Warm, Dry, No erythema, No rash.   Back: No bony tenderness, No CVA tenderness.   Extremities: Intact distal pulses, No edema, No tenderness, No cyanosis,  Negative Guillermo's sign.   Musculoskeletal: No major deformities noted.   Neurologic: Alert , Normal motor function, Normal sensory function, No focal deficits noted.   Psychiatric: Affect normal, Judgment normal, Mood normal.               EKG/LABS  Results for orders placed or performed during the hospital encounter of 06/14/24   CBC with Differential   Result Value Ref Range    WBC 10.9 (H) 4.8 - 10.8 K/uL    RBC 5.27 4.20 - 5.40 M/uL    Hemoglobin " 14.1 12.0 - 16.0 g/dL    Hematocrit 43.7 37.0 - 47.0 %    MCV 82.9 81.4 - 97.8 fL    MCH 26.8 (L) 27.0 - 33.0 pg    MCHC 32.3 32.2 - 35.5 g/dL    RDW 38.5 35.9 - 50.0 fL    Platelet Count 270 164 - 446 K/uL    MPV 9.9 9.0 - 12.9 fL    Neutrophils-Polys 77.20 (H) 44.00 - 72.00 %    Lymphocytes 15.50 (L) 22.00 - 41.00 %    Monocytes 5.90 0.00 - 13.40 %    Eosinophils 0.60 0.00 - 6.90 %    Basophils 0.40 0.00 - 1.80 %    Immature Granulocytes 0.40 0.00 - 0.90 %    Nucleated RBC 0.00 0.00 - 0.20 /100 WBC    Neutrophils (Absolute) 8.41 (H) 1.82 - 7.42 K/uL    Lymphs (Absolute) 1.69 1.00 - 4.80 K/uL    Monos (Absolute) 0.64 0.00 - 0.85 K/uL    Eos (Absolute) 0.06 0.00 - 0.51 K/uL    Baso (Absolute) 0.04 0.00 - 0.12 K/uL    Immature Granulocytes (abs) 0.04 0.00 - 0.11 K/uL    NRBC (Absolute) 0.00 K/uL   Complete Metabolic Panel (CMP)   Result Value Ref Range    Sodium 138 135 - 145 mmol/L    Potassium 3.6 3.6 - 5.5 mmol/L    Chloride 103 96 - 112 mmol/L    Co2 22 20 - 33 mmol/L    Anion Gap 13.0 7.0 - 16.0    Glucose 98 65 - 99 mg/dL    Bun 17 8 - 22 mg/dL    Creatinine 0.57 0.50 - 1.40 mg/dL    Calcium 9.3 8.5 - 10.5 mg/dL    Correct Calcium 9.2 8.5 - 10.5 mg/dL    AST(SGOT) 13 12 - 45 U/L    ALT(SGPT) 12 2 - 50 U/L    Alkaline Phosphatase 91 30 - 99 U/L    Total Bilirubin 0.3 0.1 - 1.5 mg/dL    Albumin 4.1 3.2 - 4.9 g/dL    Total Protein 7.7 6.0 - 8.2 g/dL    Globulin 3.6 (H) 1.9 - 3.5 g/dL    A-G Ratio 1.1 g/dL   Troponins NOW   Result Value Ref Range    Troponin T 11 6 - 19 ng/L   Troponins in two (2) hours   Result Value Ref Range    Troponin T <6 6 - 19 ng/L   HCG Qual Serum   Result Value Ref Range    Beta-Hcg Qualitative Serum Negative Negative   ESTIMATED GFR   Result Value Ref Range    GFR (CKD-EPI) 119 >60 mL/min/1.73 m 2   D-DIMER   Result Value Ref Range    D-Dimer 0.41 0.00 - 0.50 ug/mL (FEU)   TSH WITH REFLEX TO FT4   Result Value Ref Range    TSH 2.690 0.380 - 5.330 uIU/mL   EKG   Result Value Ref Range     Report       [facility] Emergency Dept.    Test Date:  2024  Pt Name:    ALYSSA PHILLIPS               Department: ER  MRN:        [id number]                      Room:  Gender:     Female                       Technician: [id number]  :        1985                   Requested By:ER TRIAGE PROTOCOL  Order #:    [id number]                    Reading MD: OLEG ZEPEDA MD    Measurements  Intervals                                Axis  Rate:       102                          P:          38  GA:         137                          QRS:        103  QRSD:       98                           T:          29  QT:         340  QTc:        443    Interpretive Statements  Sinus tachycardia  Borderline right axis deviation  Compared to ECG 06/10/2024 21:40:13  No significant changes  Electronically Signed On 2024 12:45:52 PDT by OLEG ZEPEDA MD         I have independently interpreted this EKG    RADIOLOGY/PROCEDURES   I have independently interpreted the diagnostic imaging associated with this visit and am waiting the final reading from the radiologist.   My preliminary interpretation is as follows: no edema    Radiologist interpretation:  DX-CHEST-PORTABLE (1 VIEW)   Final Result      No acute cardiac or pulmonary abnormalities are identified.          COURSE & MEDICAL DECISION MAKING    ASSESSMENT, COURSE AND PLAN  Care Narrative: 12:45 PM  Patient is evaluated the bedside and chart is reviewed, differential diagnosis is considered as below, ordered for diagnostic labs, x-ray, ECG.    Patient is reevaluated and updated on all results.  She is comfortable with discharge and will be referred for cardiology follow-up as well as primary care    Chest Pain: HEART Score: 0           PROBLEMS MANAGED  # Chest pain.  At this point no findings of emergent process ACS is less likely given atypical nature of pain, ECG with no ischemic changes, negative troponin x2, HEART score of 0, PE is less likely  given nature of pain not consistent with PE and negative D-dimer. Dissection, aneurysm and pneumothorax are unlikely given  nature of the pain  as well as Xray lacking evidence of either. Other non emergent causes such as costochondritis, muscle strain,GI causes were also considered    # Palpitations.  Patient with longstanding palpitations has prior been diagnosed with PVCs.  No findings of significant arrhythmia here up arrhythmia genic state.  No electrolyte or metabolic derangement.    Given her recurrent palpitations and symptoms we will be referred to cardiology for outpatient follow-up    I discussed strict return precautions with pt including shortness of breath, new/persistent/worse pain, syncope, vomiting, fever, palpitations, abdominal pain or any other concerns. Pt understood these well and was instructed to follow up with PCP.        DISPOSITION AND DISCUSSIONS    Barriers to care at this time, including but not limited to:  none .     Decision tools and prescription drugs considered including, but not limited to:  Heart score 0 .    The patient will return for new or worsening symptoms and is stable at the time of discharge.    The patient is referred to a primary physician for blood pressure management, diabetic screening, and for all other preventative health concerns.        DISPOSITION:  Patient will be discharged home in stable condition.    FOLLOW UP:  Carmelo Huntley A.P.R.NRamesh  [address] [facility] [id number] [phone]          [facility]  [address]  [facility] [address]  [phone]          OUTPATIENT MEDICATIONS:  New Prescriptions    No medications on file         FINAL DIAGNOSIS  1. Chest pain, unspecified type    2. Palpitations           Electronically signed by: Ming Bae M.D., 6/14/2024 12:44 PM

## 2024-06-14 NOTE — ED NOTES
Pt ambulated to ER room 60 with steady gait , pt placed on gurney , pt changed into gown , pt attached to monitor. Pt given call light and instructions on how to use, pt chart up for ERP to see.

## 2024-06-14 NOTE — ED TRIAGE NOTES
"Chief Complaint   Patient presents with    Chest Pain     Recent admit to hosp 6/10 for dizziness/hypotension, no discharge diagnosis, has had prior holter monitor work, began last night chest pain, mid L chest 5/10 tight pain     Shortness of Breath     Began last night, lightheaded, no resp diagnosis         Ambulated to triage for above complaint.     Chest pain protocols ordered. Pt brought to [facility] office for blood draw. EKG completed. Pt educated of triage process and informed to contact staff if situation changes.    /82   Pulse (!) 110   Temp 36.6 °C (97.8 °F) (Temporal)   Resp 16   Ht 1.676 m (5' 6\")   Wt 104 kg (229 lb 4.5 oz)   LMP 06/10/2024 (Exact Date)   SpO2 99%   BMI 37.01 kg/m²      "

## 2024-06-18 ENCOUNTER — OFFICE VISIT (OUTPATIENT)
Dept: MEDICAL GROUP | Facility: PHYSICIAN GROUP | Age: 39
End: 2024-06-18
Payer: COMMERCIAL

## 2024-06-18 VITALS
HEIGHT: 66 IN | WEIGHT: 227.8 LBS | HEART RATE: 114 BPM | TEMPERATURE: 98.3 F | BODY MASS INDEX: 36.61 KG/M2 | RESPIRATION RATE: 11 BRPM | DIASTOLIC BLOOD PRESSURE: 64 MMHG | OXYGEN SATURATION: 98 % | SYSTOLIC BLOOD PRESSURE: 100 MMHG

## 2024-06-18 DIAGNOSIS — E66.9 OBESITY (BMI 35.0-39.9 WITHOUT COMORBIDITY): ICD-10-CM

## 2024-06-18 DIAGNOSIS — R00.2 PALPITATIONS: ICD-10-CM

## 2024-06-18 PROCEDURE — 3078F DIAST BP <80 MM HG: CPT

## 2024-06-18 PROCEDURE — 3074F SYST BP LT 130 MM HG: CPT

## 2024-06-18 PROCEDURE — 99214 OFFICE O/P EST MOD 30 MIN: CPT

## 2024-06-18 RX ORDER — MECLIZINE HCL 12.5 MG/1
12.5 TABLET ORAL 3 TIMES DAILY PRN
Qty: 30 TABLET | Refills: 0 | Status: SHIPPED | OUTPATIENT
Start: 2024-06-18

## 2024-06-18 ASSESSMENT — PATIENT HEALTH QUESTIONNAIRE - PHQ9
3. TROUBLE FALLING OR STAYING ASLEEP OR SLEEPING TOO MUCH: NOT AT ALL
5. POOR APPETITE OR OVEREATING: NOT AT ALL
SUM OF ALL RESPONSES TO PHQ QUESTIONS 1-9: 0
6. FEELING BAD ABOUT YOURSELF - OR THAT YOU ARE A FAILURE OR HAVE LET YOURSELF OR YOUR FAMILY DOWN: NOT AL ALL
8. MOVING OR SPEAKING SO SLOWLY THAT OTHER PEOPLE COULD HAVE NOTICED. OR THE OPPOSITE, BEING SO FIGETY OR RESTLESS THAT YOU HAVE BEEN MOVING AROUND A LOT MORE THAN USUAL: NOT AT ALL
SUM OF ALL RESPONSES TO PHQ9 QUESTIONS 1 AND 2: 0
4. FEELING TIRED OR HAVING LITTLE ENERGY: NOT AT ALL
7. TROUBLE CONCENTRATING ON THINGS, SUCH AS READING THE NEWSPAPER OR WATCHING TELEVISION: NOT AT ALL
2. FEELING DOWN, DEPRESSED, IRRITABLE, OR HOPELESS: NOT AT ALL
1. LITTLE INTEREST OR PLEASURE IN DOING THINGS: NOT AT ALL
9. THOUGHTS THAT YOU WOULD BE BETTER OFF DEAD, OR OF HURTING YOURSELF: NOT AT ALL

## 2024-06-18 ASSESSMENT — FIBROSIS 4 INDEX: FIB4 SCORE: 0.53

## 2024-06-18 NOTE — PROGRESS NOTES
Verbal consent was acquired by the patient to use [employer] ambient listening note generation during this visit ***    Subjective:     HPI:   History of Present Illness          Assessment & Plan:     1. Palpitations            Assessment & Plan      Health Maintenance: {COMPLETED:186492}    Objective:     Exam:  Objective:  Vitals:    06/18/24 1441   BP: 100/64   Pulse: (!) 114   Resp: (!) 11   Temp: 36.8 °C (98.3 °F)   SpO2: 98%     Physical Exam  Physical Exam    Constitutional:       Appearance: Normal appearance.   Eyes:      Extraocular Movements: Extraocular movements intact.   Pulmonary:      Effort: Pulmonary effort is normal.   Neurological:      General: No focal deficit present.      Mental Status: She is alert and oriented to person, place, and time.   Psychiatric:         Mood and Affect: Mood normal.         Behavior: Behavior normal.       Results      No follow-ups on file.    Please note that this dictation was created using voice recognition software. I have made every reasonable attempt to correct obvious errors, but I expect that there are errors of grammar and possibly content that I did not discover before finalizing the note.               Palpitations  meclizine (ANTIVERT) 12.5 MG Tab    REFERRAL TO CARDIOLOGY      2. Obesity (BMI 35.0-39.9 without comorbidity) ([facility])            Assessment & Plan  1. Palpitations.  The patient's palpitations, a chronic and persistent concern, have previously yielded negative results with a negative ER work-up. However, a recent episode was accompanied by dizziness, hypotension, and shortness of breath. The patient has been advised to increase her water intake and utilize an electrolyte-based supplement such as Liquid IV. The dizziness could potentially be attributed to vertigo, hence a prescription for meclizine has been issued. A follow-up with cardiology has been scheduled. The patient's current tirzepatide appetite use was discussed, and it was not advised that she increase the dosage to 15 mg as scheduled. However, she will consult her provider regarding this.    Health Maintenance: Completed    Objective:     Exam:  Objective:  Vitals:    06/18/24 1441   BP: 100/64   Pulse: (!) 114   Resp: (!) 11   Temp: 36.8 °C (98.3 °F)   SpO2: 98%     Physical Exam  Physical Exam  Cardiovascular:      Rate and Rhythm: Regular rhythm. Tachycardia present.         Constitutional:       Appearance: Normal appearance.   Eyes:      Extraocular Movements: Extraocular movements intact.   Pulmonary:      Effort: Pulmonary effort is normal.   Neurological:      General: No focal deficit present.      Mental Status: She is alert and oriented to person, place, and time.   Psychiatric:         Mood and Affect: Mood normal.         Behavior: Behavior normal.       Results  Laboratory Studies  Sodium level was borderline at 136.    Imaging  Cardiac work-up, EKG, labs, and chest x-ray were normal.    Return if symptoms worsen or fail to improve.    Please note that this dictation was created using voice recognition software. I have made every reasonable attempt to correct obvious errors, but I expect that there are errors of grammar and  possibly content that I did not discover before finalizing the note.

## 2024-06-20 ENCOUNTER — TELEPHONE (OUTPATIENT)
Dept: HEALTH INFORMATION MANAGEMENT | Facility: OTHER | Age: 39
End: 2024-06-20
Payer: COMMERCIAL

## 2024-07-26 ENCOUNTER — TELEPHONE (OUTPATIENT)
Dept: CARDIOLOGY | Facility: MEDICAL CENTER | Age: 39
End: 2024-07-26
Payer: COMMERCIAL

## 2024-07-29 ENCOUNTER — APPOINTMENT (OUTPATIENT)
Dept: MEDICAL GROUP | Facility: PHYSICIAN GROUP | Age: 39
End: 2024-07-29
Payer: COMMERCIAL

## 2024-07-30 ENCOUNTER — APPOINTMENT (OUTPATIENT)
Dept: MEDICAL GROUP | Facility: PHYSICIAN GROUP | Age: 39
End: 2024-07-30
Payer: COMMERCIAL

## 2024-08-06 ENCOUNTER — OFFICE VISIT (OUTPATIENT)
Dept: CARDIOLOGY | Facility: MEDICAL CENTER | Age: 39
End: 2024-08-06
Attending: EMERGENCY MEDICINE
Payer: COMMERCIAL

## 2024-08-06 VITALS
HEART RATE: 100 BPM | OXYGEN SATURATION: 96 % | BODY MASS INDEX: 36.32 KG/M2 | SYSTOLIC BLOOD PRESSURE: 112 MMHG | HEIGHT: 66 IN | WEIGHT: 226 LBS | DIASTOLIC BLOOD PRESSURE: 70 MMHG | RESPIRATION RATE: 16 BRPM

## 2024-08-06 DIAGNOSIS — R00.2 PALPITATIONS: ICD-10-CM

## 2024-08-06 LAB — EKG IMPRESSION: NORMAL

## 2024-08-06 PROCEDURE — 99203 OFFICE O/P NEW LOW 30 MIN: CPT | Performed by: INTERNAL MEDICINE

## 2024-08-06 PROCEDURE — 99211 OFF/OP EST MAY X REQ PHY/QHP: CPT | Performed by: INTERNAL MEDICINE

## 2024-08-06 PROCEDURE — 3074F SYST BP LT 130 MM HG: CPT | Performed by: INTERNAL MEDICINE

## 2024-08-06 PROCEDURE — 3078F DIAST BP <80 MM HG: CPT | Performed by: INTERNAL MEDICINE

## 2024-08-06 PROCEDURE — 93010 ELECTROCARDIOGRAM REPORT: CPT | Performed by: INTERNAL MEDICINE

## 2024-08-06 PROCEDURE — 93005 ELECTROCARDIOGRAM TRACING: CPT | Performed by: INTERNAL MEDICINE

## 2024-08-06 ASSESSMENT — ENCOUNTER SYMPTOMS
WEIGHT LOSS: 0
ORTHOPNEA: 0
FLANK PAIN: 0
DEPRESSION: 0
VOMITING: 0
ABDOMINAL PAIN: 0
WEIGHT GAIN: 0
PND: 0
ALTERED MENTAL STATUS: 0
SYNCOPE: 0
FEVER: 0
DIARRHEA: 0
DYSPNEA ON EXERTION: 0
BACK PAIN: 0
NAUSEA: 0
PALPITATIONS: 0
NEAR-SYNCOPE: 0
DECREASED APPETITE: 0
SHORTNESS OF BREATH: 0
CLAUDICATION: 0
BLURRED VISION: 0
COUGH: 0
IRREGULAR HEARTBEAT: 0
CONSTIPATION: 0
HEARTBURN: 0
DIZZINESS: 0

## 2024-08-06 ASSESSMENT — FIBROSIS 4 INDEX: FIB4 SCORE: 0.53

## 2024-08-06 NOTE — PROGRESS NOTES
Cardiology Note    Chief Complaint   Patient presents with    Palpitations       History of Present Illness: Audrey Leyva is a 39 y.o. female who presents for initial visit.    Admission for palpitations and hypotension 6/10/24. Stable workup. Return to ED shortly after with similar symptoms. Admits palpitations last a few seconds. Had undergone event monitor also without significant sustained arrhythmia. Denies toxic social habits. No stimulant use. Does get poor sleep. Works night shift as nurse. Typically feels worse on days she is working. Admits she doesn't stay well hydrated. Also has to care for her daughter. No relevant family history.     Review of Systems   Constitutional: Negative for decreased appetite, fever, malaise/fatigue, weight gain and weight loss.   HENT:  Negative for congestion and nosebleeds.    Eyes:  Negative for blurred vision.   Cardiovascular:  Negative for chest pain, claudication, dyspnea on exertion, irregular heartbeat, leg swelling, near-syncope, orthopnea, palpitations, paroxysmal nocturnal dyspnea and syncope.   Respiratory:  Negative for cough and shortness of breath.    Endocrine: Negative for cold intolerance and heat intolerance.   Skin:  Negative for rash.   Musculoskeletal:  Negative for back pain.   Gastrointestinal:  Negative for abdominal pain, constipation, diarrhea, heartburn, melena, nausea and vomiting.   Genitourinary:  Negative for dysuria, flank pain and hematuria.   Neurological:  Negative for dizziness.   Psychiatric/Behavioral:  Negative for altered mental status and depression.          Past Medical History:   Diagnosis Date    Depression     Palpitations     pt wore halter monitor, no findings         Past Surgical History:   Procedure Laterality Date    CHOLECYSTECTOMY           Current Outpatient Medications   Medication Sig Dispense Refill    meclizine (ANTIVERT) 12.5 MG Tab Take 1 Tablet by mouth 3 times a day as needed for Dizziness. 30 Tablet 0     Tirzepatide 2.5 MG/0.5ML Solution Pen-injector Inject  under the skin.       No current facility-administered medications for this visit.         No Known Allergies      Family History   Problem Relation Age of Onset    Cancer Mother     Diabetes Mother     Hypertension Mother     Stroke Maternal Uncle          Social History     Socioeconomic History    Marital status: Single     Spouse name: Not on file    Number of children: Not on file    Years of education: Not on file    Highest education level: Not on file   Occupational History    Not on file   Tobacco Use    Smoking status: Former     Current packs/day: 0.00     Types: Cigarettes     Quit date: 2018     Years since quittin.6    Smokeless tobacco: Never   Vaping Use    Vaping status: Never Used   Substance and Sexual Activity    Alcohol use: Yes     Comment: soc    Drug use: No    Sexual activity: Not Currently   Other Topics Concern    Not on file   Social History Narrative    Not on file     Social Determinants of Health     Financial Resource Strain: Not on file   Food Insecurity: No Food Insecurity (2024)    Hunger Vital Sign     Worried About Running Out of Food in the Last Year: Never true     Ran Out of Food in the Last Year: Never true   Transportation Needs: No Transportation Needs (2024)    PRAPARE - Transportation     Lack of Transportation (Medical): No     Lack of Transportation (Non-Medical): No   Physical Activity: Not on file   Stress: Not on file   Social Connections: Not on file   Intimate Partner Violence: Not At Risk (2024)    Humiliation, Afraid, Rape, and Kick questionnaire     Fear of Current or Ex-Partner: No     Emotionally Abused: No     Physically Abused: No     Sexually Abused: No   Housing Stability: Low Risk  (2024)    Housing Stability Vital Sign     Unable to Pay for Housing in the Last Year: No     Number of Places Lived in the Last Year: 1     Unstable Housing in the Last Year: No  "        Physical Exam:  Ambulatory Vitals  /70 (BP Location: Left arm, Patient Position: Sitting, BP Cuff Size: Adult)   Pulse 100   Resp 16   Ht 1.676 m (5' 6\")   Wt 103 kg (226 lb)   SpO2 96%    BP Readings from Last 4 Encounters:   08/06/24 112/70   06/18/24 100/64   06/14/24 102/60   06/11/24 109/60     Weight/BMI:   Vitals:    08/06/24 1318   BP: 112/70   Weight: 103 kg (226 lb)   Height: 1.676 m (5' 6\")    Body mass index is 36.48 kg/m².  Wt Readings from Last 4 Encounters:   08/06/24 103 kg (226 lb)   06/18/24 103 kg (227 lb 12.8 oz)   06/14/24 104 kg (229 lb 4.5 oz)   06/11/24 105 kg (231 lb 0.7 oz)       Physical Exam  Constitutional:       General: She is not in acute distress.  HENT:      Head: Normocephalic and atraumatic.   Eyes:      Conjunctiva/sclera: Conjunctivae normal.      Pupils: Pupils are equal, round, and reactive to light.   Neck:      Vascular: No JVD.   Cardiovascular:      Rate and Rhythm: Normal rate and regular rhythm.      Heart sounds: Normal heart sounds. No murmur heard.     No friction rub. No gallop.   Pulmonary:      Effort: Pulmonary effort is normal. No respiratory distress.      Breath sounds: Normal breath sounds. No wheezing or rales.   Chest:      Chest wall: No tenderness.   Abdominal:      General: Bowel sounds are normal. There is no distension.      Palpations: Abdomen is soft.   Musculoskeletal:      Cervical back: Normal range of motion and neck supple.   Skin:     General: Skin is warm and dry.   Neurological:      Mental Status: She is alert and oriented to person, place, and time.   Psychiatric:         Mood and Affect: Affect normal.         Judgment: Judgment normal.         Lab Data Review:  Lab Results   Component Value Date/Time    CHOLSTRLTOT 167 05/05/2023 07:20 AM    LDL 98 05/05/2023 07:20 AM    HDL 52 05/05/2023 07:20 AM    TRIGLYCERIDE 87 05/05/2023 07:20 AM       Lab Results   Component Value Date/Time    SODIUM 138 06/14/2024 11:16 AM    " "POTASSIUM 3.6 06/14/2024 11:16 AM    CHLORIDE 103 06/14/2024 11:16 AM    CO2 22 06/14/2024 11:16 AM    GLUCOSE 98 06/14/2024 11:16 AM    BUN 17 06/14/2024 11:16 AM    CREATININE 0.57 06/14/2024 11:16 AM     CrCl cannot be calculated (Patient's most recent lab result is older than the maximum 7 days allowed.).  Lab Results   Component Value Date/Time    ALKPHOSPHAT 91 06/14/2024 11:16 AM    ASTSGOT 13 06/14/2024 11:16 AM    ALTSGPT 12 06/14/2024 11:16 AM    TBILIRUBIN 0.3 06/14/2024 11:16 AM      Lab Results   Component Value Date/Time    WBC 10.9 (H) 06/14/2024 11:16 AM     Lab Results   Component Value Date/Time    HBA1C 6.0 (H) 05/05/2023 07:20 AM     No components found for: \"TROP\"      Cardiac Imaging and Procedures Review:      Event monitor 12/1/22  BIOTEL CONCLUSIONS (10/27/22-11/10/22)  Bio[employer] study showing predominately sinus tachycardia with maximum rate of 177 bpm, minimum rate of 72 bpm, average of 105 bpm.  Atrial fibrillation: None.  Supraventricular tachycardia: None.  Pauses: None.  Heart block: None.  Ventricular tachycardia: None.  <1% burden of premature ventricular contractions and <1% burden of premature atrial contractions.  Symptoms correlated with sinus rhythm, sinus tachycardia.       Medical Decision Making:  Problem List Items Addressed This Visit       Palpitations    Relevant Orders    EKG (Completed)     Very short lived palpitations with sinus detected to date. Low pretest probability cardiac disease. Modifiable factors such as better sleep and better fluid intake very likely to improve symptoms. Conservative management for now. Reassurance provided. If symptoms should worsen or more typically cardiac can reconsider further workup.     It was my pleasure to meet with Ms. Leyva.                        "

## 2024-11-11 ENCOUNTER — APPOINTMENT (OUTPATIENT)
Dept: MEDICAL GROUP | Facility: PHYSICIAN GROUP | Age: 39
End: 2024-11-11
Payer: COMMERCIAL

## 2024-11-11 VITALS
TEMPERATURE: 97.6 F | WEIGHT: 235.6 LBS | DIASTOLIC BLOOD PRESSURE: 68 MMHG | HEIGHT: 66 IN | SYSTOLIC BLOOD PRESSURE: 100 MMHG | HEART RATE: 89 BPM | BODY MASS INDEX: 37.86 KG/M2 | OXYGEN SATURATION: 97 % | RESPIRATION RATE: 13 BRPM

## 2024-11-11 DIAGNOSIS — E66.9 OBESITY (BMI 35.0-39.9 WITHOUT COMORBIDITY): ICD-10-CM

## 2024-11-11 DIAGNOSIS — Z13.0 SCREENING FOR IRON DEFICIENCY ANEMIA: ICD-10-CM

## 2024-11-11 DIAGNOSIS — Z13.0 SCREENING FOR ENDOCRINE, NUTRITIONAL, METABOLIC AND IMMUNITY DISORDER: ICD-10-CM

## 2024-11-11 DIAGNOSIS — R73.03 PREDIABETES: ICD-10-CM

## 2024-11-11 DIAGNOSIS — Z11.4 SCREENING FOR HIV (HUMAN IMMUNODEFICIENCY VIRUS): ICD-10-CM

## 2024-11-11 DIAGNOSIS — Z13.21 SCREENING FOR ENDOCRINE, NUTRITIONAL, METABOLIC AND IMMUNITY DISORDER: ICD-10-CM

## 2024-11-11 DIAGNOSIS — E04.1 THYROID NODULE: ICD-10-CM

## 2024-11-11 DIAGNOSIS — Z13.29 SCREENING FOR ENDOCRINE, NUTRITIONAL, METABOLIC AND IMMUNITY DISORDER: ICD-10-CM

## 2024-11-11 DIAGNOSIS — Z13.228 SCREENING FOR ENDOCRINE, NUTRITIONAL, METABOLIC AND IMMUNITY DISORDER: ICD-10-CM

## 2024-11-11 DIAGNOSIS — R76.8 ANTI-TPO ANTIBODIES PRESENT: ICD-10-CM

## 2024-11-11 LAB
HBA1C MFR BLD: 5.1 % (ref ?–5.8)
POCT INT CON NEG: NEGATIVE
POCT INT CON POS: POSITIVE

## 2024-11-11 PROCEDURE — 99214 OFFICE O/P EST MOD 30 MIN: CPT

## 2024-11-11 PROCEDURE — 83036 HEMOGLOBIN GLYCOSYLATED A1C: CPT

## 2024-11-11 PROCEDURE — 3078F DIAST BP <80 MM HG: CPT

## 2024-11-11 PROCEDURE — 3074F SYST BP LT 130 MM HG: CPT

## 2024-11-11 ASSESSMENT — FIBROSIS 4 INDEX: FIB4 SCORE: 0.53

## 2024-11-11 NOTE — PROGRESS NOTES
Verbal consent was acquired by the patient to use [employer] ambient listening note generation during this visit     Subjective:     HPI:   History of Present Illness  The patient is a 39-year-old female presenting for a follow-up visit. She was last seen in 06/2024 and was referred to cardiology for evaluation of intermittent palpitations. The cardiologist advised better sleep and improved fluid intake.    She reports no significant changes since her last visit. She has consulted with a cardiologist and reports no further episodes of palpitations. She does not experience any dizzy spells. Occasionally, she experiences an irregular heartbeat, but it is less severe and less frequent than before.    She continues to take Mounjaro 5 mg, which she tolerates well. She had a brief period of discontinuation but has since resumed the medication. She completed a 2-month course yesterday and experienced a weight loss of 5 pounds during this period.     She has been experiencing pain in her left knee for approximately 6 weeks. The pain is particularly severe when kneeling, but she can walk and run without discomfort. The pain is located on the side and back of the knee, not the front. She reports no twisting injuries. The pain was first noticed when she knelt down to assist her child. The intensity of the pain has slightly decreased but remains present. She reports no inflammation and does not use a knee brace.        Assessment & Plan:     Problem List Items Addressed This Visit       Obesity (BMI 35.0-39.9 without comorbidity) (HCC)    Prediabetes    Relevant Orders    POCT  A1C (Completed)    Thyroid nodule     Other Visit Diagnoses       Anti-TPO antibodies present        Relevant Orders    TSH WITH REFLEX TO FT4    Screening for iron deficiency anemia        Relevant Orders    CBC WITHOUT DIFFERENTIAL    Screening for endocrine, nutritional, metabolic and immunity disorder        Relevant Orders    Lipid Profile    Comp  Metabolic Panel    Screening for HIV (human immunodeficiency virus)        Relevant Orders    HIV AG/AB COMBO ASSAY SCREENING              Assessment & Plan  1. Weight management with prediabetes.  This is a chronic, stable medical condition.   Her A1c level is commendable at 5.1. A prescription for Mounjaro 7.5 mg SQ weekly will be sent to [facility]. Updated lab tests will be ordered. She is advised to continue her current medication regimen and monitor her weight.    2. Left knee pain.  This is an acute, uncomplicated problem.   No instability issues, no popping, locking, or pain with activity aside from squatting.  The pain appears to be more related to a ligament strain or sprain. She is advised to use a compression brace for additional support when squatting or kneeling.    3. Thyroid nodule with anti TPO antibodies present   This is a chronic, stable medical condition.   Due for updated labs.     Follow-up  Return in a few months for follow up.      Health Maintenance: Orders placed as applicable to patient     Objective:     Exam:  Objective:  Vitals:    11/11/24 1043   BP: 100/68   Pulse: 89   Resp: 13   Temp: 36.4 °C (97.6 °F)   SpO2: 97%     Physical Exam  Physical Exam    Constitutional:       Appearance: Normal appearance.   Eyes:      Extraocular Movements: Extraocular movements intact.   Pulmonary:      Effort: Pulmonary effort is normal.   Neurological:      General: No focal deficit present.      Mental Status: She is alert and oriented to person, place, and time.   Psychiatric:         Mood and Affect: Mood normal.         Behavior: Behavior normal.       Return in about 8 weeks (around 1/6/2025).    Please note that this dictation was created using voice recognition software. I have made every reasonable attempt to correct obvious errors, but I expect that there are errors of grammar and possibly content that I did not discover before finalizing the note.

## 2025-01-18 ENCOUNTER — OFFICE VISIT (OUTPATIENT)
Dept: URGENT CARE | Facility: PHYSICIAN GROUP | Age: 40
End: 2025-01-18
Payer: COMMERCIAL

## 2025-01-18 VITALS
OXYGEN SATURATION: 96 % | HEIGHT: 66 IN | HEART RATE: 99 BPM | TEMPERATURE: 97.5 F | RESPIRATION RATE: 14 BRPM | DIASTOLIC BLOOD PRESSURE: 78 MMHG | BODY MASS INDEX: 38.62 KG/M2 | WEIGHT: 240.3 LBS | SYSTOLIC BLOOD PRESSURE: 118 MMHG

## 2025-01-18 DIAGNOSIS — B96.89 ACUTE BACTERIAL SINUSITIS: ICD-10-CM

## 2025-01-18 DIAGNOSIS — J01.90 ACUTE BACTERIAL SINUSITIS: ICD-10-CM

## 2025-01-18 PROCEDURE — 99213 OFFICE O/P EST LOW 20 MIN: CPT | Performed by: FAMILY MEDICINE

## 2025-01-18 PROCEDURE — 3078F DIAST BP <80 MM HG: CPT | Performed by: FAMILY MEDICINE

## 2025-01-18 PROCEDURE — 3074F SYST BP LT 130 MM HG: CPT | Performed by: FAMILY MEDICINE

## 2025-01-18 ASSESSMENT — ENCOUNTER SYMPTOMS
WEIGHT LOSS: 0
MYALGIAS: 0
NAUSEA: 0
VOMITING: 0
EYE REDNESS: 0
EYE DISCHARGE: 0

## 2025-01-18 ASSESSMENT — FIBROSIS 4 INDEX: FIB4 SCORE: 0.53

## 2025-01-18 NOTE — PROGRESS NOTES
"Subjective     Audrey Leyva is a 39 y.o. female who presents with Sinus Problem (Uri for roughly 1 month /P/t suspects sinus infection )            1 month sinus pressure and drainage. Productive cough without blood in sputum. No fever. No SOB/wheeze. No relief with OTC medication. No other aggravating or alleviating factors.          Review of Systems   Constitutional:  Negative for malaise/fatigue and weight loss.   Eyes:  Negative for discharge and redness.   Gastrointestinal:  Negative for nausea and vomiting.   Musculoskeletal:  Negative for joint pain and myalgias.   Skin:  Negative for itching and rash.              Objective     /78 (BP Location: Left arm, Patient Position: Sitting, BP Cuff Size: Adult)   Pulse 99   Temp 36.4 °C (97.5 °F) (Temporal)   Resp 14   Ht 1.676 m (5' 6\")   Wt 109 kg (240 lb 4.8 oz)   SpO2 96%   BMI 38.79 kg/m²      Physical Exam  Constitutional:       General: She is not in acute distress.     Appearance: She is well-developed.   HENT:      Head: Normocephalic and atraumatic.      Right Ear: Tympanic membrane normal.      Left Ear: Tympanic membrane normal.      Nose: Congestion present.      Mouth/Throat:      Comments: Purulent PND  Eyes:      Conjunctiva/sclera: Conjunctivae normal.   Cardiovascular:      Rate and Rhythm: Normal rate and regular rhythm.      Heart sounds: Normal heart sounds. No murmur heard.  Pulmonary:      Effort: Pulmonary effort is normal.      Breath sounds: Normal breath sounds. No wheezing.   Skin:     General: Skin is warm and dry.      Findings: No rash.   Neurological:      Mental Status: She is alert.                             Assessment & Plan            1. Acute bacterial sinusitis  amoxicillin-clavulanate (AUGMENTIN) 875-125 MG Tab    DISCONTINUED: amoxicillin-clavulanate (AUGMENTIN) 875-125 MG Tab        Nasal saline. Nasal corticosteroid.     Differential diagnosis, natural history, supportive care, and indications for " immediate follow-up were discussed.

## 2025-03-17 ENCOUNTER — HOSPITAL ENCOUNTER (OUTPATIENT)
Dept: LAB | Facility: MEDICAL CENTER | Age: 40
End: 2025-03-17
Payer: COMMERCIAL

## 2025-03-17 DIAGNOSIS — Z13.21 SCREENING FOR ENDOCRINE, NUTRITIONAL, METABOLIC AND IMMUNITY DISORDER: ICD-10-CM

## 2025-03-17 DIAGNOSIS — Z13.0 SCREENING FOR ENDOCRINE, NUTRITIONAL, METABOLIC AND IMMUNITY DISORDER: ICD-10-CM

## 2025-03-17 DIAGNOSIS — Z13.228 SCREENING FOR ENDOCRINE, NUTRITIONAL, METABOLIC AND IMMUNITY DISORDER: ICD-10-CM

## 2025-03-17 DIAGNOSIS — R76.8 ANTI-TPO ANTIBODIES PRESENT: ICD-10-CM

## 2025-03-17 DIAGNOSIS — Z11.4 SCREENING FOR HIV (HUMAN IMMUNODEFICIENCY VIRUS): ICD-10-CM

## 2025-03-17 DIAGNOSIS — Z13.29 SCREENING FOR ENDOCRINE, NUTRITIONAL, METABOLIC AND IMMUNITY DISORDER: ICD-10-CM

## 2025-03-17 DIAGNOSIS — Z13.0 SCREENING FOR IRON DEFICIENCY ANEMIA: ICD-10-CM

## 2025-03-17 LAB
ERYTHROCYTE [DISTWIDTH] IN BLOOD BY AUTOMATED COUNT: 38.9 FL (ref 35.9–50)
HCT VFR BLD AUTO: 49.4 % (ref 37–47)
HGB BLD-MCNC: 15.4 G/DL (ref 12–16)
HIV 1+2 AB+HIV1 P24 AG SERPL QL IA: NORMAL
MCH RBC QN AUTO: 27 PG (ref 27–33)
MCHC RBC AUTO-ENTMCNC: 31.2 G/DL (ref 32.2–35.5)
MCV RBC AUTO: 86.5 FL (ref 81.4–97.8)
PLATELET # BLD AUTO: 269 K/UL (ref 164–446)
PMV BLD AUTO: 10.5 FL (ref 9–12.9)
RBC # BLD AUTO: 5.71 M/UL (ref 4.2–5.4)
WBC # BLD AUTO: 8.2 K/UL (ref 4.8–10.8)

## 2025-03-17 PROCEDURE — 36415 COLL VENOUS BLD VENIPUNCTURE: CPT

## 2025-03-17 PROCEDURE — 85027 COMPLETE CBC AUTOMATED: CPT

## 2025-03-17 PROCEDURE — 80053 COMPREHEN METABOLIC PANEL: CPT

## 2025-03-17 PROCEDURE — 87389 HIV-1 AG W/HIV-1&-2 AB AG IA: CPT

## 2025-03-17 PROCEDURE — 80061 LIPID PANEL: CPT

## 2025-03-17 PROCEDURE — 84443 ASSAY THYROID STIM HORMONE: CPT

## 2025-03-18 LAB
ALBUMIN SERPL BCP-MCNC: 4.1 G/DL (ref 3.2–4.9)
ALBUMIN/GLOB SERPL: 1.1 G/DL
ALP SERPL-CCNC: 86 U/L (ref 30–99)
ALT SERPL-CCNC: 19 U/L (ref 2–50)
ANION GAP SERPL CALC-SCNC: 9 MMOL/L (ref 7–16)
AST SERPL-CCNC: 22 U/L (ref 12–45)
BILIRUB SERPL-MCNC: 0.4 MG/DL (ref 0.1–1.5)
BUN SERPL-MCNC: 17 MG/DL (ref 8–22)
CALCIUM ALBUM COR SERPL-MCNC: 9.3 MG/DL (ref 8.5–10.5)
CALCIUM SERPL-MCNC: 9.4 MG/DL (ref 8.5–10.5)
CHLORIDE SERPL-SCNC: 104 MMOL/L (ref 96–112)
CHOLEST SERPL-MCNC: 168 MG/DL (ref 100–199)
CO2 SERPL-SCNC: 25 MMOL/L (ref 20–33)
CREAT SERPL-MCNC: 0.66 MG/DL (ref 0.5–1.4)
GFR SERPLBLD CREATININE-BSD FMLA CKD-EPI: 114 ML/MIN/1.73 M 2
GLOBULIN SER CALC-MCNC: 3.9 G/DL (ref 1.9–3.5)
GLUCOSE SERPL-MCNC: 79 MG/DL (ref 65–99)
HDLC SERPL-MCNC: 53 MG/DL
LDLC SERPL CALC-MCNC: 100 MG/DL
POTASSIUM SERPL-SCNC: 4.3 MMOL/L (ref 3.6–5.5)
PROT SERPL-MCNC: 8 G/DL (ref 6–8.2)
SODIUM SERPL-SCNC: 138 MMOL/L (ref 135–145)
TRIGL SERPL-MCNC: 75 MG/DL (ref 0–149)
TSH SERPL DL<=0.005 MIU/L-ACNC: 1.23 UIU/ML (ref 0.38–5.33)

## 2025-03-19 ENCOUNTER — OFFICE VISIT (OUTPATIENT)
Dept: MEDICAL GROUP | Facility: PHYSICIAN GROUP | Age: 40
End: 2025-03-19
Payer: COMMERCIAL

## 2025-03-19 ENCOUNTER — HOSPITAL ENCOUNTER (OUTPATIENT)
Dept: LAB | Facility: MEDICAL CENTER | Age: 40
End: 2025-03-19
Payer: COMMERCIAL

## 2025-03-19 VITALS
HEIGHT: 66 IN | BODY MASS INDEX: 38.41 KG/M2 | TEMPERATURE: 98.1 F | WEIGHT: 239 LBS | HEART RATE: 105 BPM | DIASTOLIC BLOOD PRESSURE: 66 MMHG | OXYGEN SATURATION: 96 % | SYSTOLIC BLOOD PRESSURE: 114 MMHG

## 2025-03-19 DIAGNOSIS — R76.8 ANTI-TPO ANTIBODIES PRESENT: ICD-10-CM

## 2025-03-19 DIAGNOSIS — R77.1 ELEVATED SERUM GLOBULIN LEVEL: ICD-10-CM

## 2025-03-19 DIAGNOSIS — R52 BURNING PAIN: ICD-10-CM

## 2025-03-19 LAB — THYROPEROXIDASE AB SERPL-ACNC: 24.7 IU/ML (ref 0–9)

## 2025-03-19 PROCEDURE — 82784 ASSAY IGA/IGD/IGG/IGM EACH: CPT

## 2025-03-19 PROCEDURE — 3074F SYST BP LT 130 MM HG: CPT

## 2025-03-19 PROCEDURE — 36415 COLL VENOUS BLD VENIPUNCTURE: CPT

## 2025-03-19 PROCEDURE — 3078F DIAST BP <80 MM HG: CPT

## 2025-03-19 PROCEDURE — 86376 MICROSOMAL ANTIBODY EACH: CPT

## 2025-03-19 PROCEDURE — 99214 OFFICE O/P EST MOD 30 MIN: CPT

## 2025-03-19 PROCEDURE — 82785 ASSAY OF IGE: CPT

## 2025-03-19 ASSESSMENT — FIBROSIS 4 INDEX: FIB4 SCORE: 0.73

## 2025-03-19 ASSESSMENT — PATIENT HEALTH QUESTIONNAIRE - PHQ9: CLINICAL INTERPRETATION OF PHQ2 SCORE: 0

## 2025-03-21 LAB
IGA SERPL-MCNC: 374 MG/DL (ref 68–408)
IGE SERPL-ACNC: 39 KU/L
IGG SERPL-MCNC: 1454 MG/DL (ref 768–1632)
IGM SERPL-MCNC: 223 MG/DL (ref 35–263)

## 2025-03-25 NOTE — Clinical Note
REFERRAL APPROVAL NOTICE         Sent on March 25, 2025                   Audrey Ballardr  [address]  [facility] [address]                   Dear Ms. Leyva,    After a careful review of the medical information and benefit coverage, [facility] has processed your referral. See below for additional details.    If applicable, you must be actively enrolled with your insurance for coverage of the authorized service. If you have any questions regarding your coverage, please contact your insurance directly.    REFERRAL INFORMATION   Referral #:  [id number]  Referred-To Department    Referred-By Provider:  Orthopedics    SISSY Holland   [facility] (joint)      [address]  [place] [id number] [phone] [address] [facility] [address] [phone]    Referral Start Date:  03/19/2025  Referral End Date:   03/19/2026             SCHEDULING  If you do not already have an appointment, please call [phone] to make an appointment.     MORE INFORMATION  If you do not already have a [employer] account, sign up at: [email or web address]  You can access your medical information, make appointments, see lab results, billing information, and more.  If you have questions regarding this referral, please contact  the [facility] Referrals department at:             [phone]. Monday - Friday 8:00AM - 5:00PM.     Sincerely,    [facility]

## 2025-03-26 NOTE — PROGRESS NOTES
Verbal consent was acquired by the patient to use [employer] ambient listening note generation during this visit     Subjective:     HPI:   History of Present Illness  The patient is a 40-year-old female presenting for follow-up regarding paresthesia in the fingers and toes, particularly in the feet after prolonged periods. Recent laboratory results have been reviewed.    On Friday, she experienced intense heat sensation in the third and fourth toes of the right foot upon rising, followed by severe cramping and neuralgia during ambulation. The pain is intermittent, alleviated by avoiding weight-bearing activities, and more pronounced when barefoot. There are no associated color changes, numbness, or bony prominences. Capillary refill is within normal limits. The patient wears memory foam slippers without arch support and has not attempted stretching exercises. There are no similar symptoms in the left foot or weakness in either foot.    The patient continues on tirzepatide 7.5 mg with no significant weight loss. Initial weight loss plateaued, followed by gradual weight gain. The medication is obtained from [employer].    Depression is well-managed; however, anxiety has worsened. The patient is considering the addition of ashwagandha with tirzepatide but is concerned about potential hypoglycemic effects.    There is no history of celiac disease or gluten sensitivity.    MEDICATIONS  Current: tirzepatide        Assessment & Plan:     Problem List Items Addressed This Visit    None  Visit Diagnoses         Burning pain        Relevant Orders    DX-FOOT-COMPLETE 3+ RIGHT    Referral to Orthopedics      Anti-TPO antibodies present        Relevant Orders    THYROID PEROXIDASE  (TPO) AB (Completed)    IGA SERUM QUANT (Completed)    IGE SERUM (Completed)    IGG SERUM QUANT (Completed)    IGM SERUM QUANT (Completed)      Elevated serum globulin level        Relevant Orders    THYROID PEROXIDASE  (TPO) AB (Completed)    IGA  SERUM QUANT (Completed)    IGE SERUM (Completed)    IGG SERUM QUANT (Completed)    IGM SERUM QUANT (Completed)              Assessment & Plan  1. Pain in third and fourth toes of right foot: This is an acute, uncomplicated problem.    Possible bone spur or tarsal tunnel syndrome. Unlikely spinal origin.  - Advised hyperextension stretches  - Recommended supportive footwear  - Ordered x-ray  - Referred to ortho if no improvement.     2. Weight management: Chronic and ongoing. On tirzepatide 7.5 mg with initial weight loss, followed by plateau and gradual weight gain.  - Medication from online platform     3. Anxiety: Chronic and ongoing   - Discussed ashwagandha with tirzepatide, no significant evidence of hypoglycemia, advised monitoring symptoms    4. Elevated globulin levels: Chronic and ongoing   Undiagnosed concern with uncertain prognosis.    Elevated TPO antibodies suggest autoimmune response.  - Check IgG levels        Health Maintenance: Orders placed as applicable to patient     Objective:     Exam:  Objective:  Vitals:    03/19/25 1433   BP: 114/66   Pulse: (!) 105   Temp: 36.7 °C (98.1 °F)   SpO2: 96%     Physical Exam  Physical Exam  Cardiovascular:      Pulses:           Dorsalis pedis pulses are 2+ on the right side.   Musculoskeletal:      Right foot: Normal range of motion.   Feet:      Right foot:      Skin integrity: Skin integrity normal.         Constitutional:       Appearance: Normal appearance.   Eyes:      Extraocular Movements: Extraocular movements intact.   Pulmonary:      Effort: Pulmonary effort is normal.   Neurological:      General: No focal deficit present.      Mental Status: She is alert and oriented to person, place, and time.   Psychiatric:         Mood and Affect: Mood normal.         Behavior: Behavior normal.       Return if symptoms worsen or fail to improve.    Please note that this dictation was created using voice recognition software. I have made every reasonable attempt  to correct obvious errors, but I expect that there are errors of grammar and possibly content that I did not discover before finalizing the note.

## 2025-04-07 ENCOUNTER — RESULTS FOLLOW-UP (OUTPATIENT)
Dept: MEDICAL GROUP | Facility: PHYSICIAN GROUP | Age: 40
End: 2025-04-07

## 2025-05-17 ENCOUNTER — HOSPITAL ENCOUNTER (EMERGENCY)
Facility: MEDICAL CENTER | Age: 40
End: 2025-05-18
Attending: STUDENT IN AN ORGANIZED HEALTH CARE EDUCATION/TRAINING PROGRAM
Payer: COMMERCIAL

## 2025-05-17 DIAGNOSIS — R00.2 PALPITATIONS: Primary | ICD-10-CM

## 2025-05-17 PROCEDURE — 99284 EMERGENCY DEPT VISIT MOD MDM: CPT

## 2025-05-17 PROCEDURE — 93005 ELECTROCARDIOGRAM TRACING: CPT | Mod: TC

## 2025-05-17 PROCEDURE — 36415 COLL VENOUS BLD VENIPUNCTURE: CPT

## 2025-05-17 PROCEDURE — 93005 ELECTROCARDIOGRAM TRACING: CPT | Mod: TC | Performed by: STUDENT IN AN ORGANIZED HEALTH CARE EDUCATION/TRAINING PROGRAM

## 2025-05-17 ASSESSMENT — FIBROSIS 4 INDEX: FIB4 SCORE: 0.73

## 2025-05-18 ENCOUNTER — HOSPITAL ENCOUNTER (OUTPATIENT)
Dept: RADIOLOGY | Facility: MEDICAL CENTER | Age: 40
End: 2025-05-18
Payer: COMMERCIAL

## 2025-05-18 VITALS
RESPIRATION RATE: 18 BRPM | SYSTOLIC BLOOD PRESSURE: 105 MMHG | BODY MASS INDEX: 37.98 KG/M2 | DIASTOLIC BLOOD PRESSURE: 58 MMHG | TEMPERATURE: 97.1 F | WEIGHT: 236.33 LBS | HEIGHT: 66 IN | HEART RATE: 96 BPM | OXYGEN SATURATION: 95 %

## 2025-05-18 LAB
ALBUMIN SERPL BCP-MCNC: 4 G/DL (ref 3.2–4.9)
ALBUMIN/GLOB SERPL: 1.1 G/DL
ALP SERPL-CCNC: 83 U/L (ref 30–99)
ALT SERPL-CCNC: 11 U/L (ref 2–50)
ANION GAP SERPL CALC-SCNC: 9 MMOL/L (ref 7–16)
AST SERPL-CCNC: 18 U/L (ref 12–45)
BASOPHILS # BLD AUTO: 0.3 % (ref 0–1.8)
BASOPHILS # BLD: 0.03 K/UL (ref 0–0.12)
BILIRUB SERPL-MCNC: 0.3 MG/DL (ref 0.1–1.5)
BUN SERPL-MCNC: 14 MG/DL (ref 8–22)
CALCIUM ALBUM COR SERPL-MCNC: 8.9 MG/DL (ref 8.5–10.5)
CALCIUM SERPL-MCNC: 8.9 MG/DL (ref 8.5–10.5)
CHLORIDE SERPL-SCNC: 105 MMOL/L (ref 96–112)
CO2 SERPL-SCNC: 25 MMOL/L (ref 20–33)
CREAT SERPL-MCNC: 0.7 MG/DL (ref 0.5–1.4)
D DIMER PPP IA.FEU-MCNC: <0.27 UG/ML (FEU) (ref 0–0.5)
EKG IMPRESSION: NORMAL
EKG IMPRESSION: NORMAL
EOSINOPHIL # BLD AUTO: 0.16 K/UL (ref 0–0.51)
EOSINOPHIL NFR BLD: 1.5 % (ref 0–6.9)
ERYTHROCYTE [DISTWIDTH] IN BLOOD BY AUTOMATED COUNT: 37.7 FL (ref 35.9–50)
GFR SERPLBLD CREATININE-BSD FMLA CKD-EPI: 113 ML/MIN/1.73 M 2
GLOBULIN SER CALC-MCNC: 3.5 G/DL (ref 1.9–3.5)
GLUCOSE SERPL-MCNC: 96 MG/DL (ref 65–99)
HCG SERPL QL: NEGATIVE
HCT VFR BLD AUTO: 43.6 % (ref 37–47)
HGB BLD-MCNC: 14.6 G/DL (ref 12–16)
IMM GRANULOCYTES # BLD AUTO: 0.04 K/UL (ref 0–0.11)
IMM GRANULOCYTES NFR BLD AUTO: 0.4 % (ref 0–0.9)
LYMPHOCYTES # BLD AUTO: 2.25 K/UL (ref 1–4.8)
LYMPHOCYTES NFR BLD: 20.8 % (ref 22–41)
MCH RBC QN AUTO: 27.7 PG (ref 27–33)
MCHC RBC AUTO-ENTMCNC: 33.5 G/DL (ref 32.2–35.5)
MCV RBC AUTO: 82.7 FL (ref 81.4–97.8)
MONOCYTES # BLD AUTO: 0.57 K/UL (ref 0–0.85)
MONOCYTES NFR BLD AUTO: 5.3 % (ref 0–13.4)
NEUTROPHILS # BLD AUTO: 7.77 K/UL (ref 1.82–7.42)
NEUTROPHILS NFR BLD: 71.7 % (ref 44–72)
NRBC # BLD AUTO: 0 K/UL
NRBC BLD-RTO: 0 /100 WBC (ref 0–0.2)
NT-PROBNP SERPL IA-MCNC: <36 PG/ML (ref 0–125)
PLATELET # BLD AUTO: 226 K/UL (ref 164–446)
PMV BLD AUTO: 10.4 FL (ref 9–12.9)
POTASSIUM SERPL-SCNC: 3.6 MMOL/L (ref 3.6–5.5)
PROT SERPL-MCNC: 7.5 G/DL (ref 6–8.2)
RBC # BLD AUTO: 5.27 M/UL (ref 4.2–5.4)
SODIUM SERPL-SCNC: 139 MMOL/L (ref 135–145)
TROPONIN T SERPL-MCNC: <6 NG/L (ref 6–19)
TSH SERPL DL<=0.005 MIU/L-ACNC: 1.76 UIU/ML (ref 0.38–5.33)
WBC # BLD AUTO: 10.8 K/UL (ref 4.8–10.8)

## 2025-05-18 PROCEDURE — 85379 FIBRIN DEGRADATION QUANT: CPT

## 2025-05-18 PROCEDURE — 85025 COMPLETE CBC W/AUTO DIFF WBC: CPT

## 2025-05-18 PROCEDURE — 84703 CHORIONIC GONADOTROPIN ASSAY: CPT

## 2025-05-18 PROCEDURE — 84443 ASSAY THYROID STIM HORMONE: CPT

## 2025-05-18 PROCEDURE — 84484 ASSAY OF TROPONIN QUANT: CPT

## 2025-05-18 PROCEDURE — 71045 X-RAY EXAM CHEST 1 VIEW: CPT

## 2025-05-18 PROCEDURE — 93005 ELECTROCARDIOGRAM TRACING: CPT | Mod: TC | Performed by: STUDENT IN AN ORGANIZED HEALTH CARE EDUCATION/TRAINING PROGRAM

## 2025-05-18 PROCEDURE — 83880 ASSAY OF NATRIURETIC PEPTIDE: CPT

## 2025-05-18 PROCEDURE — 700105 HCHG RX REV CODE 258: Performed by: STUDENT IN AN ORGANIZED HEALTH CARE EDUCATION/TRAINING PROGRAM

## 2025-05-18 PROCEDURE — 80053 COMPREHEN METABOLIC PANEL: CPT

## 2025-05-18 RX ORDER — SODIUM CHLORIDE, SODIUM LACTATE, POTASSIUM CHLORIDE, CALCIUM CHLORIDE 600; 310; 30; 20 MG/100ML; MG/100ML; MG/100ML; MG/100ML
1000 INJECTION, SOLUTION INTRAVENOUS ONCE
Status: COMPLETED | OUTPATIENT
Start: 2025-05-18 | End: 2025-05-18

## 2025-05-18 RX ADMIN — SODIUM CHLORIDE, POTASSIUM CHLORIDE, SODIUM LACTATE AND CALCIUM CHLORIDE 1000 ML: 600; 310; 30; 20 INJECTION, SOLUTION INTRAVENOUS at 01:19

## 2025-05-18 NOTE — ED TRIAGE NOTES
"Chief Complaint   Patient presents with    Palpitations     X few hours ago patient had felt her chest \"beat hard and fast\" which lasted approximately 5-10 seconds intermittently followed by the feeling of near syncope. Denies CP, -thinner, -LOC. HX: holter monitor     Shortness of Breath     Intermittent SOB that she feels when her heart \"beats hard and fast\".     Patient wheeled to triage for above complaint. Patient A&Ox4, GCS 15, patient speaking in full sentences. Equal and unlabored respirations. Patient educated on triage process and encouraged to notify staff if condition worsens. SOB protocol ordered in lobby.     In triage, patient HR increased and decreased back to BL, see VS.    BP (!) 145/92   Pulse 99   Temp 36.2 °C (97.1 °F) (Temporal)   Resp 16   Ht 1.676 m (5' 6\")   Wt 107 kg (236 lb 5.3 oz)   LMP  (Within Weeks)   SpO2 98%   BMI 38.15 kg/m²       "

## 2025-05-18 NOTE — DISCHARGE INSTRUCTIONS
As discussed you should follow-up with your primary care provider and cardiologist.  If you have uncontrolled pain, difficulty breathing, passing out please return to the emergency room.

## 2025-05-18 NOTE — ED NOTES
Bedside report received from off going RN/tech: Nasim, assumed care of patient.  POC discussed with patient. Call light within reach, all needs addressed at this time.       Fall risk interventions in place: Move the patient closer to the nurse's station, Patient's personal possessions are with in their safe reach, Place socks on patient, Place fall risk sign on patient's door, Give patient urinal if applicable, Keep floor surfaces clean and dry, and Accompanied to restroom (all applicable per [place] Fall risk assessment)   Continuous monitoring: Cardiac Leads, Pulse Ox, or Blood Pressure  IVF/IV medications: Not Applicable   Oxygen: Room Air  Bedside sitter: Not Applicable   Isolation: Not Applicable

## 2025-05-18 NOTE — ED PROVIDER NOTES
"ER Provider Note    Scribed for Dr. Bakari Toscano, DO by Susy Weeks. 5/18/2025  12:01 AM    Primary Care Provider: SISSY Holland    CHIEF COMPLAINT  Chief Complaint   Patient presents with    Palpitations     X few hours ago patient had felt her chest \"beat hard and fast\" which lasted approximately 5-10 seconds intermittently followed by the feeling of near syncope. Denies CP, -thinner, -LOC. HX: holter monitor     Shortness of Breath     Intermittent SOB that she feels when her heart \"beats hard and fast\".       EXTERNAL RECORDS REVIEWED  Outpatient Notes Patient was seen on 8/6/24 by cardiology for evaluation of palpitations     HPI/ROS  LIMITATION TO HISTORY   Select: : None    OUTSIDE HISTORIAN(S):  Family Patient's family members are present at bedside.    Audrey Leyva is a 40 y.o. female who presents to the ED for evaluation of heart palpitations onset tonight while sitting down. Patient reports that she had 3 episodes of this tonight. Denies vomiting, diarrhea, fever, shortness of breath, or chest pain. She notes a history of similar symptoms, but never so many episodes in one night. Patient states that her legs swell after a shift, but this is normal for her. Denies consistent use of drugs or alcohol. Denies family  history of sudden death or cardiovascular disease before age 50. Denies current medications or history of medical problems.     PAST MEDICAL HISTORY  Past Medical History[1]    SURGICAL HISTORY  Past Surgical History[2]    FAMILY HISTORY  Family History   Problem Relation Age of Onset    Cancer Mother     Diabetes Mother     Hypertension Mother     Stroke Maternal Uncle        SOCIAL HISTORY   reports that she quit smoking about 6 years ago. Her smoking use included cigarettes. She has never used smokeless tobacco. She reports current alcohol use. She reports that she does not use drugs.    CURRENT MEDICATIONS  Previous Medications    MECLIZINE (ANTIVERT) 12.5 MG " "TAB    Take 1 Tablet by mouth 3 times a day as needed for Dizziness.    TIRZEPATIDE 2.5 MG/0.5ML SOLUTION PEN-INJECTOR    Inject  under the skin.       ALLERGIES  Patient has no known allergies.    PHYSICAL EXAM  BP (!) 140/90   Pulse 100   Temp 36.2 °C (97.1 °F) (Temporal)   Resp 18   Ht 1.676 m (5' 6\")   Wt 107 kg (236 lb 5.3 oz)   LMP  (Within Weeks)   SpO2 94%   BMI 38.15 kg/m²   Constitutional: Alert in no apparent distress.  HENT: No signs of trauma, Bilateral external ears normal, Nose normal.   Eyes: Pupils are equal and reactive, Conjunctiva normal, Non-icteric.   Neck: Normal range of motion, No tenderness, Supple, No stridor.   Cardiovascular: Regular rate and rhythm, no murmurs.   Thorax & Lungs: Normal breath sounds, No respiratory distress, No wheezing, No chest tenderness.   Abdomen: Bowel sounds normal, Soft, No tenderness, No masses, No pulsatile masses.   Skin: Warm, Dry, No erythema, No rash.   Back: No bony tenderness, No CVA tenderness.   Extremities: Intact distal pulses, No edema, No tenderness, No cyanosis  Musculoskeletal: Good range of motion in all major joints. No tenderness to palpation or major deformities noted.   Neurologic: Alert , Normal motor function, Normal sensory function, No focal deficits noted.     DIAGNOSTIC STUDIES & PROCEDURES    Labs:   Results for orders placed or performed during the hospital encounter of 25   EKG    Collection Time: 25 12:02 AM   Result Value Ref Range    Report       [facility] Emergency Dept.    Test Date:  2025  Pt Name:    ALYSSA PHILLIPS               Department: ER  MRN:        [id number]                      Room:  Gender:     Female                       Technician: [id number]  :        1985                   Requested By:ER TRIAGE PROTOCOL  Order #:    [id number]                    Reading MD: Bakari Toscano    Measurements  Intervals                                Axis  Rate:       97         "                   P:          54  TN:         139                          QRS:        104  QRSD:       103                          T:          31  QT:         372  QTc:        473    Interpretive Statements  Interpreted by me: Sinus rhythm, rate 97, slight prolongation of QRS  otherwise normal intervals, mild downsloping ST segments in lead I stable  from previous otherwise no signs of acute ischemia, no signs of Brugada, HCM,  prolonged QTc, ARVD  Electronically Signed On 05- 00:02:55 PDT by Bakari Toscano      CBC with Differential    Collection Time: 05/18/25 12:05 AM   Result Value Ref Range    WBC 10.8 4.8 - 10.8 K/uL    RBC 5.27 4.20 - 5.40 M/uL    Hemoglobin 14.6 12.0 - 16.0 g/dL    Hematocrit 43.6 37.0 - 47.0 %    MCV 82.7 81.4 - 97.8 fL    MCH 27.7 27.0 - 33.0 pg    MCHC 33.5 32.2 - 35.5 g/dL    RDW 37.7 35.9 - 50.0 fL    Platelet Count 226 164 - 446 K/uL    MPV 10.4 9.0 - 12.9 fL    Neutrophils-Polys 71.70 44.00 - 72.00 %    Lymphocytes 20.80 (L) 22.00 - 41.00 %    Monocytes 5.30 0.00 - 13.40 %    Eosinophils 1.50 0.00 - 6.90 %    Basophils 0.30 0.00 - 1.80 %    Immature Granulocytes 0.40 0.00 - 0.90 %    Nucleated RBC 0.00 0.00 - 0.20 /100 WBC    Neutrophils (Absolute) 7.77 (H) 1.82 - 7.42 K/uL    Lymphs (Absolute) 2.25 1.00 - 4.80 K/uL    Monos (Absolute) 0.57 0.00 - 0.85 K/uL    Eos (Absolute) 0.16 0.00 - 0.51 K/uL    Baso (Absolute) 0.03 0.00 - 0.12 K/uL    Immature Granulocytes (abs) 0.04 0.00 - 0.11 K/uL    NRBC (Absolute) 0.00 K/uL   Comp Metabolic Panel    Collection Time: 05/18/25 12:05 AM   Result Value Ref Range    Sodium 139 135 - 145 mmol/L    Potassium 3.6 3.6 - 5.5 mmol/L    Chloride 105 96 - 112 mmol/L    Co2 25 20 - 33 mmol/L    Anion Gap 9.0 7.0 - 16.0    Glucose 96 65 - 99 mg/dL    Bun 14 8 - 22 mg/dL    Creatinine 0.70 0.50 - 1.40 mg/dL    Calcium 8.9 8.5 - 10.5 mg/dL    Correct Calcium 8.9 8.5 - 10.5 mg/dL    AST(SGOT) 18 12 - 45 U/L    ALT(SGPT) 11 2 - 50 U/L    Alkaline  Phosphatase 83 30 - 99 U/L    Total Bilirubin 0.3 0.1 - 1.5 mg/dL    Albumin 4.0 3.2 - 4.9 g/dL    Total Protein 7.5 6.0 - 8.2 g/dL    Globulin 3.5 1.9 - 3.5 g/dL    A-G Ratio 1.1 g/dL   proBrain Natriuretic Peptide, NT    Collection Time: 25 12:05 AM   Result Value Ref Range    NT-proBNP <36 0 - 125 pg/mL   Troponin    Collection Time: 25 12:05 AM   Result Value Ref Range    Troponin T <6 6 - 19 ng/L   HCG QUAL SERUM    Collection Time: 25 12:05 AM   Result Value Ref Range    Beta-Hcg Qualitative Serum Negative Negative   TSH WITH REFLEX TO FT4    Collection Time: 25 12:05 AM   Result Value Ref Range    TSH 1.760 0.380 - 5.330 uIU/mL   D-DIMER    Collection Time: 25 12:05 AM   Result Value Ref Range    D-Dimer <0.27 0.00 - 0.50 ug/mL (FEU)   ESTIMATED GFR    Collection Time: 25 12:05 AM   Result Value Ref Range    GFR (CKD-EPI) 113 >60 mL/min/1.73 m 2   EKG    Collection Time: 25  1:03 AM   Result Value Ref Range    Report       [facility] Emergency Dept.    Test Date:  2025  Pt Name:    ALYSSA PHILLIPS               Department: ER  MRN:        [id number]                      Room[facility]       [facility]  Gender:     Female                       Technician:  :        1985                   Requested By:SANJAY ALVARADO  Order #:    [id number]                    Reading MD:    Measurements  Intervals                                Axis  Rate:       141                          P:          56  AR:         115                          QRS:        115  QRSD:       96                           T:          -22  QT:         300  QTc:        460    Interpretive Statements  Sinus tachycardia  Right axis deviation  Borderline T abnormalities, inferior leads  Compared to ECG 2025 23:23:06  Right-axis deviation now present  T-wave abnormality now present  Sinus rhythm no longer present  Possible ischemia no longer present        All labs reviewed by  me.    EKG:   I have independently interpreted this EKG     Radiology:   The attending Emergency Physician has independently interpreted the diagnostic imaging associated with this visit and is awaiting the final reading from the radiologist, which will be displayed below.    Preliminary interpretation is a follows: No pneumothorax  Radiologist interpretation:    DX-CHEST-PORTABLE (1 VIEW)   Final Result      Mild left basilar atelectasis.           COURSE & MEDICAL DECISION MAKING     INITIAL ASSESSMENT AND PLAN  Care Narrative:       12:01 AM - Patient seen and evaluated at bedside.  Patient presenting after 3 episodes of palpitations and near syncope.  Patient has had recurrent palpitations for years was recently fired by cardiology and reportedly had normal Holter monitoring according to their notes.  Patient currently asymptomatic.  No signs of structural heart disease.  Patient does describe intermittent dyspnea associated with palpitations.  Will obtain blood work to assess for anemia, metabolic derangements,  cardiac biomarkers, thyroid studies and D-dimer given patient's presenting tachycardia.    1:05 AM - Patient was reevaluated at bedside.  During episode of symptomatic palpitations patient was noted to be tachycardic to the 140s.  Twelve-lead EKG was obtained during this time and per my interpretation shows sinus tachycardia no signs of atrial fibrillation, a flutter, SVT or ventricular tachycardia.  Blood work has been unremarkable including negative pregnancy, D-dimer, TSH, troponin, BNP and CBC and metabolic panel.  Chest x-ray without signs of pulmonary edema or cardiomegaly.  Will order fluids and PO challenge.    2:27 AM - Patient was reevaluated at bedside.  Patient said resolved tachycardia.  Discussed at length with patient plan for close primary care follow-up and return precautions.  Discussed the plan for discharge, patient is agreeable to the plan.    HYDRATION: Based on the patient's  presentation of Tachycardia the patient was given IV fluids. IV Hydration was used because oral hydration was not adequate alone. Upon recheck following hydration, the patient was improved.                 DISPOSITION AND DISCUSSIONS  I have discussed management of the patient with the following physicians and RENATA's: None      Escalation of care considered, and ultimately not performed: acute inpatient care management, however at this time, the patient is most appropriate for outpatient management.  Consider admission for further cardiac workup but patient has had recent negative Holter monitoring no signs of structural heart disease and symptomatically improved with resolved tachycardia.    Barriers to care at this time, including but not limited to: None.          The patient will return for new or worsening symptoms and is stable at the time of discharge.    The patient is referred to a primary physician for blood pressure management, diabetic screening, and for all other preventative health concerns.    DISPOSITION:  Patient will be discharged home in stable condition.    FOLLOW UP:  ADAMS HollandRRameshN.  [address] [facility] [id number] [phone]    Schedule an appointment as soon as possible for a visit       [facility], Emergency Dept  [address] [place] [id number]  [phone]  Go to   If symptoms worsen      OUTPATIENT MEDICATIONS:  Discharge Medication List as of 5/18/2025  2:47 AM           FINAL IMPRESSION   1. Palpitations         Susy FREED (Nubia), tracy scribing for, and in the presence of, Bakari Tsocano DO.    Electronically signed by: Susy Lora), 5/18/2025    IBakari DO personally performed the services described in this documentation, as scribed by Susy Weeks in my presence, and it is both accurate and complete.    The note accurately reflects work and decisions made by me.  Bakari FREED  Jarad Toscano D.O.  5/18/2025  6:58 AM          [1]   Past Medical History:  Diagnosis Date    Depression     Palpitations     pt wore halter monitor, no findings   [2]   Past Surgical History:  Procedure Laterality Date    CHOLECYSTECTOMY

## 2025-05-18 NOTE — ED NOTES
Pt called RN to report onset of palpitations at 0039. Pts cardiac monitor showed possible SVT with rate of 144 bpm. Pt denies any SOB, CP, lightheadedness or dizziness at this time. This RN stepped out to grab EKG machine. Pt self converted to sinus tachycardia rate of 116 bpm upon Rns return to bedside at 0042. ERP Jarad Toscano updated. Rhythm strip printed from nurses station cardiac monitor

## 2025-05-18 NOTE — ED NOTES
Pt called again to report onset of palpitations. Pt continuing to deny any CP, SOB, dizziness or lightheadedness at this time. Rate 141-155 bpm. ERP called to bedside. EKG done.

## 2025-05-30 NOTE — PROGRESS NOTES
Chief Complaint   Patient presents with    Palpitations          Subjective:   Audrey Leyva is a 40 y.o. female who presents today for follow-up.     Patient of Dr. Oliveros.  Current medical problems include palpitations and hypotension. Their last clinic visit was 8/8/2024 with Dr. Oliveros.    Patient presented to the ED on 5/18/2025 with palpitations, near syncope and shortness of breath. She was found to be tachcyardic 140s in sinus tachycardia without any arrhythmias noted. The tachycardia resolved and she was given IV hydration.     Today's visit:  Patient presents today for follow-up after being in the ER.  She reports since her emergency room visit she has not had recurrence of palpitations.  She does have history of palpitations and occurred she would have short runs maybe every few months.  On the day she went to the emergency room she was sitting down at night and felt her heart racing she just laid down in bed and relaxed and after 2 minutes the palpitations were resolved, but after 30 minutes they came back and this happened 4 more times during these episodes she felt near syncopal, short of breath and felt her heart racing.  That is when she decided to go to the emergency room.  She did have episodes in the emergency room which they were able to catch on EKG which showed sinus tachycardia.  She was given IV hydration and tachycardia had resolved without any other intervention.  Patient does report struggling with hydration as well as sleep.  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  She has no family history of premature coronary artery disease or arrhythmias.  She has no concerning cardiac history.  She denies chest pain, orthopnea, PND, edema, or syncope.     Cardiovascular Risk Factors:  1. Smoking status: Former smoker  2. Type II Diabetes Mellitus: No   Lab Results   Component Value Date/Time    HBA1C 5.1 11/11/2024 10:43 AM    HBA1C 6.0 (H) 05/05/2023 07:20  "AM     3. Hypertension: No  4. Dyslipidemia: No   Cholesterol,Tot   Date Value Ref Range Status   03/17/2025 168 100 - 199 mg/dL Final     LDL   Date Value Ref Range Status   03/17/2025 100 (H) <100 mg/dL Final     HDL   Date Value Ref Range Status   03/17/2025 53 >=40 mg/dL Final     Triglycerides   Date Value Ref Range Status   03/17/2025 75 0 - 149 mg/dL Final     5. Family history of early Coronary Artery Disease in a first degree relative (Male less than 55 years of age; Female less than 65 years of age): No    Past Medical History[1]      Family History   Problem Relation Age of Onset    Cancer Mother     Diabetes Mother     Hypertension Mother     Stroke Maternal Uncle          Social History[2]      Allergies[3]      Current Outpatient Medications   Medication Sig    metoprolol tartrate (LOPRESSOR) 25 MG Tab Take 1 Tablet by mouth one time as needed (as needed for palpitation) for up to 1 dose.    meclizine (ANTIVERT) 12.5 MG Tab Take 1 Tablet by mouth 3 times a day as needed for Dizziness.    Tirzepatide 2.5 MG/0.5ML Solution Pen-injector Inject  under the skin.         Review of Systems   Constitutional: Negative for malaise/fatigue.   Cardiovascular:  Positive for near-syncope and palpitations. Negative for chest pain, dyspnea on exertion, leg swelling, orthopnea, paroxysmal nocturnal dyspnea and syncope.   Respiratory:  Positive for shortness of breath.    Neurological:  Negative for dizziness, headaches and light-headedness.           Objective:   /64 (BP Location: Left arm, Patient Position: Sitting, BP Cuff Size: Adult long)   Pulse 91   Resp 16   Ht 1.676 m (5' 6\")   Wt 107 kg (236 lb)   SpO2 98%  Body mass index is 38.09 kg/m².         Physical Exam  Vitals reviewed.   Constitutional:       General: She is not in acute distress.     Appearance: Normal appearance.   HENT:      Head: Normocephalic and atraumatic.   Cardiovascular:      Rate and Rhythm: Normal rate and regular rhythm.      " Pulses: Normal pulses.      Heart sounds: No murmur heard.  Pulmonary:      Effort: Pulmonary effort is normal. No respiratory distress.      Breath sounds: Normal breath sounds.   Musculoskeletal:      Right lower leg: No edema.      Left lower leg: No edema.   Neurological:      Mental Status: She is alert and oriented to person, place, and time.      Gait: Gait normal.   Psychiatric:         Behavior: Behavior normal.             Lab Results   Component Value Date/Time    SODIUM 139 05/18/2025 12:05 AM    POTASSIUM 3.6 05/18/2025 12:05 AM    CHLORIDE 105 05/18/2025 12:05 AM    CO2 25 05/18/2025 12:05 AM    GLUCOSE 96 05/18/2025 12:05 AM    BUN 14 05/18/2025 12:05 AM    CREATININE 0.70 05/18/2025 12:05 AM      Lab Results   Component Value Date/Time    WBC 10.8 05/18/2025 12:05 AM    RBC 5.27 05/18/2025 12:05 AM    HEMOGLOBIN 14.6 05/18/2025 12:05 AM    HEMATOCRIT 43.6 05/18/2025 12:05 AM    MCV 82.7 05/18/2025 12:05 AM    MCH 27.7 05/18/2025 12:05 AM    MCHC 33.5 05/18/2025 12:05 AM    MPV 10.4 05/18/2025 12:05 AM    NEUTSPOLYS 71.70 05/18/2025 12:05 AM    LYMPHOCYTES 20.80 (L) 05/18/2025 12:05 AM    MONOCYTES 5.30 05/18/2025 12:05 AM    EOSINOPHILS 1.50 05/18/2025 12:05 AM    BASOPHILS 0.30 05/18/2025 12:05 AM      Lab Results   Component Value Date/Time    CHOLSTRLTOT 168 03/17/2025 11:05 AM     (H) 03/17/2025 11:05 AM    HDL 53 03/17/2025 11:05 AM    TRIGLYCERIDE 75 03/17/2025 11:05 AM       Lab Results   Component Value Date/Time    TROPONINT <6 05/18/2025 0005     Lab Results   Component Value Date/Time    NTPROBNP <36 05/18/2025 0005     Assessment:   1. Palpitations  - metoprolol tartrate (LOPRESSOR) 25 MG Tab; Take 1 Tablet by mouth one time as needed (as needed for palpitation) for up to 1 dose.  Dispense: 30 Tablet; Refill: 11  - EC-ECHOCARDIOGRAM COMPLETE W/O CONT; Future  - Cardiac Event Monitor; Future    2. Tachycardia        Medical Decision Making:  Today's Assessment / Plan:    Palpitations  Tachycardia  -prior to last episode patient had occasional palpitations every few months. No episodes since discharging from ED.   -echocardiogram ordered to evaluate for any structural abnormalities  -cardiac event monitor ordered to evaluate for any electrical abnormalities  -start metoprolol as needed for symptomatic palpitations 25 mg   -patient reports she has started focusing more on hydration and adding electrolytes to her water. She works night shift so sleep is also a factor but is trying to find a day shift position      No follow-ups on file.  Sooner if problems.    SISSY Garrett          [1]   Past Medical History:  Diagnosis Date    Depression     Palpitations     pt wore halter monitor, no findings   [2]   Social History  Tobacco Use    Smoking status: Former     Current packs/day: 0.00     Types: Cigarettes     Quit date: 2018     Years since quittin.4    Smokeless tobacco: Never   Vaping Use    Vaping status: Never Used   Substance Use Topics    Alcohol use: Not Currently     Comment: soc    Drug use: No   [3] No Known Allergies

## 2025-06-02 ENCOUNTER — OFFICE VISIT (OUTPATIENT)
Dept: CARDIOLOGY | Facility: MEDICAL CENTER | Age: 40
End: 2025-06-02
Payer: COMMERCIAL

## 2025-06-02 VITALS
HEART RATE: 91 BPM | OXYGEN SATURATION: 98 % | DIASTOLIC BLOOD PRESSURE: 64 MMHG | SYSTOLIC BLOOD PRESSURE: 110 MMHG | BODY MASS INDEX: 37.93 KG/M2 | RESPIRATION RATE: 16 BRPM | HEIGHT: 66 IN | WEIGHT: 236 LBS

## 2025-06-02 DIAGNOSIS — R00.2 PALPITATIONS: Primary | ICD-10-CM

## 2025-06-02 DIAGNOSIS — R00.0 TACHYCARDIA: ICD-10-CM

## 2025-06-02 PROCEDURE — 99214 OFFICE O/P EST MOD 30 MIN: CPT

## 2025-06-02 PROCEDURE — 3078F DIAST BP <80 MM HG: CPT

## 2025-06-02 PROCEDURE — 3074F SYST BP LT 130 MM HG: CPT

## 2025-06-02 RX ORDER — METOPROLOL TARTRATE 25 MG/1
25 TABLET, FILM COATED ORAL
Qty: 30 TABLET | Refills: 11 | Status: SHIPPED | OUTPATIENT
Start: 2025-06-02

## 2025-06-02 ASSESSMENT — ENCOUNTER SYMPTOMS
DIZZINESS: 0
PND: 0
SYNCOPE: 0
LIGHT-HEADEDNESS: 0
DYSPNEA ON EXERTION: 0
NEAR-SYNCOPE: 1
ORTHOPNEA: 0
PALPITATIONS: 1
HEADACHES: 0
SHORTNESS OF BREATH: 1

## 2025-06-02 ASSESSMENT — FIBROSIS 4 INDEX: FIB4 SCORE: 0.94

## 2025-06-05 ENCOUNTER — TELEPHONE (OUTPATIENT)
Dept: HEALTH INFORMATION MANAGEMENT | Facility: OTHER | Age: 40
End: 2025-06-05
Payer: COMMERCIAL

## 2025-06-17 ENCOUNTER — APPOINTMENT (OUTPATIENT)
Dept: MEDICAL GROUP | Facility: PHYSICIAN GROUP | Age: 40
End: 2025-06-17
Payer: COMMERCIAL

## 2025-06-23 ENCOUNTER — ANCILLARY PROCEDURE (OUTPATIENT)
Facility: MEDICAL CENTER | Age: 40
End: 2025-06-23
Attending: INTERNAL MEDICINE
Payer: COMMERCIAL

## 2025-06-23 DIAGNOSIS — R00.2 PALPITATIONS: ICD-10-CM

## 2025-06-23 PROCEDURE — 93306 TTE W/DOPPLER COMPLETE: CPT

## 2025-06-26 NOTE — OP THERAPY DAILY TREATMENT
Outpatient Physical Therapy  DAILY TREATMENT     [facility] Physical Therapy [place]  [address], [address]  [facility] [address]  Phone:  [phone]  Fax:  [phone]    Date: 06/01/2022    Patient: Audrey Leyva  YOB: 1985  MRN: [id number]     Time Calculation    Start time: 0300  Stop time: 0345 Time Calculation (min): 45 minutes         Chief Complaint: L shoulder  Visit #: 2    SUBJECTIVE:  Reports compliance with HEP. Pain is about the same but not worse.     OBJECTIVE:  Current objective measures: flexion 160 with pain, ER 60 degrees with pain in 90 degrees abduction          Therapeutic Exercises (CPT 02511):     1. Stick ER, stick flexion, 1min each    2. Rows, x 20 blk band progressed to purple @ home    3. Shoulder extension, blk band x 20    4. Bilateral shoulder ER , flasher orange x 20    5. Trx rows, x 20    6. Standing scaption, 1# x20    7. Standing wall ball flexion, 1min    8. Wall ball pushups, x 20    9. Standing ER stretch, 20 sec x 3      Therapeutic Exercise Summary: HEP instruction/performance and development. Handout provided and exercises located below:  Access Code: [email or web address]  URL: [email or web address]  Date: 06/01/2022  Prepared by: Carroll Miller    Exercises        Shoulder External Rotation and Scapular Retraction with Resistance - 1 x daily - 2 sets - 15-20 reps      Single Arm Doorway Pec Stretch at 90 Degrees Abduction (Mirrored) - 2-3 x daily - 10 reps      Scaption with Dumbbells - 1 x daily - 2 sets - 10-12 reps      Standing Bilateral Low Shoulder Row with Anchored Resistance - 1 x daily - 2 sets - 20 reps      Time-based treatments/modalities:    Physical Therapy Timed Treatment Charges  Therapeutic exercise minutes (CPT 99435): 45 minutes      Pain rating (1-10) before treatment:  2  Pain rating (1-10) after treatment:  2    ASSESSMENT:   Response to treatment: suspect anterior deltoid vs biceps strain and resultant tendonitis and stiffness.  Addendum (1767) - final impression of u/s done this AM available. No change to plan.     1. No acute DVT.   2. Chronic nonocclusive DVT in one of the paired left popliteal veins and    in the left gastrocnemius vein.   3. Benign 1.8 cm left popliteal cyst.    VASCULAR MEDICINE CLINIC - INITIAL VISIT (ANTICOAGULATION)  7/2/25    Referred for length of therapy (LOT) determination of anticoagulation in context of acute venothromboembolic disease      Subjective    HPI:  Overall, doing well.  No problems w/ bleeding.  Leg swelling and pain has improved greatly.  Still with leg pain that comes and goes throughout the day but overall better.  She felt a tremendous improvement while on Eliquis for 1 week. Now back on Xarelto but prefers to take Eliquis.  No problems with copay.  Partial hypercoag w/u obtained in ER. She was neg for FVL and PTGM. Functional protein c/s and ATIII obtained at that time and was WNL.   Needs colonoscopy for anemia. Last colonoscopy was 5 years ago. Follows with [facility].  Stands sometimes for long periods at work but able to sit and rest her legs as needed.  Overall doing wel.  Has completed 3 mo of therapy.    VTE disease / Anticoagulation:   Date of initiation of anticoagulation: 4/4/25  Current symptoms:  Denies current SOB, CP, leg swelling, edema, leg pain, cyanosis of digits, redness of extremities.  Current antithrombotic agent: Xarelto 20 mg daily  Complications: none, tolerating well, no bleeding or bruising   Adherence: reports complete, no missed doses      _____Pertinent VTE pmhx:   Date of Diagnosis: 4/4/25  Type of Venous thromboembolic disease (VTE): RLE non occlusive acute/subacute DVT to the superficial femoral junction extending into the common femoral vein at the junction; LLE occlusive acute/subacute DVT to the popliteal and gastrocnemius vein.  Preceding/presenting symptoms: Seen in ER on 4/4/25 with HTN and worsening LLE swelling and pain for several days . Found to have jennifer  DVT.  Antithrombotic therapy at time of VTE event: no  VTE tx course: Xarelto 15 mg BID x 21 days then 20 mg daily. Transitioned for ~1 week to Eliquis 5 mg BID due to losing some of her Xarelto tablets and not being able to get a refill due to trying to  too soon. [facility] pharmacies only had Eliquis samples (no Xarelto samples).   Any personal previous VTE hx? No  Any family VTE hx? Yes, Details: Sister had a provoked DVT    _____UNPROVOKED VS PROVOKED:   Recent surgery ? No but had EGD 2/10/25  Recent trauma ? No  Smoker?  reports that she quit smoking about 18 months ago. Her smoking use included cigarettes. She has never used smokeless tobacco.    Extended travel? No  Recent hospitalization: Yes, Details: She was admitted 2/9-2/11/25 for abd pain. Underwent EGD and found to have dilation at the GJ junction. Also with YADIRA and MARILU. Pt was to f/u with GI for repeat colonoscopy.  Other periods of immobility?   Other known or potential risk factors for VTE disease:  no    WOMEN: Hx of cancer or abnormal cancer screenings: no       Any estrogen, testosterone HRT, E2 birth control, tamoxifene, raloxifene: no       Hx of recurrent miscarriages: will access next visit  Hypercoaguability work-up completed?  yes - partial w/u obtained in ER. She was neg for FVL and PTGM. Functional protein c/s and ATIII obtained at that time and was WNL. (see assessment for details)     Problem List[1]   Past Surgical History[2]   Family History   Problem Relation Age of Onset    Cancer Father         ? type of cancer    Diabetes Father     Hypertension Father     Other Father         Migraine    Cancer Maternal Aunt         breast    Heart Disease Maternal Grandmother     Dementia Maternal Grandmother     Heart Disease Maternal Grandfather     Alcohol/Drug Paternal Grandfather       Social History[3]    DIET AND EXERCISE:  Weight Change: stable  Diet: common adult  Exercise: sporadic irregular exercise     Medications Ordered Prior  HEP progressed and f/u's scheduled in one week. Instructed to contact office sooner if symptoms worsen.     PLAN/RECOMMENDATIONS:   Plan for treatment: therapy treatment to continue next visit.  Planned interventions for next visit: continue with current treatment.        to Encounter[4]    Allergies:  Neosporin [neomycin-bacitracin-polymyxin], Sudafed [pseudoephedrine hcl], Cleocin [clindamycin hcl], and Tape      Review of Systems   HENT:  Negative for nosebleeds.    Respiratory:  Negative for hemoptysis and shortness of breath.    Cardiovascular:  Negative for chest pain, claudication and leg swelling.   Gastrointestinal:  Negative for blood in stool and melena.   Genitourinary:  Negative for hematuria.   Musculoskeletal:  Positive for myalgias. Negative for falls and joint pain.   Endo/Heme/Allergies:  Does not bruise/bleed easily.            Objective       Objective:     Vitals:    07/02/25 1007   BP: (!) 147/88   Pulse: 71        Physical Exam  Constitutional:       Appearance: Normal appearance.   Cardiovascular:      Rate and Rhythm: Normal rate and regular rhythm.      Heart sounds: Normal heart sounds.      Comments: Palpable varicose veins noted to right inner knee region. Scattered varicosities noted to jennifer LEs.  Pulmonary:      Effort: Pulmonary effort is normal.      Breath sounds: Normal breath sounds.   Musculoskeletal:         General: No swelling or tenderness.      Right lower leg: No edema.      Left lower leg: No edema.      Comments: Left calf circumference 40 cm, right calf circumference 40 cm.   Skin:     General: Skin is warm and dry.      Comments: Mild stasis dermatitis/pigmentation to her jennifer LEs, above ankle level, L>R. No ulcers or lesions noted.   Neurological:      Mental Status: She is alert and oriented to person, place, and time.   Psychiatric:         Mood and Affect: Mood normal.         Behavior: Behavior normal.         Thought Content: Thought content normal.         Judgment: Judgment normal.          DATA REVIEW    Lab Results   Component Value Date/Time    CHOLSTRLTOT 243 (H) 03/13/2024 07:07 AM     (H) 03/13/2024 07:07 AM     03/13/2024 07:07 AM    TRIGLYCERIDE 125 03/13/2024 07:07 AM       Lab Results   Component Value  "Date/Time    SODIUM 141 2025 03:38 PM    POTASSIUM 4.0 2025 03:38 PM    CHLORIDE 106 2025 03:38 PM    CO2 25 2025 03:38 PM    GLUCOSE 104 (H) 2025 03:38 PM    BUN 8 2025 03:38 PM    CREATININE 0.62 2025 03:38 PM    CREATININE 0.93 2011 08:15 AM    BUNCREATRAT 22 2014 07:50 AM    BUNCREATRAT 14 2011 08:15 AM    GLOMRATE >59 2010 08:10 AM     Lab Results   Component Value Date/Time    ALKPHOSPHAT 206 (H) 2025 03:38 PM    ASTSGOT 25 2025 03:38 PM    ALTSGPT 10 2025 03:38 PM    TBILIRUBIN 0.8 2025 03:38 PM       INR   Date Value Ref Range Status   2012 0.95 0.87 - 1.13 Final     Comment:     INR - Non-therapeutic Reference Range: 0.87-1.13  INR - Therapeutic Reference Range: 2.0-4.0     No results found for: \"POCINR\"       Hypercoaguability work-up completed?  YES  Hypercoag w/u (OK to check WHILE ON ANTICOAG)  Elevated D-dimer?  not tested  Factor V leiden?  negative  Factor II DNA analysis (prothrombin gene mutation)? negative  Beta 2-glycoprotein-I aB IgG, IgM?  not tested  Anticardiolipin antibodies?  not tested  Hypercoag w/u (OK to check 2 WEEKS AFTER STOPPING ANTICOAG)  Protein C functional deficiency?  negative - but obtained at time of acute DVT  Protein S functional deficiency? negative - but obtained at time of acute DVT  Antithrombin III functional deficiency?  negative - but obtained at time of acute DVT  Lupus anticoagulant?  not tested      Pertinent Cardiovascular Imagin/4/25 Huan venous duplex:   Right lower extremity:   Non-occlusive, acute to subacute thrombus visualized in the superficial    femoral junction extending into the common femoral vein only at the    junction.   All other veins demonstrate complete color filling and compressibility with    normal venous flow dynamics including spontaneous flow and respiratory    phasicity.       Left lower extremity:   Occlusive, acute to subacute thrombus " visualized throughout the popliteal    vein into the gastrocnemius veins into the mid calf.    All other veins demonstrate complete color filling and compressibility with    normal venous flow dynamics including spontaneous flow and respiratory    phasicity.    5/13/25 LLE venous duplex:  No evidence of left lower extremity deep venous thrombosis to the level of the popliteal vein.  The calf veins are not delineated. Uncertain whether the result of occlusive thrombus or technical difficulty due to marked soft tissue edema within the calf.    7/2/25 Huan LE venous duplex:  Final result pending at the time of documentation     FINDINGS:   RIGHT LEG:   All veins demonstrate complete color filling and compressibility with    normal venous flow dynamics including spontaneous flow and respiratory    phasicity.    No deep venous thrombosis.    LEFT LEG:   Partial chronic thrombus in one of two paired popliteal veins and a    gastrocnemius vein.   Non-vascular, anechoic cyst-like area inferior to the popliteal artery &    vein measuring 1.8 x 0.9 cm.   All other veins demonstrate complete color filling and compressibility with    normal venous flow dynamics including spontaneous flow and respiratory    phasicity.       Medical Decision Making:  Today's Assessment / Status / Plan:     1. Acute deep vein thrombosis (DVT) of proximal vein of both lower extremities (HCC)  apixaban (ELIQUIS) 5 MG Tab    cyclobenzaprine (FLEXERIL) 5 MG tablet    CBC WITHOUT DIFFERENTIAL    Comp Metabolic Panel           1) VTE disease: RLE non occlusive acute/subacute DVT to the superficial femoral junction extending into the common femoral vein at the junction now resolved; LLE occlusive acute/subacute DVT to the popliteal and gastrocnemius vein, now chronic, partially occlusive.  Provoked? possibly provoked by hospitalization for medical illness 1-2 months prior   Thrombophilia/hypercoag evaluation:  partial w/u completed and neg  PLAN  - no  imaging surveillance needed at this time  - antithrombotic plan as noted below   - counseled on signs and symptoms of acute VTE that require seeking prompt attention in the ED including shortness of breath, chest pain, pain with deep inhalation, acute leg swelling and/or pain in calf or leg   - elevate legs as much as possible, use compression stockings/socks if directed by your provider  - avoid hormonal therapies containing E2 or testosterone, raloxifene, tamoxifene, and other meds increasing risk for VTE   - avoid or limit sedentary periods  - continue complete avoidance of tobacco products  - if having any invasive procedure,please make sure the doctor knows of your history of blood clots and current anticoagulation status  - recommend f/u with PCP for age-appropriate cancer-screening due to risk of malig-associated VTE    ANTITHROMBOTIC THERAPY  Date of initiation: 4/4/25  HAS-BLED bleeding risk calc ([email or web address]): 0 pts, 0.9%, low risk   Factors to consider for indefinite OAC:   Last CBC, BMP: reviewed above   Expected duration: reviewed standard of care as per Chest 2021 guidelines is 3-6 months minimum on full dose OAC.  She has completed 3 months of therapy. Fortunately, she has tolerated OAC without any problems with bleeding. Her f/u u/s is pending but appears to show a partially occlusive chronic DVT to her LLE popliteal/gastroc vein and resolution of the DVT to her RLE. Explained to patient the implications of a chronic DVT. Given initial bilateral DVTs, I recommend 6 months of therapy which she is agreeable. She wishes to take Eliquis instead o Xarelto as she felt better overall and noticed less leg pain/swelling on Eliquis. We can consider repeating some of her hypercoag w/u labs after her 6 mo of therapy as we would normally not obtain during an acute DVT which they were drawn in the ER. We can discuss this further at her next visit. After review of risks/benefits/alternatives, through shared  decision-making, we will stop Xarelto and began taking Eliquis 5 mg by mouth twice daily. Stressed the importance of monitoring for any prolonged or abnormal bleeding and to make sure all of her providers know she takes Eliquis. Recommend she schedule f/u with her GI provider. Now that she has completed 3 mo of therapy, she can proceed with colonoscopy if needed. Would have her hold her anticoagulant two days prior and resume the next day. We discussed avoiding prolonged sitting or standing, elevating her legs, and ambulating often. I recommend jennifer knee high compression stockings (20-30 mmHg) to be work during the daytime or while working. She is requesting a one time refill of her cyclobenzaprine which I will refill as a one time courtesy. Pt verbalizes understanding and is in agreement with this plan. I will review final impression of u/s from this AM once posted.  PLAN:  - stop Xarelto and began taking Eliquis 5 mg by mouth twice daily  - stressed strict adherence to tx and avoid early termination due to increased risk for recurrent VTE  - Q6mo CBC, BMP (monitor CrCl) while on OAC   - avoid Aspirin and NSAIDs while anticoagulated   - limit or avoid sports or high-risk for head injury to reduce risk for intracranial bleeds  - advised to seek urgent eval for any GI bleeding, stroke-like sx, or minor bleeding >30min duration   - advised reversal agents are available for all agents     LIFESTYLE INTERVENTIONS  TOBACCO:   reports that she quit smoking about 18 months ago. Her smoking use included cigarettes. She has never used smokeless tobacco.   - continued complete avoidance of all tobacco products   PHYSICAL ACTIVITY: >150min/week of mod-intensity activity or as much as tolerated  ETOH: complete abstinence recommended while taking an anticoagulant  WT MGMT: maintain healthy weight (reduction 1kg = reduction 1mmHg BP)     Other: Popliteal cyst - u/s shows non-vascular, anechoic cyst-like area inferior to the  popliteal artery & vein measuring 1.8 x 0.9 cm. Would recommend monitoring for now.    Studies to Be Obtained: none  Labs to Be Obtained: cbc, cmp prior to next visit.    Follow up in: 3 months    Destiny Dior MICHELLE           [1]   Patient Active Problem List  Diagnosis    Low back pain    Obesity    Anxiety    Depression    Arthritis    Hiatus hernia syndrome    Peripheral neuropathy    Diverticulosis    Intractable migraine without aura    GERD with esophagitis    Vitamin D deficiency    DDD (degenerative disc disease), lumbar    Primary hypertension    Hyperlipidemia LDL goal <100    Skin infection    Age-related osteoporosis without current pathological fracture    Acute renal failure (HCC)    Gastric ulcer    Anemia    Status post gastric bypass for obesity    Deep vein thrombosis (HCC)   [2]   Past Surgical History:  Procedure Laterality Date    [place] UPPER GI ENDOSCOPY,DIAGNOSIS N/A 2/10/2025    Procedure: GASTROSCOPY;  Surgeon: Arnol Finney M.D.;  Location: SURGERY SAME DAY [facility];  Service: Gastroenterology    [place] UPPER GI ENDOSCOPY,W/DILAT,GASTRIC OUT N/A 2/10/2025    Procedure: GASTROSCOPY, WITH BALLOON DILATION;  Surgeon: Arnol Finney M.D.;  Location: SURGERY SAME DAY [facility];  Service: Gastroenterology    INJ,SACROILIAC,ANES/STEROID  10/18/2013    Performed by Sonny Archuleta D.O. at [facility]    INJ,SACROILIAC,ANES/STEROID  8/16/2013    Performed by Sonny Archuleta D.O. at [facility] ORS    INJ,SACROILIAC,ANES/STEROID  8/16/2013    Performed by Sonny Archuleta D.O. at [facility] ORS    NEURO DEST FACET L/S W/IG SNGL  4/19/2013    Performed by Sonny Archuleta D.O. at [facility] ORS    NEURO DEST FACET L/S W/IG ADDL  4/19/2013    Performed by Sonny Archuleta D.O. at [facility] ORS    NEURO DEST FACET L/S W/IG ADDL  4/19/2013    Performed by Sonny Archuleta D.O. at [facility] ORS    NEURO DEST FACET L/S W/IG SNGL   3/29/2013    Performed by Sonny Jones D.O. at [facility] ORS    NEURO DEST FACET L/S W/IG ADDL  3/29/2013    Performed by Sonny Jones D.O. at [facility] ORS    NEURO DEST FACET L/S W/IG ADDL  3/29/2013    Performed by Sonny Jones D.O. at [facility] ORS    NEURO DEST FACET L/S W/IG ADDL  3/29/2013    Performed by Sonny Jones D.O. at [facility] ORS    INJ,SACROILIAC,ANES/STEROID  12/28/2012    Performed by Sonny Jones D.O. at [facility]    INJ,SACROILIAC,ANES/STEROID  11/9/2012    Performed by Sonny Jones D.O. at [facility] ORS    THORACIC LAMINECTOMY  9/18/2012    Performed by King Leal M.D. at [facility] ORS    SPINAL CORD STIMULATOR  9/18/2012    Performed by King Leal M.D. at [facility] ORS    INJ,SACROILIAC,ANES/STEROID  7/27/2012    Performed by SONNY JONES at [facility] ORS    INJ,SACROILIAC,ANES/STEROID  7/27/2012    Performed by SONNY JONES at [facility]    NE PERCUT IMPLNT NEUROELECT,EPIDURAL  6/22/2012    Performed by SONNY JONES at [facility]    NE PERCUT IMPLNT NEUROELECT,EPIDURAL  6/22/2012    Performed by SONNY JONES at [facility] ORS    FUSION, SPINE, LUMBAR, PLIF  7/26/2011    Performed by DANIAL KABA at SURGERY [facility] ORS    LUMBAR DECOMPRESSION  7/26/2011    Performed by DANIAL KABA at [facility] ORS    HARDWARE REMOVAL ORTHO  7/26/2011    Performed by DANIAL KABA at [facility] ORS    S I JOINT FUSION  11/11/2010    Performed by DANIAL KABA at [facility] ORS    GASTRIC BYPASS LAPAROSCOPIC  4/5/2010    Performed by GANSER, JOHN H at [facility] ORS    HIATAL HERNIA REPAIR  4/5/2010    Performed by GANSER, JOHN H at SURGERY [facility] ORS    FUSION, SPINE, LUMBAR, PLIF  11/4/2009    Performed by DANIAL KABA at  SURGERY [facility] ORS    LUMBAR DECOMPRESSION  2009    Performed by DANIAL KABA at SURGERY [facility] ORS    VA REMOVAL OF OVARY/TUBE(S)      left    SIGMOID COLECTOMY      OTHER      lasik surgery    TONSILLECTOMY      CHOLECYSTECTOMY      GASTRIC BYPASS LAPAROSCOPIC      x2    VA BREAST REDUCTION     [3]   Social History  Tobacco Use    Smoking status: Former     Current packs/day: 0.00     Types: Cigarettes     Quit date: 2024     Years since quittin.5    Smokeless tobacco: Never    Tobacco comments:     .  Quit smoking 2022   Vaping Use    Vaping status: Never Used   Substance Use Topics    Alcohol use: Yes     Alcohol/week: 0.5 oz     Types: 1 Standard drinks or equivalent per week     Comment: ocass    Drug use: No   [4]   Current Outpatient Medications on File Prior to Visit   Medication Sig Dispense Refill    losartan (COZAAR) 50 MG Tab Take 1 Tablet by mouth every day. 100 Tablet 3    amLODIPine (NORVASC) 5 MG Tab Take 1 Tablet by mouth every day for 90 days. 90 Tablet 0    ferrous sulfate 325 (65 Fe) MG tablet Take 1 Tablet by mouth every day. 30 Tablet 0     No current facility-administered medications on file prior to visit.

## 2025-07-01 LAB
LV EJECT FRACT  99904: 65
LV EJECT FRACT MOD 2C 99903: 70.06
LV EJECT FRACT MOD 4C 99902: 61.35
LV EJECT FRACT MOD BP 99901: 64.49

## 2025-07-01 PROCEDURE — 93306 TTE W/DOPPLER COMPLETE: CPT | Mod: 26 | Performed by: INTERNAL MEDICINE

## 2025-07-03 ENCOUNTER — RESULTS FOLLOW-UP (OUTPATIENT)
Dept: CARDIOLOGY | Facility: MEDICAL CENTER | Age: 40
End: 2025-07-03
Payer: COMMERCIAL

## 2025-10-07 ENCOUNTER — APPOINTMENT (OUTPATIENT)
Dept: MEDICAL GROUP | Facility: PHYSICIAN GROUP | Age: 40
End: 2025-10-07
Payer: COMMERCIAL

## 2025-10-22 ENCOUNTER — APPOINTMENT (OUTPATIENT)
Dept: MEDICAL GROUP | Facility: PHYSICIAN GROUP | Age: 40
End: 2025-10-22
Payer: COMMERCIAL